# Patient Record
Sex: FEMALE | Race: WHITE | Employment: UNEMPLOYED | URBAN - METROPOLITAN AREA
[De-identification: names, ages, dates, MRNs, and addresses within clinical notes are randomized per-mention and may not be internally consistent; named-entity substitution may affect disease eponyms.]

---

## 2017-03-17 ENCOUNTER — OFFICE VISIT (OUTPATIENT)
Dept: INTERNAL MEDICINE CLINIC | Age: 57
End: 2017-03-17

## 2017-03-17 VITALS
SYSTOLIC BLOOD PRESSURE: 100 MMHG | BODY MASS INDEX: 27.96 KG/M2 | DIASTOLIC BLOOD PRESSURE: 66 MMHG | OXYGEN SATURATION: 97 % | TEMPERATURE: 98.3 F | WEIGHT: 157.8 LBS | RESPIRATION RATE: 17 BRPM | HEART RATE: 77 BPM | HEIGHT: 63 IN

## 2017-03-17 DIAGNOSIS — K21.9 GASTROESOPHAGEAL REFLUX DISEASE WITHOUT ESOPHAGITIS: ICD-10-CM

## 2017-03-17 DIAGNOSIS — L30.9 ECZEMA, UNSPECIFIED TYPE: ICD-10-CM

## 2017-03-17 DIAGNOSIS — E03.4 HYPOTHYROIDISM DUE TO ACQUIRED ATROPHY OF THYROID: ICD-10-CM

## 2017-03-17 DIAGNOSIS — E78.00 HYPERCHOLESTEROLEMIA: ICD-10-CM

## 2017-03-17 DIAGNOSIS — R73.02 IGT (IMPAIRED GLUCOSE TOLERANCE): ICD-10-CM

## 2017-03-17 DIAGNOSIS — E55.9 VITAMIN D DEFICIENCY: ICD-10-CM

## 2017-03-17 DIAGNOSIS — R21 RASH: Primary | ICD-10-CM

## 2017-03-17 RX ORDER — TRIAMCINOLONE ACETONIDE 1 MG/G
CREAM TOPICAL 2 TIMES DAILY
Qty: 30 G | Refills: 1 | Status: SHIPPED | OUTPATIENT
Start: 2017-03-17 | End: 2022-10-20

## 2017-03-17 RX ORDER — LEVOTHYROXINE SODIUM 50 UG/1
50 TABLET ORAL
Qty: 30 TAB | Refills: 5 | Status: SHIPPED | OUTPATIENT
Start: 2017-03-17 | End: 2017-09-21 | Stop reason: SDUPTHER

## 2017-03-17 RX ORDER — CHLORPHENIRAMINE MALEATE 4 MG
TABLET ORAL 2 TIMES DAILY
Qty: 45 G | Refills: 1 | Status: SHIPPED | OUTPATIENT
Start: 2017-03-17 | End: 2022-10-20

## 2017-03-17 NOTE — PROGRESS NOTES
Room 13    Patient presents with supervisor from residence,    Chief Complaint   Patient presents with    Rash     Noticed about 1 week on left arm, this area looks different than area located behind her right knee. Itching the first day only per patient. Rash on upper thigh into groin area has been then over 1 mo., supervisor at residence feels this may be chaffing. 1. Have you been to the ER, urgent care clinic since your last visit? Hospitalized since your last visit? No    2. Have you seen or consulted any other health care providers outside of the [address] [place] [address] since your last visit? Include any pap smears or colon screening. No     Health Maintenance Due   Topic Date Due    Pneumococcal 19-64 Medium Risk (1 of 1 - PPSV23) 03/03/1981    PAP AKA CERVICAL CYTOLOGY  03/03/1983    BREAST CANCER SCRN MAMMOGRAM  10/13/2012   GYN- She states her  usually takes her for pap, unsure when last appointment.

## 2017-03-17 NOTE — PROGRESS NOTES
HISTORY OF PRESENT ILLNESS  Woodrow Gomez is a 56 y.o. female. HPI  Presents for f/u rash    Rash on left arm, flexoral area  +itch    Right posterior leg rash - similar appearance    Past medical, Social, and Family history reviewed  Medications reviewed and updated. ROS  Complete ROS reviewed and negative or stable except as noted in HPI. Physical Exam   Constitutional: She is oriented to person, place, and time. She appears well-nourished. No distress. HENT:   Head: Normocephalic and atraumatic. Eyes: EOM are normal. Pupils are equal, round, and reactive to light. No scleral icterus. Neck: Normal range of motion. Neck supple. Cardiovascular: Normal rate, regular rhythm and normal heart sounds. Exam reveals no gallop and no friction rub. No murmur heard. Pulmonary/Chest: Effort normal and breath sounds normal. No respiratory distress. She has no wheezes. She has no rales. Abdominal: Soft. She exhibits no distension. There is no tenderness. Musculoskeletal: Normal range of motion. She exhibits no edema. Neurological: She is alert and oriented to person, place, and time. She exhibits normal muscle tone. Skin: Skin is warm. Rash: left flexoral arm with somewhat annular appearing minimally raised rash with mild scale. Right posterior leg rash - slightly raised, mild scale   Psychiatric:   Baseline awkward affect   Nursing note and vitals reviewed. Prior labs reviewed. ASSESSMENT and PLAN  Favor eczema  Possible fungal component    ICD-10-CM ICD-9-CM    1. Rash R21 782.1 triamcinolone acetonide (KENALOG) 0.1 % topical cream      clotrimazole (LOTRIMIN) 1 % topical cream   2. IGT (impaired glucose tolerance) R73.02 790.22    3. Vitamin D deficiency E55.9 268.9    4. Hypercholesterolemia E78.00 272.0    5. Hypothyroidism due to acquired atrophy of thyroid E03.4 244.8      246.8    6. Gastroesophageal reflux disease without esophagitis K21.9 530.81    7.  Eczema, unspecified type L30.9 692.9      Follow-up Disposition:  Return in about 3 months (around 6/13/2017) for pre-diabetes, cholesterol.   results and schedule of future studies reviewed with patient  reviewed diet, exercise and weight   cardiovascular risk and specific lipid/LDL goals reviewed  reviewed medications and side effects in detail   Topical steroid  Lotrimin in case of fungal component

## 2017-03-17 NOTE — MR AVS SNAPSHOT
Visit Information Date & Time Provider Department Dept. Phone Encounter #  
 3/17/2017  2:30 PM Francis Barajas MD [address] [facility]  [id number] Follow-up Instructions Return in about 3 months (around 6/13/2017) for pre-diabetes, cholesterol. Your Appointments 6/13/2017 11:30 AM  
ROUTINE CARE with Francis Barajas MD  
[facility] Appt Note: cholesterol, thyroid, pre-diabetes [address] [facility] [address]  
[address] [facility] [address] Upcoming Health Maintenance Date Due Pneumococcal 19-64 Medium Risk (1 of 1 - PPSV23) 3/3/1981 PAP AKA CERVICAL CYTOLOGY 3/3/1983 BREAST CANCER SCRN MAMMOGRAM 10/13/2012 COLONOSCOPY 6/27/2019 DTaP/Tdap/Td series (2 - Td) 11/18/2025 Allergies as of 3/17/2017  Review Complete On: 3/17/2017 By: Francis Barajas MD  
  
 Severity Noted Reaction Type Reactions Bee Sting [Sting, Bee]  04/24/2013    Unknown (comments) Bees [Hymenoptera Allergenic Extract]  10/07/2015    Other (comments) Ragweed  04/24/2013    Unknown (comments) Current Immunizations  Reviewed on 12/13/2016 Name Date  
 TB Skin Test (PPD) Intradermal 4/26/2016 11:00 AM  
 Tdap 11/18/2015 Not reviewed this visit You Were Diagnosed With   
  
 Codes Comments Rash    -  Primary ICD-10-CM: R21 
ICD-9-CM: 782.1 IGT (impaired glucose tolerance)     ICD-10-CM: R73.02 
ICD-9-CM: 790.22 Vitamin D deficiency     ICD-10-CM: E55.9 ICD-9-CM: 268.9 Hypercholesterolemia     ICD-10-CM: E78.00 ICD-9-CM: 272.0 Hypothyroidism due to acquired atrophy of thyroid     ICD-10-CM: E03.4 ICD-9-CM: 244.8, 246.8 Gastroesophageal reflux disease without esophagitis     ICD-10-CM: K21.9 ICD-9-CM: 530.81 Vitals BP Pulse Temp Resp Height(growth percentile) Weight(growth percentile) 100/66 (BP 1 Location: Right arm, BP Patient Position: Sitting) 77 98.3 °F (36.8 °C) (Oral) 17 5' 3\" (1.6 m) 157 lb 12.8 oz (71.6 kg) SpO2 BMI OB Status Smoking Status 97% 27.95 kg/m2 Menopause Current Every Day Smoker Vitals History BMI and BSA Data Body Mass Index Body Surface Area  
 27.95 kg/m 2 1.78 m 2 Preferred Pharmacy Pharmacy Name Phone [facility] [phone] [phone] Your Updated Medication List  
  
   
This list is accurate as of: 3/17/17  3:51 PM.  Always use your most recent med list.  
  
  
  
  
 benztropine 1 mg tablet Commonly known as:  COGENTIN Take  by mouth nightly. cholecalciferol 1,000 unit tablet Commonly known as:  VITAMIN D3 Take 2 Tabs by mouth daily. clotrimazole 1 % topical cream  
Commonly known as:  Lacey Abdi Apply  to affected area two (2) times a day. X 7-10 days DEPAKOTE  mg ER tablet Generic drug:  divalproex ER Take 500 mg by mouth two (2) times a day. fenofibrate 160 mg tablet Commonly known as:  LOFIBRA Take 1 Tab by mouth daily. haloperidol decanoate 100 mg/mL injection Commonly known as:  HALDOL DECANOATE  
200 mg by IntraMUSCular route every twenty-one (21) days. ibuprofen 400 mg tablet Commonly known as:  MOTRIN Take 1 Tab by mouth every eight (8) hours as needed for Pain.  
  
 lamoTRIgine 25 mg tablet Commonly known as: LaMICtal  
Take  by mouth daily. levothyroxine 50 mcg tablet Commonly known as:  SYNTHROID Take 1 Tab by mouth Daily (before breakfast). naproxen sodium 220 mg tablet Commonly known as:  NAPROSYN Take 220 mg by mouth two (2) times daily as needed. pantoprazole 40 mg tablet Commonly known as:  PROTONIX Take 1 Tab by mouth daily. PARoxetine 20 mg tablet Commonly known as:  PAXIL Take  by mouth daily. PERDIEM OVERNIGHT RELIEF 15 mg tablet Generic drug:  sennosides Take  by mouth.  
  
 permethrin 5 % topical cream  
Commonly known as:  ACTICIN Apply  to affected area now. apply sparingly as directed  
  
 pravastatin 80 mg tablet Commonly known as:  PRAVACHOL Take 1 Tab by mouth nightly. SEROquel  mg XR tablet Generic drug:  QUEtiapine SR Take 200 mg by mouth nightly. triamcinolone acetonide 0.1 % topical cream  
Commonly known as:  KENALOG Apply  to affected area two (2) times a day. use thin layer as directed x 3-5 days prn  
  
  
  
  
Prescriptions Sent to Pharmacy Refills  
 triamcinolone acetonide (KENALOG) 0.1 % topical cream 1 Sig: Apply  to affected area two (2) times a day. use thin layer as directed x 3-5 days prn Class: Normal  
 Pharmacy: [address] [place] [address] Ph #: [phone] Route: Topical  
 clotrimazole (LOTRIMIN) 1 % topical cream 1 Sig: Apply  to affected area two (2) times a day. X 7-10 days Class: Normal  
 Pharmacy: [address] [place] [address] Ph #: [phone] Route: Topical  
  
Follow-up Instructions Return in about 3 months (around 6/13/2017) for pre-diabetes, cholesterol. Introducing hospitals & HEALTH SERVICES! Rafal Blackburn introduces [employer] patient portal. Now you can access parts of your medical record, email your doctor's office, and request medication refills online. 1. In your internet browser, go to [email or web address] 2. Click on the First Time User? Click Here link in the Sign In box. You will see the New Member Sign Up page. 3. Enter your [employer]ik[employer] Access Code exactly as it appears below. You will not need to use this code after youve completed the sign-up process. If you do not sign up before the expiration date, you must request a new code. · Law[employer] Access Code: [email or web address] Expires: 6/15/2017  3:50 PM 
 
4. Enter the last four digits of your Social Security Number (xxxx) and Date of Birth (mm/dd/yyyy) as indicated and click Submit. You will be taken to the next sign-up page. 5. Create a Lawdingo ID. This will be your [employer]o login ID and cannot be changed, so think of one that is secure and easy to remember. 6. Create a Law[employer]o password. You can change your password at any time. 7. Enter your Password Reset Question and Answer. This can be used at a later time if you forget your password. 8. Enter your e-mail address. You will receive e-mail notification when new information is available in [address] 9. Click Sign Up. You can now view and download portions of your medical record. 10. Click the Download Summary menu link to download a portable copy of your medical information. If you have questions, please visit the Frequently Asked Questions section of the [employer] website. Remember, [employer] is NOT to be used for urgent needs. For medical emergencies, dial 911. Now available from your iPhone and Android! Please provide this summary of care documentation to your next provider. Your primary care clinician is listed as [address] If you have any questions after today's visit, please call [phone].

## 2017-04-12 ENCOUNTER — OFFICE VISIT (OUTPATIENT)
Dept: INTERNAL MEDICINE CLINIC | Age: 57
End: 2017-04-12

## 2017-04-12 VITALS
HEART RATE: 92 BPM | RESPIRATION RATE: 20 BRPM | OXYGEN SATURATION: 96 % | TEMPERATURE: 97.6 F | SYSTOLIC BLOOD PRESSURE: 124 MMHG | DIASTOLIC BLOOD PRESSURE: 81 MMHG | HEIGHT: 63 IN | BODY MASS INDEX: 27.85 KG/M2 | WEIGHT: 157.2 LBS

## 2017-04-12 DIAGNOSIS — S20.01XA CONTUSION OF RIGHT BREAST, INITIAL ENCOUNTER: ICD-10-CM

## 2017-04-12 DIAGNOSIS — S30.1XXA CONTUSION OF ABDOMINAL WALL, INITIAL ENCOUNTER: ICD-10-CM
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
## 2017-04-18 ENCOUNTER — OFFICE VISIT (OUTPATIENT)
Dept: INTERNAL MEDICINE CLINIC | Age: 57
End: 2017-04-18

## 2017-04-18 VITALS
SYSTOLIC BLOOD PRESSURE: 117 MMHG | HEIGHT: 63 IN | WEIGHT: 157.8 LBS | OXYGEN SATURATION: 94 % | HEART RATE: 94 BPM | TEMPERATURE: 97.6 F | BODY MASS INDEX: 27.96 KG/M2 | DIASTOLIC BLOOD PRESSURE: 76 MMHG | RESPIRATION RATE: 18 BRPM
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
## 2017-04-18 DIAGNOSIS — Z00.00 PHYSICAL EXAM, ANNUAL: Primary | ICD-10-CM

## 2017-04-18 DIAGNOSIS — F25.9 SCHIZOPHRENIA, SCHIZO-AFFECTIVE (HCC): ICD-10-CM

## 2017-04-18 DIAGNOSIS — S20.01XD CONTUSION OF RIGHT BREAST, SUBSEQUENT ENCOUNTER: ICD-10-CM

## 2017-04-18 DIAGNOSIS — Z11.1 SCREENING-PULMONARY TB: ICD-10-CM

## 2017-04-18 DIAGNOSIS — R82.90 ABNORMAL URINE FINDINGS: ICD-10-CM

## 2017-04-18 DIAGNOSIS — S30.1XXD CONTUSION OF ABDOMINAL WALL, SUBSEQUENT ENCOUNTER: ICD-10-CM

## 2017-04-18 LAB
BILIRUB UR QL STRIP: NEGATIVE
GLUCOSE UR-MCNC: NEGATIVE MG/DL
KETONES P FAST UR STRIP-MCNC: NEGATIVE MG/DL
PH UR STRIP: 7.5 [PH] (ref 4.6–8)
PROT UR QL STRIP: NEGATIVE MG/DL
SP GR UR STRIP: 1.01 (ref 1–1.03)
UA UROBILINOGEN AMB POC: NORMAL (ref 0.2–1)
URINALYSIS CLARITY POC: CLEAR
URINALYSIS COLOR POC: YELLOW
URINE BLOOD POC: NORMAL
URINE LEUKOCYTES POC: NORMAL
URINE NITRITES POC: NEGATIVE
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
## 2017-04-18 NOTE — MR AVS SNAPSHOT
Visit Information Date & Time Provider Department Dept. Phone Encounter #  
 4/18/2017 10:30 AM Reza Black NP [facility] Pediatrics and Internal Medicine [phone] [id number] Follow-up Instructions Return in about 1 year (around 4/18/2018) for physical.  
  
Your Appointments 6/13/2017 11:30 AM  
ROUTINE CARE with Mariecl Weaver MD  
[facility] Pediatrics and Internal Medicine [address] [facility]) Appt Note: cholesterol, thyroid, pre-diabetes [address] [facility] [address] [facility] [address]  
[place] [address] [facility] [address] Upcoming Health Maintenance Date Due Pneumococcal 19-64 Medium Risk (1 of 1 - PPSV23) 3/3/1981 PAP AKA CERVICAL CYTOLOGY 3/3/1983 BREAST CANCER SCRN MAMMOGRAM 10/13/2012 COLONOSCOPY 6/27/2019 DTaP/Tdap/Td series (2 - Td) 11/18/2025 Allergies as of 4/18/2017  Review Complete On: 4/18/2017 By: Reza Black NP Severity Noted Reaction Type Reactions Bee Sting [Sting, Bee]  04/24/2013    Unknown (comments) Bees [Hymenoptera Allergenic Extract]  10/07/2015    Other (comments) Ragweed  04/24/2013    Unknown (comments) Current Immunizations  Reviewed on 12/13/2016 Name Date  
 TB Skin Test (PPD) Intradermal  Incomplete, 4/26/2016 11:00 AM  
 Tdap 11/18/2015 Not reviewed this visit You Were Diagnosed With   
  
 Codes Comments Physical exam, annual    -  Primary ICD-10-CM: Z00.00 ICD-9-CM: V70.0 Screening-pulmonary TB     ICD-10-CM: Z11.1 ICD-9-CM: V74.1 Vitals BP Pulse Temp Resp Height(growth percentile) Weight(growth percentile) 117/76 (BP 1 Location: Left arm, BP Patient Position: Sitting) 94 97.6 °F (36.4 °C) (Oral) 18 5' 2.99\" (1.6 m) 157 lb 12.8 oz (71.6 kg) SpO2 BMI OB Status Smoking Status 94% 27.96 kg/m2 Menopause Current Every Day Smoker BMI and BSA Data Body Mass Index Body Surface Area 27.96 kg/m 2 1.78 m 2 Preferred Pharmacy Pharmacy Name Phone [facility], [place] [address] [phone] Your Updated Medication List  
  
   
This list is accurate as of: 4/18/17 11:12 AM.  Always use your most recent med list.  
  
  
  
  
 benztropine 1 mg tablet Commonly known as:  COGENTIN Take  by mouth nightly. cholecalciferol 1,000 unit tablet Commonly known as:  VITAMIN D3 Take 2 Tabs by mouth daily. clotrimazole 1 % topical cream  
Commonly known as:  Rhetta Plate Apply  to affected area two (2) times a day. X 7-10 days DEPAKOTE  mg ER tablet Generic drug:  divalproex ER Take 500 mg by mouth two (2) times a day. fenofibrate 160 mg tablet Commonly known as:  LOFIBRA Take 1 Tab by mouth daily. haloperidol decanoate 100 mg/mL injection Commonly known as:  HALDOL DECANOATE  
200 mg by IntraMUSCular route every twenty-one (21) days. ibuprofen 400 mg tablet Commonly known as:  MOTRIN Take 1 Tab by mouth every eight (8) hours as needed for Pain.  
  
 lamoTRIgine 25 mg tablet Commonly known as: LaMICtal  
Take  by mouth daily. levothyroxine 50 mcg tablet Commonly known as:  SYNTHROID Take 1 Tab by mouth Daily (before breakfast). naproxen sodium 220 mg tablet Commonly known as:  NAPROSYN Take 220 mg by mouth two (2) times daily as needed. pantoprazole 40 mg tablet Commonly known as:  PROTONIX Take 1 Tab by mouth daily. PARoxetine 20 mg tablet Commonly known as:  PAXIL Take  by mouth daily. PERDIEM OVERNIGHT RELIEF 15 mg tablet Generic drug:  sennosides Take  by mouth.  
  
 permethrin 5 % topical cream  
Commonly known as:  ACTICIN Apply  to affected area now. apply sparingly as directed  
  
 pravastatin 80 mg tablet Commonly known as:  PRAVACHOL Take 1 Tab by mouth nightly. SEROquel  mg XR tablet Generic drug:  QUEtiapine SR Take 200 mg by mouth nightly. triamcinolone acetonide 0.1 % topical cream  
Commonly known as:  KENALOG Apply  to affected area two (2) times a day. use thin layer as directed x 3-5 days prn We Performed the Following AMB POC TUBERCULOSIS, INTRADERMAL (SKIN TEST) [26547 CPT(R)] AMB POC URINALYSIS DIP STICK AUTO W/O MICRO [67365 CPT(R)] Follow-up Instructions Return in about 1 year (around 4/18/2018) for physical.  
  
  
Introducing [facility]! Riley Murphy introduces [employer] patient portal. Now you can access parts of your medical record, email your doctor's office, and request medication refills online. 1. In your internet browser, go to [email or web address]"[email or web address]. [employer]/"[employer] 2. Click on the First Time User? Click Here link in the Sign In box. You will see the New Member Sign Up page. 3. Enter your Keepstream Access Code exactly as it appears below. You will not need to use this code after youve completed the sign-up process. If you do not sign up before the expiration date, you must request a new code. · Keep[employer] Access Code: [email or web address] Expires: 6/15/2017  3:50 PM 
 
4. Enter the last four digits of your Social Security Number (xxxx) and Date of Birth (mm/dd/yyyy) as indicated and click Submit. You will be taken to the next sign-up page. 5. Create a Keepstream ID. This will be your Keepstream login ID and cannot be changed, so think of one that is secure and easy to remember. 6. Create a Keep[employer] password. You can change your password at any time. 7. Enter your Password Reset Question and Answer. This can be used at a later time if you forget your password. 8. Enter your e-mail address. You will receive e-mail notification when new information is available in [address] 9. Click Sign Up. You can now view and download portions of your medical record.  
10. Click the Download Summary menu link to download a portable copy of your medical information. If you have questions, please visit the Frequently Asked Questions section of the [email or web address] website. Remember, [email or web address] is NOT to be used for urgent needs. For medical emergencies, dial 911. Now available from your iPhone and Android! Please provide this summary of care documentation to your next provider. Your primary care clinician is listed as [address] If you have any questions after today's visit, please call [phone].

## 2017-04-20 LAB
BACTERIA UR CULT: NORMAL
MM INDURATION POC: 0 MM (ref 0–5)
PPD POC: NEGATIVE NEGATIVE

## 2017-04-25 PROBLEM — S20.01XA CONTUSION OF RIGHT BREAST: Status: ACTIVE | Noted: 2017-04-25

## 2017-04-25 PROBLEM — S30.1XXA CONTUSION OF ABDOMINAL WALL: Status: ACTIVE | Noted: 2017-04-25

## 2017-05-19 DIAGNOSIS — E78.00 HYPERCHOLESTEROLEMIA: ICD-10-CM

## 2017-05-22 RX ORDER — PRAVASTATIN SODIUM 80 MG/1
80 TABLET ORAL
Qty: 30 TAB | Refills: 5 | Status: SHIPPED | OUTPATIENT
Start: 2017-05-22 | End: 2017-11-22 | Stop reason: SDUPTHER

## 2017-05-26 DIAGNOSIS — E55.9 VITAMIN D DEFICIENCY: ICD-10-CM

## 2017-05-26 RX ORDER — MELATONIN
2000 DAILY
Qty: 60 TAB | Refills: 11 | Status: SHIPPED | OUTPATIENT
Start: 2017-05-26 | End: 2018-05-16 | Stop reason: SDUPTHER

## 2017-06-13 ENCOUNTER — OFFICE VISIT (OUTPATIENT)
Dept: INTERNAL MEDICINE CLINIC | Age: 57
End: 2017-06-13

## 2017-06-13 VITALS
BODY MASS INDEX: 27.54 KG/M2 | DIASTOLIC BLOOD PRESSURE: 73 MMHG | RESPIRATION RATE: 16 BRPM | SYSTOLIC BLOOD PRESSURE: 107 MMHG | WEIGHT: 155.4 LBS | OXYGEN SATURATION: 95 % | HEART RATE: 81 BPM | HEIGHT: 63 IN | TEMPERATURE: 97.4 F

## 2017-06-13 DIAGNOSIS — E03.4 HYPOTHYROIDISM DUE TO ACQUIRED ATROPHY OF THYROID: ICD-10-CM

## 2017-06-13 DIAGNOSIS — E78.00 HYPERCHOLESTEROLEMIA: ICD-10-CM

## 2017-06-13 DIAGNOSIS — R73.02 IGT (IMPAIRED GLUCOSE TOLERANCE): Primary | ICD-10-CM

## 2017-06-13 DIAGNOSIS — F25.9 SCHIZOPHRENIA, SCHIZO-AFFECTIVE (HCC): ICD-10-CM

## 2017-06-13 DIAGNOSIS — E55.9 VITAMIN D DEFICIENCY: ICD-10-CM

## 2017-06-13 DIAGNOSIS — K21.9 GASTROESOPHAGEAL REFLUX DISEASE WITHOUT ESOPHAGITIS: ICD-10-CM

## 2017-06-13 LAB — HBA1C MFR BLD HPLC: 5.6 % (ref 4.8–5.6)

## 2017-06-13 RX ORDER — FENOFIBRATE 160 MG/1
160 TABLET ORAL DAILY
Qty: 30 TAB | Refills: 5 | Status: SHIPPED | OUTPATIENT
Start: 2017-06-13 | End: 2018-01-18 | Stop reason: SDUPTHER

## 2017-06-13 NOTE — MR AVS SNAPSHOT
Visit Information Date & Time Provider Department Dept. Phone Encounter #  
 6/13/2017 11:30 AM Grecia Mariscal, [address] [place] [address] and Internal Medicine [phone] [id number] Follow-up Instructions Return in about 6 months (around 12/13/2017), or if symptoms worsen or fail to improve, for thyroid, cholesterol, pre-diabetes. Upcoming Health Maintenance Date Due INFLUENZA AGE 9 TO ADULT 8/1/2017 BREAST CANCER SCRN MAMMOGRAM 5/9/2019 COLONOSCOPY 6/27/2019 PAP AKA CERVICAL CYTOLOGY 4/28/2020 DTaP/Tdap/Td series (2 - Td) 11/18/2025 Allergies as of 6/13/2017  Review Complete On: 6/13/2017 By: Grecia Mariscal MD  
  
 Severity Noted Reaction Type Reactions Bee Sting [Sting, Bee]  04/24/2013    Unknown (comments) Bees [Hymenoptera Allergenic Extract]  10/07/2015    Other (comments) Ragweed  04/24/2013    Unknown (comments) Current Immunizations  Reviewed on 12/13/2016 Name Date  
 TB Skin Test (PPD) Intradermal 4/18/2017, 4/26/2016 11:00 AM  
 Tdap 11/18/2015 Not reviewed this visit You Were Diagnosed With   
  
 Codes Comments IGT (impaired glucose tolerance)    -  Primary ICD-10-CM: R73.02 
ICD-9-CM: 790.22 Vitamin D deficiency     ICD-10-CM: E55.9 ICD-9-CM: 268.9 Hypercholesterolemia     ICD-10-CM: E78.00 ICD-9-CM: 272.0 Hypothyroidism due to acquired atrophy of thyroid     ICD-10-CM: E03.4 ICD-9-CM: 244.8, 246.8 Gastroesophageal reflux disease without esophagitis     ICD-10-CM: K21.9 ICD-9-CM: 530.81 Schizophrenia[facility]     ICD-10-CM: F25.0 ICD-9-CM: 295.70 Vitals BP Pulse Temp Resp Height(growth percentile) Weight(growth percentile) 107/73 (BP 1 Location: Right arm, BP Patient Position: Sitting) 81 97.4 °F (36.3 °C) (Oral) 16 5' 2.99\" (1.6 m) 155 lb 6.4 oz (70.5 kg) SpO2 BMI OB Status Smoking Status 95% 27.54 kg/m2 Menopause Current Every Day Smoker BMI and BSA Data Body Mass Index Body Surface Area  
 27.54 kg/m 2 1.77 m 2 Preferred Pharmacy Pharmacy Name Phone Kathy Ernandez, [place] [phone] Your Updated Medication List  
  
   
This list is accurate as of: 6/13/17  1:05 PM.  Always use your most recent med list.  
  
  
  
  
 benztropine 1 mg tablet Commonly known as:  COGENTIN Take  by mouth nightly. cholecalciferol 1,000 unit tablet Commonly known as:  VITAMIN D3 Take 2 Tabs by mouth daily. clotrimazole 1 % topical cream  
Commonly known as:  [place] Sat Apply  to affected area two (2) times a day. X 7-10 days DEPAKOTE  mg ER tablet Generic drug:  divalproex ER Take 500 mg by mouth two (2) times a day. fenofibrate 160 mg tablet Commonly known as:  LOFIBRA Take 1 Tab by mouth daily. haloperidol decanoate 100 mg/mL injection Commonly known as:  HALDOL DECANOATE  
200 mg by IntraMUSCular route every twenty-one (21) days. ibuprofen 400 mg tablet Commonly known as:  MOTRIN Take 1 Tab by mouth every eight (8) hours as needed for Pain.  
  
 lamoTRIgine 25 mg tablet Commonly known as: LaMICtal  
Take  by mouth daily. levothyroxine 50 mcg tablet Commonly known as:  SYNTHROID Take 1 Tab by mouth Daily (before breakfast). naproxen sodium 220 mg tablet Commonly known as:  NAPROSYN Take 220 mg by mouth two (2) times daily as needed. pantoprazole 40 mg tablet Commonly known as:  PROTONIX Take 1 Tab by mouth daily. PARoxetine 20 mg tablet Commonly known as:  PAXIL Take  by mouth daily. PERDIEM OVERNIGHT RELIEF 15 mg tablet Generic drug:  sennosides Take  by mouth.  
  
 permethrin 5 % topical cream  
Commonly known as:  ACTICIN Apply  to affected area now. apply sparingly as directed  
  
 pravastatin 80 mg tablet Commonly known as:  PRAVACHOL  
 Take 1 Tab by mouth nightly. SEROquel  mg XR tablet Generic drug:  QUEtiapine SR Take 200 mg by mouth nightly. triamcinolone acetonide 0.1 % topical cream  
Commonly known as:  KENALOG Apply  to affected area two (2) times a day. use thin layer as directed x 3-5 days prn  
  
  
  
  
Prescriptions Sent to Pharmacy Refills  
 fenofibrate (LOFIBRA) 160 mg tablet 5 Sig: Take 1 Tab by mouth daily. Class: Normal  
 Pharmacy: [address] [place] [address] #: [phone] Route: Oral  
  
We Performed the Following AMB POC HEMOGLOBIN A1C [41199 CPT(R)] Follow-up Instructions Return in about 6 months (around 12/13/2017), or if symptoms worsen or fail to improve, for thyroid, cholesterol, pre-diabetes. To-Do List   
 Around 06/15/2017 Lab:  CBC WITH AUTOMATED DIFF Around 06/15/2017 Lab:  CK Around 06/15/2017 Lab:  LIPID PANEL Around 06/15/2017 Lab:  METABOLIC PANEL, COMPREHENSIVE Around 06/15/2017 Lab:  T4, FREE Around 06/15/2017 Lab:  TSH 3RD GENERATION Around 06/15/2017 Lab:  VITAMIN D, 25 HYDROXY Introducing [facility]! Peg Hemp introduces [employer] patient portal. Now you can access parts of your medical record, email your doctor's office, and request medication refills online. 1. In your internet browser, go to [email or web address] 2. Click on the First Time User? Click Here link in the Sign In box. You will see the New Member Sign Up page. 3. Enter your [employer] Access Code exactly as it appears below. You will not need to use this code after youve completed the sign-up process. If you do not sign up before the expiration date, you must request a new code. · [employer] Access Code: [email or web address] Expires: 6/15/2017  3:50 PM 
 
4.  Enter the last four digits of your Social Security Number (xxxx) and Date of Birth (mm/dd/yyyy) as indicated and click Submit. You will be taken to the next sign-up page. 5. Create a [facility]Who ID. This will be your ManyWho login ID and cannot be changed, so think of one that is secure and easy to remember. 6. Create a ManyWho password. You can change your password at any time. 7. Enter your Password Reset Question and Answer. This can be used at a later time if you forget your password. 8. Enter your e-mail address. You will receive e-mail notification when new information is available in [address] 9. Click Sign Up. You can now view and download portions of your medical record. 10. Click the Download Summary menu link to download a portable copy of your medical information. If you have questions, please visit the Frequently Asked Questions section of the ManyWho website. Remember, [facility] is NOT to be used for urgent needs. For medical emergencies, dial 911. Now available from your iPhone and Android! Please provide this summary of care documentation to your next provider. Your primary care clinician is listed as [address] If you have any questions after today's visit, please call [phone].

## 2017-06-13 NOTE — PROGRESS NOTES
HISTORY OF PRESENT ILLNESS  Daniel Bautista is a 56 y.o. female. HPI  Presents for f/u IGT, thyroid, lipids    Eating better  Residence home has changed diet options  Pt expresses interest in improved diet    No med changes from psych    No new complaints    Past medical, Social, and Family history reviewed  Medications reviewed and updated. ROS  Complete ROS reviewed and negative or stable except as noted in HPI. Physical Exam   Constitutional: She is oriented to person, place, and time. She appears well-nourished. No distress. HENT:   Head: Normocephalic and atraumatic. Eyes: EOM are normal. Pupils are equal, round, and reactive to light. No scleral icterus. Neck: Normal range of motion. Neck supple. No JVD present. Cardiovascular: Normal rate, regular rhythm and normal heart sounds. Exam reveals no gallop and no friction rub. No murmur heard. Pulmonary/Chest: Effort normal and breath sounds normal. No respiratory distress. She has no wheezes. She has no rales. Abdominal: Soft. She exhibits no distension. There is no tenderness. Musculoskeletal: Normal range of motion. She exhibits no edema. Lymphadenopathy:     She has no cervical adenopathy. Neurological: She is alert and oriented to person, place, and time. She exhibits normal muscle tone. Skin: Skin is warm. No rash noted. Psychiatric:   Baseline awkward affect   Nursing note and vitals reviewed. Prior labs reviewed. POC HgBA1C 5.6 today     ASSESSMENT and PLAN    ICD-10-CM ICD-9-CM    1. IGT (impaired glucose tolerance) R73.02 790.22 AMB POC HEMOGLOBIN A1C   2. Vitamin D deficiency E55.9 268.9 VITAMIN D, 25 HYDROXY   3. Hypercholesterolemia E78.00 272.0 CK      LIPID PANEL      METABOLIC PANEL, COMPREHENSIVE      fenofibrate (LOFIBRA) 160 mg tablet   4. Hypothyroidism due to acquired atrophy of thyroid E03.4 244.8 T4, FREE     246.8 TSH 3RD GENERATION   5.  Gastroesophageal reflux disease without esophagitis K21.9 530.81 CBC WITH AUTOMATED DIFF   6. Schizophrenia, schizo-affective ([facility] F25.0 295.70      Follow-up Disposition:  Return in about 6 months (around 12/13/2017), or if symptoms worsen or fail to improve, for thyroid, cholesterol, pre-diabetes.   results and schedule of future studies reviewed with patient,   reviewed diet, exercise and weight  cardiovascular risk and specific lipid/LDL goals reviewed  reviewed medications and side effects in detail   Recheck labs

## 2017-06-13 NOTE — PROGRESS NOTES
Rm 12    Pt presents with , Fanta Shea    Chief Complaint   Patient presents with    Diabetes     f/u    Thyroid Problem     f/u    Cholesterol Problem     f/u     1. Have you been to the ER, urgent care clinic since your last visit? Hospitalized since your last visit? No    2. Have you seen or consulted any other health care providers outside of the [address] [place] [address] since your last visit? Include any pap smears or colon screening.  No    Health Maintenance Due   Topic Date Due    Pneumococcal 19-64 Medium Risk (1 of 1 - PPSV23) 03/03/1979

## 2017-07-05 ENCOUNTER — LAB ONLY (OUTPATIENT)
Dept: INTERNAL MEDICINE CLINIC | Age: 57
End: 2017-07-05

## 2017-07-05 DIAGNOSIS — E55.9 VITAMIN D DEFICIENCY: ICD-10-CM

## 2017-07-05 DIAGNOSIS — K21.9 GASTROESOPHAGEAL REFLUX DISEASE WITHOUT ESOPHAGITIS: ICD-10-CM

## 2017-07-05 DIAGNOSIS — E03.4 HYPOTHYROIDISM DUE TO ACQUIRED ATROPHY OF THYROID: ICD-10-CM

## 2017-07-05 DIAGNOSIS — E78.00 HYPERCHOLESTEROLEMIA: ICD-10-CM

## 2017-07-06 LAB
25(OH)D3+25(OH)D2 SERPL-MCNC: 26.1 NG/ML (ref 30–100)
ALBUMIN SERPL-MCNC: 4.4 G/DL (ref 3.5–5.5)
ALBUMIN/GLOB SERPL: 1.8 {RATIO} (ref 1.2–2.2)
ALP SERPL-CCNC: 67 IU/L (ref 39–117)
ALT SERPL-CCNC: 20 IU/L (ref 0–32)
AST SERPL-CCNC: 29 IU/L (ref 0–40)
BASOPHILS # BLD AUTO: 0 X10E3/UL (ref 0–0.2)
BASOPHILS NFR BLD AUTO: 0 %
BILIRUB SERPL-MCNC: 0.3 MG/DL (ref 0–1.2)
BUN SERPL-MCNC: 14 MG/DL (ref 6–24)
BUN/CREAT SERPL: 19 (ref 9–23)
CALCIUM SERPL-MCNC: 9.6 MG/DL (ref 8.7–10.2)
CHLORIDE SERPL-SCNC: 100 MMOL/L (ref 96–106)
CHOLEST SERPL-MCNC: 189 MG/DL (ref 100–199)
CK SERPL-CCNC: 75 U/L (ref 24–173)
CO2 SERPL-SCNC: 23 MMOL/L (ref 18–29)
CREAT SERPL-MCNC: 0.72 MG/DL (ref 0.57–1)
EOSINOPHIL # BLD AUTO: 0 X10E3/UL (ref 0–0.4)
EOSINOPHIL NFR BLD AUTO: 1 %
ERYTHROCYTE [DISTWIDTH] IN BLOOD BY AUTOMATED COUNT: 14.5 % (ref 12.3–15.4)
GLOBULIN SER CALC-MCNC: 2.4 G/DL (ref 1.5–4.5)
GLUCOSE SERPL-MCNC: 88 MG/DL (ref 65–99)
HCT VFR BLD AUTO: 40.7 % (ref 34–46.6)
HDLC SERPL-MCNC: 52 MG/DL
HGB BLD-MCNC: 13.1 G/DL (ref 11.1–15.9)
IMM GRANULOCYTES # BLD: 0 X10E3/UL (ref 0–0.1)
IMM GRANULOCYTES NFR BLD: 0 %
LDLC SERPL CALC-MCNC: 97 MG/DL (ref 0–99)
LYMPHOCYTES # BLD AUTO: 2.4 X10E3/UL (ref 0.7–3.1)
LYMPHOCYTES NFR BLD AUTO: 45 %
MCH RBC QN AUTO: 29.2 PG (ref 26.6–33)
MCHC RBC AUTO-ENTMCNC: 32.2 G/DL (ref 31.5–35.7)
MCV RBC AUTO: 91 FL (ref 79–97)
MONOCYTES # BLD AUTO: 0.4 X10E3/UL (ref 0.1–0.9)
MONOCYTES NFR BLD AUTO: 7 %
NEUTROPHILS # BLD AUTO: 2.5 X10E3/UL (ref 1.4–7)
NEUTROPHILS NFR BLD AUTO: 47 %
PLATELET # BLD AUTO: 291 X10E3/UL (ref 150–379)
POTASSIUM SERPL-SCNC: 5.1 MMOL/L (ref 3.5–5.2)
PROT SERPL-MCNC: 6.8 G/DL (ref 6–8.5)
RBC # BLD AUTO: 4.49 X10E6/UL (ref 3.77–5.28)
SODIUM SERPL-SCNC: 139 MMOL/L (ref 134–144)
T4 FREE SERPL-MCNC: 1.11 NG/DL (ref 0.82–1.77)
TRIGL SERPL-MCNC: 200 MG/DL (ref 0–149)
TSH SERPL DL<=0.005 MIU/L-ACNC: 2.13 UIU/ML (ref 0.45–4.5)
VLDLC SERPL CALC-MCNC: 40 MG/DL (ref 5–40)
WBC # BLD AUTO: 5.3 X10E3/UL (ref 3.4–10.8)

## 2017-12-01 ENCOUNTER — OFFICE VISIT (OUTPATIENT)
Dept: INTERNAL MEDICINE CLINIC | Age: 57
End: 2017-12-01

## 2017-12-01 VITALS
DIASTOLIC BLOOD PRESSURE: 67 MMHG | OXYGEN SATURATION: 98 % | WEIGHT: 137.4 LBS | SYSTOLIC BLOOD PRESSURE: 95 MMHG | BODY MASS INDEX: 24.34 KG/M2 | RESPIRATION RATE: 16 BRPM | TEMPERATURE: 97.8 F | HEIGHT: 63 IN | HEART RATE: 79 BPM

## 2017-12-01 DIAGNOSIS — E03.4 HYPOTHYROIDISM DUE TO ACQUIRED ATROPHY OF THYROID: ICD-10-CM

## 2017-12-01 DIAGNOSIS — R31.9 HEMATURIA, UNSPECIFIED TYPE: ICD-10-CM

## 2017-12-01 DIAGNOSIS — R41.82 ALTERED MENTAL STATUS, UNSPECIFIED ALTERED MENTAL STATUS TYPE: Primary | ICD-10-CM

## 2017-12-01 DIAGNOSIS — F25.9 SCHIZOPHRENIA, SCHIZO-AFFECTIVE (HCC): ICD-10-CM

## 2017-12-01 DIAGNOSIS — E55.9 VITAMIN D DEFICIENCY: ICD-10-CM

## 2017-12-01 DIAGNOSIS — R63.4 WEIGHT LOSS: ICD-10-CM

## 2017-12-01 DIAGNOSIS — N39.0 URINARY TRACT INFECTION WITHOUT HEMATURIA, SITE UNSPECIFIED: ICD-10-CM

## 2017-12-01 DIAGNOSIS — E53.8 B12 DEFICIENCY: ICD-10-CM

## 2017-12-01 DIAGNOSIS — J30.9 ALLERGIC RHINITIS, UNSPECIFIED CHRONICITY, UNSPECIFIED SEASONALITY, UNSPECIFIED TRIGGER: ICD-10-CM

## 2017-12-01 DIAGNOSIS — R73.02 IGT (IMPAIRED GLUCOSE TOLERANCE): ICD-10-CM

## 2017-12-01 DIAGNOSIS — E78.00 HYPERCHOLESTEROLEMIA: ICD-10-CM

## 2017-12-01 LAB
BILIRUB UR QL STRIP: NEGATIVE
GLUCOSE UR-MCNC: NEGATIVE MG/DL
KETONES P FAST UR STRIP-MCNC: NEGATIVE MG/DL
PH UR STRIP: 6.5 [PH] (ref 4.6–8)
PROT UR QL STRIP: NEGATIVE
SP GR UR STRIP: 1.01 (ref 1–1.03)
UA UROBILINOGEN AMB POC: NORMAL (ref 0.2–1)
URINALYSIS CLARITY POC: CLEAR
URINALYSIS COLOR POC: YELLOW
URINE BLOOD POC: NEGATIVE
URINE LEUKOCYTES POC: NORMAL
URINE NITRITES POC: NEGATIVE

## 2017-12-01 NOTE — MR AVS SNAPSHOT
Visit Information Date & Time Provider Department Dept. Phone Encounter #  
 12/1/2017 12:00 PM Denys Beth, [address] [place] [address] and Internal Medicine [phone] [id number] Follow-up Instructions Return in about 2 weeks (around 12/15/2017), or if symptoms worsen or fail to improve, for weight loss, etc. Upcoming Health Maintenance Date Due  
 BREAST CANCER SCRN MAMMOGRAM 5/9/2019 COLONOSCOPY 6/27/2019 PAP AKA CERVICAL CYTOLOGY 4/28/2020 DTaP/Tdap/Td series (2 - Td) 11/18/2025 Allergies as of 12/1/2017  Review Complete On: 12/1/2017 By: Denys Beth MD  
  
 Severity Noted Reaction Type Reactions Bee Sting [Sting, Bee]  04/24/2013    Unknown (comments) Bees [Hymenoptera Allergenic Extract]  10/07/2015    Other (comments) Ragweed  04/24/2013    Unknown (comments) Current Immunizations  Reviewed on 12/13/2016 Name Date Influenza Vaccine 11/20/2017 TB Skin Test (PPD) Intradermal 4/18/2017, 4/26/2016 11:00 AM  
 Tdap 11/18/2015 Not reviewed this visit You Were Diagnosed With   
  
 Codes Comments Hematuria, unspecified type    -  Primary ICD-10-CM: R31.9 ICD-9-CM: 599.70 Hypothyroidism due to acquired atrophy of thyroid     ICD-10-CM: E03.4 ICD-9-CM: 244.8, 246.8 Allergic rhinitis, unspecified chronicity, unspecified seasonality, unspecified trigger     ICD-10-CM: J30.9 ICD-9-CM: 477.9 Schizophrenia, schizo-affective ([facility]     ICD-10-CM: F25.0 ICD-9-CM: 295.70 IGT (impaired glucose tolerance)     ICD-10-CM: R73.02 
ICD-9-CM: 790.22 Hypercholesterolemia     ICD-10-CM: E78.00 ICD-9-CM: 272.0 Vitamin D deficiency     ICD-10-CM: E55.9 ICD-9-CM: 268.9 Urinary tract infection without hematuria, site unspecified     ICD-10-CM: N39.0 ICD-9-CM: 599.0 Altered mental status, unspecified altered mental status type     ICD-10-CM: R41.82 
ICD-9-CM: 780.97   
 B12 deficiency     ICD-10-CM: E53.8 ICD-9-CM: 266.2 Weight loss     ICD-10-CM: R63.4 ICD-9-CM: 783.21 Vitals BP Pulse Temp Resp Height(growth percentile) Weight(growth percentile) 95/67 (BP 1 Location: Left arm, BP Patient Position: Sitting) 79 97.8 °F (36.6 °C) (Oral) 16 5' 2.99\" (1.6 m) 137 lb 6.4 oz (62.3 kg) SpO2 BMI OB Status Smoking Status 98% 24.35 kg/m2 Menopause Current Every Day Smoker BMI and BSA Data Body Mass Index Body Surface Area  
 24.35 kg/m 2 1.66 m 2 Preferred Pharmacy Pharmacy Name Phone Kathy Gray8, [place] [phone] Your Updated Medication List  
  
   
This list is accurate as of: 12/1/17  1:09 PM.  Always use your most recent med list.  
  
  
  
  
 benztropine 1 mg tablet Commonly known as:  COGENTIN Take  by mouth nightly. cholecalciferol 1,000 unit tablet Commonly known as:  VITAMIN D3 Take 2 Tabs by mouth daily. clotrimazole 1 % topical cream  
Commonly known as:  [place] Smith Apply  to affected area two (2) times a day. X 7-10 days DEPAKOTE  mg ER tablet Generic drug:  divalproex ER Take 500 mg by mouth two (2) times a day. fenofibrate 160 mg tablet Commonly known as:  LOFIBRA Take 1 Tab by mouth daily. haloperidol decanoate 100 mg/mL injection Commonly known as:  HALDOL DECANOATE  
200 mg by IntraMUSCular route every twenty-one (21) days. ibuprofen 400 mg tablet Commonly known as:  MOTRIN Take 1 Tab by mouth every eight (8) hours as needed for Pain.  
  
 levothyroxine 50 mcg tablet Commonly known as:  SYNTHROID Take 1 Tab by mouth Daily (before breakfast). naproxen sodium 220 mg tablet Commonly known as:  NAPROSYN Take 220 mg by mouth two (2) times daily as needed. pantoprazole 40 mg tablet Commonly known as:  PROTONIX Take 1 Tab by mouth daily. PARoxetine 20 mg tablet Commonly known as:  PAXIL Take  by mouth daily. PERDIEM OVERNIGHT RELIEF 15 mg tablet Generic drug:  sennosides Take  by mouth.  
  
 permethrin 5 % topical cream  
Commonly known as:  ACTICIN Apply  to affected area now. apply sparingly as directed  
  
 pravastatin 80 mg tablet Commonly known as:  PRAVACHOL Take 1 Tab by mouth nightly. SEROquel  mg XR tablet Generic drug:  QUEtiapine SR Take 200 mg by mouth nightly. triamcinolone acetonide 0.1 % topical cream  
Commonly known as:  KENALOG Apply  to affected area two (2) times a day. use thin layer as directed x 3-5 days prn We Performed the Following AMB POC URINALYSIS DIP STICK AUTO W/O MICRO [88012 CPT(R)] AMYLASE G8867270 CPT(R)] CBC WITH AUTOMATED DIFF [43396 CPT(R)] CK L3570722 CPT(R)] CULTURE, URINE S8838479 CPT(R)] HEMOGLOBIN A1C WITH EAG [56843 CPT(R)] LIPASE U920238 CPT(R)] METABOLIC PANEL, COMPREHENSIVE [38935 CPT(R)] SED RATE (ESR) S342502 CPT(R)] T4, FREE R5347445 CPT(R)] TSH 3RD GENERATION [66082 CPT(R)] URINALYSIS W/ RFLX MICROSCOPIC [57123 CPT(R)] VALPROIC ACID [66308 CPT(R)] VITAMIN B12 V1243188 CPT(R)] VITAMIN D, 25 HYDROXY I1243598 CPT(R)] Follow-up Instructions Return in about 2 weeks (around 12/15/2017), or if symptoms worsen or fail to improve, for weight loss, etc.  
  
  
Introducing [employer]! Cathy Knight introduces [employer] patient portal. Now you can access parts of your medical record, email your doctor's office, and request medication refills online. 1. In your internet browser, go to [email or web address] 2. Click on the First Time User? Click Here link in the Sign In box. You will see the New Member Sign Up page. 3. Enter your [employer] Access Code exactly as it appears below. You will not need to use this code after youve completed the sign-up process.  If you do not sign up before the expiration date, you must request a new code. 
 
· [employer] Access Code: [email or web address] Expires: 3/1/2018 12:15 PM 
 
4. Enter the last four digits of your Social Security Number (xxxx) and Date of Birth (mm/dd/yyyy) as indicated and click Submit. You will be taken to the next sign-up page. 5. Create a [employer] ID. This will be your [employer] login ID and cannot be changed, so think of one that is secure and easy to remember. 6. Create a [employer] password. You can change your password at any time. 7. Enter your Password Reset Question and Answer. This can be used at a later time if you forget your password. 8. Enter your e-mail address. You will receive e-mail notification when new information is available in [address] 9. Click Sign Up. You can now view and download portions of your medical record. 10. Click the Download Summary menu link to download a portable copy of your medical information. If you have questions, please visit the Frequently Asked Questions section of the [employer] website. Remember, [employer] is NOT to be used for urgent needs. For medical emergencies, dial 911. Now available from your iPhone and Android! Please provide this summary of care documentation to your next provider. Your primary care clinician is listed as [address] If you have any questions after today's visit, please call [phone].

## 2017-12-01 NOTE — PROGRESS NOTES
HISTORY OF PRESENT ILLNESS  Alexandro Vinson is a 56 y.o. female. HPI  Presents for f/u hospital f/u    Accompanied by  who provides much of the limited hx    Admitted with [facility]MS  CXR and abd CT reportedly done - ?findings  Found to have a UTI - ?etiology  Dx UTI - tx'd with abx    +weight loss  Apparently related to illness    But, still not eating all of her meals    Pt had been changed to oral haldol    Past medical, Social, and Family history reviewed  Medications reviewed and updated. ROS  Complete ROS reviewed and negative or stable except as noted in HPI. Physical Exam   Constitutional: She is oriented to person, place, and time. She appears well-nourished. No distress. HENT:   Head: Normocephalic and atraumatic. Eyes: EOM are normal. Pupils are equal, round, and reactive to light. No scleral icterus. Neck: Normal range of motion. Neck supple. No JVD present. Cardiovascular: Normal rate, regular rhythm and normal heart sounds. Exam reveals no gallop and no friction rub. No murmur heard. Pulmonary/Chest: Effort normal and breath sounds normal. No respiratory distress. She has no wheezes. She has no rales. Abdominal: Soft. She exhibits no distension. There is no tenderness. Musculoskeletal: Normal range of motion. She exhibits no edema. Lymphadenopathy:     She has no cervical adenopathy. Neurological: She is alert and oriented to person, place, and time. She exhibits normal muscle tone. Skin: Skin is warm. No rash noted. Psychiatric:   Baseline awkward affect   Nursing note and vitals reviewed. Prior labs reviewed. Reviewed prior imaging reports    ASSESSMENT and PLAN    ICD-10-CM ICD-9-CM    1. Altered mental status, unspecified altered mental status type R41.82 780.97 CBC WITH AUTOMATED DIFF      METABOLIC PANEL, COMPREHENSIVE      SED RATE (ESR)   2. Hematuria, unspecified type R31.9 599.70 AMB POC URINALYSIS DIP STICK AUTO W/O MICRO   3.  Hypothyroidism due to acquired atrophy of thyroid E03.4 244.8 T4, FREE     246.8 TSH 3RD GENERATION   4. Allergic rhinitis, unspecified chronicity, unspecified seasonality, unspecified trigger J30.9 477.9    5. Schizophrenia, schizo-affective ([facility] F25.0 295.70 VALPROIC ACID   6. IGT (impaired glucose tolerance) R73.02 790.22 CBC WITH AUTOMATED DIFF      HEMOGLOBIN A1C WITH EAG   7. Hypercholesterolemia E78.00 685.0 CK      METABOLIC PANEL, COMPREHENSIVE   8. Vitamin D deficiency E55.9 268.9 VITAMIN D, 25 HYDROXY   9. Urinary tract infection without hematuria, site unspecified N39.0 599.0 CULTURE, URINE      URINALYSIS W/ RFLX MICROSCOPIC   10. B12 deficiency E53.8 266.2 VITAMIN B12   11. Weight loss R63.4 783.21 AMYLASE      LIPASE     Follow-up Disposition:  Return in about 2 weeks (around 12/15/2017), or if symptoms worsen or fail to improve, for weight loss, etc.  results and schedule of future studies reviewed with patient  reviewed diet, exercise and weight   reviewed medications and side effects in detail   Get records and imaging reports from [address] labs including valproic acid levels  Continue current medications for now  Sees psych 12/13/17      Addendum:  Reviewed records from admit  depakote level 102 (upper limit of ref range 100)  Other labs to include tox screen, unremarkable.

## 2017-12-02 LAB
APPEARANCE UR: CLEAR
BACTERIA #/AREA URNS HPF: NORMAL /[HPF]
BILIRUB UR QL STRIP: NEGATIVE
CASTS URNS QL MICRO: NORMAL /LPF
COLOR UR: YELLOW
EPI CELLS #/AREA URNS HPF: NORMAL /HPF
GLUCOSE UR QL: NEGATIVE
HGB UR QL STRIP: NEGATIVE
KETONES UR QL STRIP: NEGATIVE
LEUKOCYTE ESTERASE UR QL STRIP: ABNORMAL
MICRO URNS: ABNORMAL
MUCOUS THREADS URNS QL MICRO: PRESENT
NITRITE UR QL STRIP: NEGATIVE
PH UR STRIP: 6.5 [PH] (ref 5–7.5)
PROT UR QL STRIP: NEGATIVE
RBC #/AREA URNS HPF: NORMAL /HPF
SP GR UR: 1.02 (ref 1–1.03)
UROBILINOGEN UR STRIP-MCNC: 2 MG/DL (ref 0.2–1)
WBC #/AREA URNS HPF: NORMAL /HPF

## 2017-12-03 LAB — BACTERIA UR CULT: NORMAL

## 2017-12-06 LAB
25(OH)D3+25(OH)D2 SERPL-MCNC: 35.5 NG/ML (ref 30–100)
ALBUMIN SERPL-MCNC: 3.8 G/DL (ref 3.5–5.5)
ALBUMIN/GLOB SERPL: 1.3 {RATIO} (ref 1.2–2.2)
ALP SERPL-CCNC: 183 IU/L (ref 39–117)
ALT SERPL-CCNC: 6 IU/L (ref 0–32)
AMYLASE SERPL-CCNC: 92 U/L (ref 31–124)
AST SERPL-CCNC: 19 IU/L (ref 0–40)
BASOPHILS # BLD AUTO: 0 X10E3/UL (ref 0–0.2)
BASOPHILS NFR BLD AUTO: 0 %
BILIRUB SERPL-MCNC: 0.4 MG/DL (ref 0–1.2)
BUN SERPL-MCNC: 9 MG/DL (ref 6–24)
BUN/CREAT SERPL: 13 (ref 9–23)
CALCIUM SERPL-MCNC: 9.2 MG/DL (ref 8.7–10.2)
CHLORIDE SERPL-SCNC: 95 MMOL/L (ref 96–106)
CK SERPL-CCNC: 62 U/L (ref 24–173)
CO2 SERPL-SCNC: 26 MMOL/L (ref 18–29)
CREAT SERPL-MCNC: 0.67 MG/DL (ref 0.57–1)
EOSINOPHIL # BLD AUTO: 0 X10E3/UL (ref 0–0.4)
EOSINOPHIL NFR BLD AUTO: 1 %
ERYTHROCYTE [DISTWIDTH] IN BLOOD BY AUTOMATED COUNT: 15.5 % (ref 12.3–15.4)
ERYTHROCYTE [SEDIMENTATION RATE] IN BLOOD BY WESTERGREN METHOD: 2 MM/HR (ref 0–40)
EST. AVERAGE GLUCOSE BLD GHB EST-MCNC: 111 MG/DL
GFR SERPLBLD CREATININE-BSD FMLA CKD-EPI: 114 ML/MIN/1.73
GFR SERPLBLD CREATININE-BSD FMLA CKD-EPI: 99 ML/MIN/1.73
GLOBULIN SER CALC-MCNC: 2.9 G/DL (ref 1.5–4.5)
GLUCOSE SERPL-MCNC: 76 MG/DL (ref 65–99)
HBA1C MFR BLD: 5.5 % (ref 4.8–5.6)
HCT VFR BLD AUTO: 34.1 % (ref 34–46.6)
HGB BLD-MCNC: 11.3 G/DL (ref 11.1–15.9)
IMM GRANULOCYTES # BLD: 0 X10E3/UL (ref 0–0.1)
IMM GRANULOCYTES NFR BLD: 0 %
LIPASE SERPL-CCNC: 8 U/L (ref 14–72)
LYMPHOCYTES # BLD AUTO: 2.4 X10E3/UL (ref 0.7–3.1)
LYMPHOCYTES NFR BLD AUTO: 51 %
MCH RBC QN AUTO: 30.7 PG (ref 26.6–33)
MCHC RBC AUTO-ENTMCNC: 33.1 G/DL (ref 31.5–35.7)
MCV RBC AUTO: 93 FL (ref 79–97)
MONOCYTES # BLD AUTO: 0.4 X10E3/UL (ref 0.1–0.9)
MONOCYTES NFR BLD AUTO: 7 %
NEUTROPHILS # BLD AUTO: 1.9 X10E3/UL (ref 1.4–7)
NEUTROPHILS NFR BLD AUTO: 41 %
PLATELET # BLD AUTO: 450 X10E3/UL (ref 150–379)
POTASSIUM SERPL-SCNC: 5.2 MMOL/L (ref 3.5–5.2)
PROT SERPL-MCNC: 6.7 G/DL (ref 6–8.5)
RBC # BLD AUTO: 3.68 X10E6/UL (ref 3.77–5.28)
SODIUM SERPL-SCNC: 133 MMOL/L (ref 134–144)
SPECIMEN STATUS REPORT, ROLRST: NORMAL
T4 FREE SERPL-MCNC: 1.43 NG/DL (ref 0.82–1.77)
TSH SERPL DL<=0.005 MIU/L-ACNC: 0.7 UIU/ML (ref 0.45–4.5)
VALPROATE SERPL-MCNC: 99 UG/ML (ref 50–100)
VIT B12 SERPL-MCNC: 1446 PG/ML (ref 211–946)
WBC # BLD AUTO: 4.7 X10E3/UL (ref 3.4–10.8)

## 2018-01-16 ENCOUNTER — OFFICE VISIT (OUTPATIENT)
Dept: INTERNAL MEDICINE CLINIC | Age: 58
End: 2018-01-16

## 2018-01-16 VITALS
WEIGHT: 134.8 LBS | BODY MASS INDEX: 23.88 KG/M2 | TEMPERATURE: 97.7 F | DIASTOLIC BLOOD PRESSURE: 67 MMHG | HEIGHT: 63 IN | SYSTOLIC BLOOD PRESSURE: 93 MMHG | OXYGEN SATURATION: 96 % | HEART RATE: 83 BPM

## 2018-01-16 DIAGNOSIS — F25.9 SCHIZOPHRENIA, SCHIZO-AFFECTIVE (HCC): ICD-10-CM

## 2018-01-16 DIAGNOSIS — R41.82 ALTERED MENTAL STATUS, UNSPECIFIED ALTERED MENTAL STATUS TYPE: Primary | ICD-10-CM

## 2018-01-16 DIAGNOSIS — R73.02 IGT (IMPAIRED GLUCOSE TOLERANCE): ICD-10-CM

## 2018-01-16 DIAGNOSIS — E03.4 HYPOTHYROIDISM DUE TO ACQUIRED ATROPHY OF THYROID: ICD-10-CM

## 2018-01-16 DIAGNOSIS — E55.9 VITAMIN D DEFICIENCY: ICD-10-CM

## 2018-01-16 DIAGNOSIS — F55.8 ABUSE OF OTHER NON-PSYCHOACTIVE SUBSTANCES: ICD-10-CM

## 2018-01-16 DIAGNOSIS — S99.921A FOOT INJURY, RIGHT, INITIAL ENCOUNTER: ICD-10-CM
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
## 2018-01-16 RX ORDER — HALOPERIDOL 10 MG/1
10 TABLET ORAL
COMMUNITY
Start: 2017-12-01

## 2018-01-16 NOTE — PROGRESS NOTES
RM 13  Pt presents today with her  and her guardian. Per guardian pt had a fall last night , hurt right foot      Chief Complaint   Patient presents with    Weight Loss     follow up       1. Have you been to the ER, urgent care clinic since your last visit? Hospitalized since your last visit? Yes [facility]    2. Have you seen or consulted any other health care providers outside of the [address] [place] [address] since your last visit? Include any pap smears or colon screening.  Yes Reason for visit: kapil huston on file

## 2018-01-16 NOTE — MR AVS SNAPSHOT
[address] [facility] [address] [place] [address] 
[phone] Patient: Phoebe River MRN: [id number] ZKR:8/8/6620 Visit Information Date & Time Provider Department Dept. Phone Encounter #  
 1/16/2018  2:45 PM Sandip Cason and Internal Medicine [phone] [id number] Follow-up Instructions Return in about 1 month (around 2/16/2018), or if symptoms worsen or fail to improve, for weight loss. Upcoming Health Maintenance Date Due  
 BREAST CANCER SCRN MAMMOGRAM 5/9/2019 COLONOSCOPY 6/27/2019 PAP AKA CERVICAL CYTOLOGY 4/28/2020 DTaP/Tdap/Td series (2 - Td) 11/18/2025 Allergies as of 1/16/2018  Review Complete On: 1/16/2018 By: Geremias Baeza MD  
  
 Severity Noted Reaction Type Reactions Bee Sting [Sting, Bee]  04/24/2013    Unknown (comments) Bees [Hymenoptera Allergenic Extract]  10/07/2015    Other (comments) Ragweed  04/24/2013    Unknown (comments) Current Immunizations  Reviewed on 12/13/2016 Name Date Influenza Vaccine 11/20/2017 TB Skin Test (PPD) Intradermal 4/18/2017, 4/26/2016 11:00 AM  
 Tdap 11/18/2015 Not reviewed this visit You Were Diagnosed With   
  
 Codes Comments Altered mental status, unspecified altered mental status type    -  Primary ICD-10-CM: R41.82 
ICD-9-CM: 780.97 Sch[facility]     ICD-10-CM: F25.0 ICD-9-CM: 295.70 Hypothyroidism due to acquired atrophy of thyroid     ICD-10-CM: E03.4 ICD-9-CM: 244.8, 246.8 IGT (impaired glucose tolerance)     ICD-10-CM: R73.02 
ICD-9-CM: 790.22 Vitamin D deficiency     ICD-10-CM: E55.9 ICD-9-CM: 268.9 Foot injury, right, initial encounter     ICD-10-CM: V51.685N ICD-9-CM: 959.7 Fall, initial encounter     ICD-10-CM: W19Mir Martinez ICD-9-CM: V7557432. 9 Abuse of other non-psychoactive substances     ICD-10-CM: F55.8 ICD-9-CM: 305.90 Vitals BP Pulse Temp Height(growth percentile) Weight(growth percentile) SpO2  
 93/67 (BP 1 Location: Left arm, BP Patient Position: Sitting) 83 97.7 °F (36.5 °C) (Oral) 5' 2.99\" (1.6 m) 134 lb 12.8 oz (61.1 kg) 96% BMI OB Status Smoking Status 23.89 kg/m2 Menopause Current Every Day Smoker BMI and BSA Data Body Mass Index Body Surface Area  
 23.89 kg/m 2 1.65 m 2 Preferred Pharmacy Pharmacy Name Phone [facility], [address] [phone] Your Updated Medication List  
  
   
This list is accurate as of: 1/16/18  4:05 PM.  Always use your most recent med list.  
  
  
  
  
 benztropine 1 mg tablet Commonly known as:  COGENTIN Take  by mouth nightly. cholecalciferol 1,000 unit tablet Commonly known as:  VITAMIN D3 Take 2 Tabs by mouth daily. clotrimazole 1 % topical cream  
Commonly known as:  Noemí Jacinto Apply  to affected area two (2) times a day. X 7-10 days DEPAKOTE  mg ER tablet Generic drug:  divalproex ER Take 500 mg by mouth two (2) times a day. fenofibrate 160 mg tablet Commonly known as:  LOFIBRA Take 1 Tab by mouth daily. haloperidol 10 mg tablet Commonly known as:  HALDOL  
  
 haloperidol decanoate 100 mg/mL injection Commonly known as:  HALDOL DECANOATE  
200 mg by IntraMUSCular route every twenty-one (21) days. ibuprofen 400 mg tablet Commonly known as:  MOTRIN Take 1 Tab by mouth every eight (8) hours as needed for Pain.  
  
 levothyroxine 50 mcg tablet Commonly known as:  SYNTHROID Take 1 Tab by mouth Daily (before breakfast). naproxen sodium 220 mg tablet Commonly known as:  NAPROSYN Take 220 mg by mouth two (2) times daily as needed. pantoprazole 40 mg tablet Commonly known as:  PROTONIX Take 1 Tab by mouth daily. PARoxetine 20 mg tablet Commonly known as:  PAXIL Take  by mouth daily. PERDIEM OVERNIGHT RELIEF 15 mg tablet Generic drug:  sennosides Take  by mouth.  
  
 permethrin 5 % topical cream  
Commonly known as:  ACTICIN Apply  to affected area now. apply sparingly as directed  
  
 pravastatin 80 mg tablet Commonly known as:  PRAVACHOL Take 1 Tab by mouth nightly. SEROquel  mg XR tablet Generic drug:  QUEtiapine SR Take 200 mg by mouth nightly. triamcinolone acetonide 0.1 % topical cream  
Commonly known as:  KENALOG Apply  to affected area two (2) times a day. use thin layer as directed x 3-5 days prn We Performed the Following 9-DRUG SCREEN + OXY, UR D3867737 CPT(R)] CBC WITH AUTOMATED DIFF [73003 CPT(R)] CULTURE, URINE [id number] CPT(R)] HALOPERIDOL [id number] CPT(R)] MAGNESIUM [id number] CPT(R)] METABOLIC PANEL, COMPREHENSIVE [07399 CPT(R)] SED RATE (ESR) [id number] CPT(R)] T4, FREE [id number] CPT(R)] TSH 3RD GENERATION [94426 CPT(R)] URINALYSIS W/ RFLX MICROSCOPIC [30368 CPT(R)] VALPROIC ACID [67655 CPT(R)] Follow-up Instructions Return in about 1 month (around 2/16/2018), or if symptoms worsen or fail to improve, for weight loss. To-Do List   
 01/17/2018 Imaging:  XR FOOT RT MIN 3 V Introducing [facility] & HEALTH SERVICES! [facility] introduces MobStac patient portal. Now you can access parts of your medical record, email your doctor's office, and request medication refills online. 1. In your internet browser, go to [email or web address] 2. Click on the First Time User? Click Here link in the Sign In box. You will see the New Member Sign Up page. 3. Enter your MobStac Access Code exactly as it appears below. You will not need to use this code after youve completed the sign-up process. If you do not sign up before the expiration date, you must request a new code. · MobStac Access Code: [email or web address] Expires: 3/1/2018 12:15 PM 
 
 4. Enter the last four digits of your Social Security Number (xxxx) and Date of Birth (mm/dd/yyyy) as indicated and click Submit. You will be taken to the next sign-up page. 5. Create a FamilySkyline ID. This will be your FamilySkyline login ID and cannot be changed, so think of one that is secure and easy to remember. 6. Create a FamilySkyline password. You can change your password at any time. 7. Enter your Password Reset Question and Answer. This can be used at a later time if you forget your password. 8. Enter your e-mail address. You will receive e-mail notification when new information is available in [address] 9. Click Sign Up. You can now view and download portions of your medical record. 10. Click the Download Summary menu link to download a portable copy of your medical information. If you have questions, please visit the Frequently Asked Questions section of the FamilySkyline website. Remember, FamilySkyline is NOT to be used for urgent needs. For medical emergencies, dial 911. Now available from your iPhone and Android! Please provide this summary of care documentation to your next provider. Your primary care clinician is listed as [address] If you have any questions after today's visit, please call [phone].

## 2018-01-16 NOTE — PROGRESS NOTES
HISTORY OF PRESENT ILLNESS  Brittnee Link is a 56 y.o. female. HPI  Presents for f/u weight loss, acute care    Still some confusion, disoriented in the interim    Rama Reich yest, injured right foot    Saw psych in the interim and no med changes made    Oral haldol added prior to change in mental status  Prior to adding PO haldol to regimen, pt had been functioning better, stable. Past medical, Social, and Family history reviewed  Medications reviewed and updated. ROS  Complete ROS reviewed and negative or stable except as noted in HPI. Physical Exam   Constitutional: She is oriented to person, place, and time. She appears well-nourished. No distress. HENT:   Head: Normocephalic and atraumatic. Eyes: EOM are normal. Pupils are equal, round, and reactive to light. No scleral icterus. Neck: Normal range of motion. Neck supple. No JVD present. Cardiovascular: Normal rate, regular rhythm and normal heart sounds. Exam reveals no gallop and no friction rub. No murmur heard. Pulmonary/Chest: Effort normal and breath sounds normal. No respiratory distress. She has no wheezes. She has no rales. Abdominal: Soft. She exhibits no distension. There is no tenderness. Musculoskeletal: Normal range of motion. She exhibits no edema. Lymphadenopathy:     She has no cervical adenopathy. Neurological: She is alert and oriented to person, place, and time. She exhibits normal muscle tone. Skin: Skin is warm. No rash noted. Psychiatric:   Baseline awkward affect, but more random/tangential speech than prior baseline. Nursing note and vitals reviewed. Prior labs reviewed. ASSESSMENT and PLAN  Favor iatrogenic - likely inadvertantly overdosed by addition of PO haldol    ICD-10-CM ICD-9-CM    1.  Altered mental status, unspecified altered mental status type R41.82 780.97 haloperidol (HALDOL) 10 mg tablet      HALOPERIDOL      CBC WITH AUTOMATED DIFF      METABOLIC PANEL, COMPREHENSIVE      MAGNESIUM SED RATE (ESR)      VALPROIC ACID      9-DRUG SCREEN + OXY, UR      CULTURE, URINE      URINALYSIS W/ RFLX MICROSCOPIC   2. Schizophrenia, schizo-affective ([facility] F25.0 295.70 haloperidol (HALDOL) 10 mg tablet      VALPROIC ACID   3. Hypothyroidism due to acquired atrophy of thyroid E03.4 244.8 T4, FREE     246.8 TSH 3RD GENERATION   4. IGT (impaired glucose tolerance) R73.02 790.22    5. Vitamin D deficiency E55.9 268.9    6. Foot injury, right, initial encounter S99.921A 959.7 XR FOOT RT MIN 3 V   7. Fall, initial encounter [address] XXXA E888.9    8. Abuse of other non-psychoactive substances  F55.8 305.90 9-DRUG SCREEN + OXY, UR     Follow-up Disposition:  Return in about 1 month (around 2/16/2018), or if symptoms worsen or fail to improve, for weight loss.    results and schedule of future studies reviewed with patient,  and guardian  reviewed diet, exercise and weight   cardiovascular risk and specific lipid/LDL goals reviewed  reviewed medications and side effects in detail   Recheck labs to include Haldol level  Foot films  If fail to identify source then consider MRI brain  Hold PO haldol for now  Address status with psych as scheduled next week having held po haldol until then

## 2018-01-17 LAB — BACTERIA UR CULT: NORMAL

## 2018-01-22 LAB
ALBUMIN SERPL-MCNC: 4.2 G/DL (ref 3.5–5.5)
ALBUMIN/GLOB SERPL: 1.6 {RATIO} (ref 1.2–2.2)
ALP SERPL-CCNC: 69 IU/L (ref 39–117)
ALT SERPL-CCNC: 10 IU/L (ref 0–32)
AST SERPL-CCNC: 24 IU/L (ref 0–40)
BASOPHILS # BLD AUTO: 0 X10E3/UL (ref 0–0.2)
BASOPHILS NFR BLD AUTO: 0 %
BILIRUB SERPL-MCNC: 0.4 MG/DL (ref 0–1.2)
BUN SERPL-MCNC: 11 MG/DL (ref 6–24)
BUN/CREAT SERPL: 17 (ref 9–23)
CALCIUM SERPL-MCNC: 9.5 MG/DL (ref 8.7–10.2)
CHLORIDE SERPL-SCNC: 98 MMOL/L (ref 96–106)
CO2 SERPL-SCNC: 25 MMOL/L (ref 18–29)
CREAT SERPL-MCNC: 0.63 MG/DL (ref 0.57–1)
EOSINOPHIL # BLD AUTO: 0 X10E3/UL (ref 0–0.4)
EOSINOPHIL NFR BLD AUTO: 0 %
ERYTHROCYTE [DISTWIDTH] IN BLOOD BY AUTOMATED COUNT: 14.4 % (ref 12.3–15.4)
ERYTHROCYTE [SEDIMENTATION RATE] IN BLOOD BY WESTERGREN METHOD: 2 MM/HR (ref 0–40)
GLOBULIN SER CALC-MCNC: 2.7 G/DL (ref 1.5–4.5)
GLUCOSE SERPL-MCNC: 85 MG/DL (ref 65–99)
HALOPERIDOL SERPL-MCNC: 35 NG/ML (ref 1–10)
HCT VFR BLD AUTO: 37.8 % (ref 34–46.6)
HGB BLD-MCNC: 12.5 G/DL (ref 11.1–15.9)
IMM GRANULOCYTES # BLD: 0 X10E3/UL (ref 0–0.1)
IMM GRANULOCYTES NFR BLD: 0 %
LYMPHOCYTES # BLD AUTO: 2.8 X10E3/UL (ref 0.7–3.1)
LYMPHOCYTES NFR BLD AUTO: 50 %
MAGNESIUM SERPL-MCNC: 1.9 MG/DL (ref 1.6–2.3)
MCH RBC QN AUTO: 31.5 PG (ref 26.6–33)
MCHC RBC AUTO-ENTMCNC: 33.1 G/DL (ref 31.5–35.7)
MCV RBC AUTO: 95 FL (ref 79–97)
MONOCYTES # BLD AUTO: 0.5 X10E3/UL (ref 0.1–0.9)
MONOCYTES NFR BLD AUTO: 9 %
NEUTROPHILS # BLD AUTO: 2.3 X10E3/UL (ref 1.4–7)
NEUTROPHILS NFR BLD AUTO: 41 %
PLATELET # BLD AUTO: 252 X10E3/UL (ref 150–379)
POTASSIUM SERPL-SCNC: 4.8 MMOL/L (ref 3.5–5.2)
PROT SERPL-MCNC: 6.9 G/DL (ref 6–8.5)
RBC # BLD AUTO: 3.97 X10E6/UL (ref 3.77–5.28)
SODIUM SERPL-SCNC: 138 MMOL/L (ref 134–144)
T4 FREE SERPL-MCNC: 1.1 NG/DL (ref 0.82–1.77)
TSH SERPL DL<=0.005 MIU/L-ACNC: 1.55 UIU/ML (ref 0.45–4.5)
VALPROATE SERPL-MCNC: 106 UG/ML (ref 50–100)
WBC # BLD AUTO: 5.7 X10E3/UL (ref 3.4–10.8)

## 2018-01-23 ENCOUNTER — HOSPITAL ENCOUNTER (OUTPATIENT)
Dept: GENERAL RADIOLOGY | Age: 58
Discharge: HOME | End: 2018-01-23
Attending: INTERNAL MEDICINE
Payer: MEDICARE

## 2018-01-23 DIAGNOSIS — S99.921A FOOT INJURY, RIGHT, INITIAL ENCOUNTER: ICD-10-CM

## 2018-01-23 PROCEDURE — 73630 X-RAY EXAM OF FOOT: CPT

## 2018-01-29 ENCOUNTER — TELEPHONE (OUTPATIENT)
Dept: INTERNAL MEDICINE CLINIC | Age: 58
End: 2018-01-29

## 2018-01-29 NOTE — TELEPHONE ENCOUNTER
Narciso Fine request to speak with a nurse to check the status for a statement of fall risk form that was dropped off on 1/23 for completion.  Please advise # [phone]

## 2018-01-29 NOTE — TELEPHONE ENCOUNTER
Patient called on number on file. Message left to return call to office. Please inform the patient or counselor that the form is available, at the .

## 2018-02-16 ENCOUNTER — OFFICE VISIT (OUTPATIENT)
Dept: INTERNAL MEDICINE CLINIC | Age: 58
End: 2018-02-16

## 2018-02-16 VITALS
RESPIRATION RATE: 16 BRPM | DIASTOLIC BLOOD PRESSURE: 79 MMHG | OXYGEN SATURATION: 96 % | HEIGHT: 63 IN | HEART RATE: 84 BPM | SYSTOLIC BLOOD PRESSURE: 105 MMHG | BODY MASS INDEX: 24.45 KG/M2 | TEMPERATURE: 98 F | WEIGHT: 138 LBS

## 2018-02-16 DIAGNOSIS — F25.9 SCHIZOPHRENIA, SCHIZO-AFFECTIVE (HCC): ICD-10-CM

## 2018-02-16 DIAGNOSIS — E03.4 HYPOTHYROIDISM DUE TO ACQUIRED ATROPHY OF THYROID: ICD-10-CM

## 2018-02-16 DIAGNOSIS — R46.89 BEHAVIORAL CHANGE: Primary | ICD-10-CM

## 2018-02-16 DIAGNOSIS — T81.9XXA COMPLICATION OF PROCEDURE, INITIAL ENCOUNTER: ICD-10-CM

## 2018-02-16 DIAGNOSIS — R41.89 COGNITIVE DECLINE: ICD-10-CM

## 2018-02-16 DIAGNOSIS — R26.89 IMBALANCE: ICD-10-CM

## 2018-02-16 RX ORDER — OLANZAPINE 5 MG/1
TABLET ORAL
COMMUNITY

## 2018-02-16 RX ORDER — LORAZEPAM 1 MG/1
1 TABLET ORAL
Qty: 3 TAB | Refills: 0 | Status: SHIPPED | OUTPATIENT
Start: 2018-02-16 | End: 2022-10-20

## 2018-02-16 NOTE — MR AVS SNAPSHOT
[address] [place] [id number] 
[phone] Patient: Brittnee Link MRN: [id number] DXC:0[birth year] Visit Information Date & Time Provider Department Dept. Phone Encounter #  
 2/16/2018  2:45 PM Mami Prado, [address] [place] [address] and Internal Medicine [phone] [id number] Follow-up Instructions Return in about 1 month (around 3/16/2018), or if symptoms worsen or fail to improve, for behavioral changes. Upcoming Health Maintenance Date Due  
 BREAST CANCER SCRN MAMMOGRAM 5/9/2019 COLONOSCOPY 6/27/2019 PAP AKA CERVICAL CYTOLOGY 4/28/2020 DTaP/Tdap/Td series (2 - Td) 11/18/2025 Allergies as of 2/16/2018  Review Complete On: 2/16/2018 By: Mami Prado MD  
  
 Severity Noted Reaction Type Reactions Bee Sting [Sting, Bee]  04/24/2013    Unknown (comments) Bees [Hymenoptera Allergenic Extract]  10/07/2015    Other (comments) Ragweed  04/24/2013    Unknown (comments) Current Immunizations  Reviewed on 12/13/2016 Name Date Influenza Vaccine 11/20/2017 TB Skin Test (PPD) Intradermal 4/18/2017, 4/26/2016 11:00 AM  
 Tdap 11/18/2015 Not reviewed this visit You Were Diagnosed With   
  
 Codes Comments Behavioral change    -  Primary ICD-10-CM: R46.89 
ICD-9-CM: 312.9 Imbalance     ICD-10-CM: R26.89 
ICD-9-CM: 781. 2 Cognitive decline     ICD-10-CM: R41.89 ICD-9-CM: 294.9 Schizophrenia, schizo-affective ([facility]     ICD-10-CM: F25.0 ICD-9-CM: 295.70 Hypothyroidism due to acquired atrophy of thyroid     ICD-10-CM: E03.4 ICD-9-CM: 244.8, 246.8 Complication of procedure, initial encounter     ICD-10-CM: T81. 9XXA ICD-9-CM: 256. 9 Vitals BP Pulse Temp Resp Height(growth percentile) Weight(growth percentile) 105/79 (BP 1 Location: Left arm, BP Patient Position: Sitting) 84 98 °F (36.7 °C) (Oral) 16 5' 2.99\" (1.6 m) 138 lb (62.6 kg) SpO2 BMI OB Status Smoking Status 96% 24.45 kg/m2 Menopause Current Every Day Smoker BMI and BSA Data Body Mass Index Body Surface Area  
 24.45 kg/m 2 1.67 m 2 Preferred Pharmacy Pharmacy Name Phone Michael Hernandez [phone] Your Updated Medication List  
  
   
This list is accurate as of: 2/16/18  4:07 PM.  Always use your most recent med list.  
  
  
  
  
 benztropine 1 mg tablet Commonly known as:  COGENTIN Take  by mouth nightly. cholecalciferol 1,000 unit tablet Commonly known as:  VITAMIN D3 Take 2 Tabs by mouth daily. clotrimazole 1 % topical cream  
Commonly known as:  Nilda Dials Apply  to affected area two (2) times a day. X 7-10 days DEPAKOTE  mg ER tablet Generic drug:  divalproex ER Take 500 mg by mouth two (2) times a day. fenofibrate 160 mg tablet Commonly known as:  LOFIBRA Take 1 Tab by mouth daily. haloperidol 10 mg tablet Commonly known as:  HALDOL  
  
 haloperidol decanoate 100 mg/mL injection Commonly known as:  HALDOL DECANOATE  
200 mg by IntraMUSCular route every twenty-one (21) days. ibuprofen 400 mg tablet Commonly known as:  MOTRIN Take 1 Tab by mouth every eight (8) hours as needed for Pain.  
  
 levothyroxine 50 mcg tablet Commonly known as:  SYNTHROID Take 1 Tab by mouth Daily (before breakfast). LORazepam 1 mg tablet Commonly known as:  ATIVAN Take 1 Tab by mouth daily as needed (procedural anxiolysis). naproxen sodium 220 mg tablet Commonly known as:  NAPROSYN Take 220 mg by mouth two (2) times daily as needed. OLANZapine 5 mg tablet Commonly known as:  ZyPREXA Take  by mouth nightly. pantoprazole 40 mg tablet Commonly known as:  PROTONIX Take 1 Tab by mouth daily. PARoxetine 20 mg tablet Commonly known as:  PAXIL Take  by mouth daily. Indications: should be 10 mg PERDIEM OVERNIGHT RELIEF 15 mg tablet Generic drug:  sennosides Take  by mouth.  
  
 permethrin 5 % topical cream  
Commonly known as:  ACTICIN Apply  to affected area now. apply sparingly as directed  
  
 pravastatin 80 mg tablet Commonly known as:  PRAVACHOL Take 1 Tab by mouth nightly. SEROquel  mg XR tablet Generic drug:  QUEtiapine SR Take 200 mg by mouth nightly. triamcinolone acetonide 0.1 % topical cream  
Commonly known as:  KENALOG Apply  to affected area two (2) times a day. use thin layer as directed x 3-5 days prn  
  
  
  
  
Prescriptions Printed Refills LORazepam (ATIVAN) 1 mg tablet 0 Sig: Take 1 Tab by mouth daily as needed (procedural anxiolysis). Class: Print Route: Oral  
  
We Performed the Following REFERRAL TO NEUROLOGY [CCR31 Custom] Follow-up Instructions Return in about 1 month (around 3/16/2018), or if symptoms worsen or fail to improve, for behavioral changes. To-Do List   
 02/23/2018 Imaging:  MRI BRAIN W WO CONT Referral Information Referral ID Referred By Referred To  
  
 [id number] [place], [facility], DO   
   [address] [facility] [address] [place] [address] Neurology [address] Phone: [phone] Fax: [phone] Visits Status Start Date End Date 1 New Request 2/16/18 2/16/19 If your referral has a status of pending review or denied, additional information will be sent to support the outcome of this decision. Introducing [facility] & HEALTH SERVICES! Dear Adry Bright: 
Thank you for requesting a "[employer] account. Our records indicate that you already have an active "[employer] account. You can access your account anytime at [email or web address] Did you know that you can access your hospital and ER discharge instructions at any time in [employer]? You can also review all of your test results from your hospital stay or ER visit. Additional Information If you have questions, please visit the Frequently Asked Questions section of the [employer] website at [email or web address] Remember, [employer] is NOT to be used for urgent needs. For medical emergencies, dial 911. Now available from your iPhone and Android! Please provide this summary of care documentation to your next provider. Your primary care clinician is listed as [address] If you have any questions after today's visit, please call [phone].

## 2018-02-16 NOTE — PROGRESS NOTES
HPI:  Presents for f/u weight, behavior    Accompanied by     Elevated haldol level  Saw psych - did not think the meds could be the source  Increased depakote  Decreased depo haldol  Dc'd po haldol  Add zyprexa   Requested MRI, further neuro eval      Poor balance  Poor alertness  Poorly responsive  Poor focus to complete task  Impulsive  Poor problem solving  Poor depth perception    Hair loss      Past medical, Social, and Family history reviewed    Prior to Admission medications    Medication Sig Start Date End Date Taking? Authorizing Provider   OLANZapine (ZYPREXA) 5 mg tablet Take  by mouth nightly. Yes Historical Provider   fenofibrate (LOFIBRA) 160 mg tablet Take 1 Tab by mouth daily. 1/18/18  Yes Heather Rosado MD   haloperidol (HALDOL) 10 mg tablet  12/1/17  Yes Historical Provider   pravastatin (PRAVACHOL) 80 mg tablet Take 1 Tab by mouth nightly. 11/22/17  Yes Heather Rosado MD   pantoprazole (PROTONIX) 40 mg tablet Take 1 Tab by mouth daily. 9/21/17  Yes Heather Rosado MD   levothyroxine (SYNTHROID) 50 mcg tablet Take 1 Tab by mouth Daily (before breakfast). 9/21/17  Yes Heather Rosado MD   cholecalciferol (VITAMIN D3) 1,000 unit tablet Take 2 Tabs by mouth daily. 5/26/17  Yes Heather Rosado MD   PARoxetine (PAXIL) 20 mg tablet Take  by mouth daily. Indications: should be 10 mg   Yes Historical Provider   ibuprofen (MOTRIN) 400 mg tablet Take 1 Tab by mouth every eight (8) hours as needed for Pain. 1/29/16  Yes Heather Rosado MD   benztropine (COGENTIN) 1 mg tablet Take  by mouth nightly. Yes Historical Provider   QUEtiapine SR (SEROQUEL XR) 200 mg XR tablet Take 200 mg by mouth nightly. Yes Historical Provider   haloperidol decanoate (HALDOL DECANOATE) 100 mg/mL injection 200 mg by IntraMUSCular route every twenty-one (21) days. Yes Historical Provider   sennosides (PERDIEM OVERNIGHT RELIEF) 15 mg Tab Take  by mouth.    Yes Historical Provider   divalproex ER (DEPAKOTE ER) 500 mg ER tablet Take 500 mg by mouth two (2) times a day. Yes Historical Provider   triamcinolone acetonide (KENALOG) 0.1 % topical cream Apply  to affected area two (2) times a day. use thin layer as directed x 3-5 days prn 3/17/17   Nazia Celaya MD   clotrimazole (LOTRIMIN) 1 % topical cream Apply  to affected area two (2) times a day. X 7-10 days 3/17/17   Nazia Celaya MD   naproxen sodium (NAPROSYN) 220 mg tablet Take 220 mg by mouth two (2) times daily as needed. Historical Provider   permethrin (ACTICIN) 5 % topical cream Apply  to affected area now. apply sparingly as directed    Historical Provider          ROS  Complete ROS reviewed and negative or stable except as noted in HPI. Physical Exam   Constitutional: She is oriented to person, place, and time. She appears well-nourished. No distress. HENT:   Head: Normocephalic and atraumatic. Eyes: EOM are normal. Pupils are equal, round, and reactive to light. No scleral icterus. Neck: Normal range of motion. Neck supple. No JVD present. Cardiovascular: Normal rate, regular rhythm and normal heart sounds. Exam reveals no gallop and no friction rub. No murmur heard. Pulmonary/Chest: Effort normal and breath sounds normal. No respiratory distress. She has no wheezes. She has no rales. Abdominal: Soft. She exhibits no distension. There is no tenderness. Musculoskeletal: Normal range of motion. She exhibits no edema. Lymphadenopathy:     She has no cervical adenopathy. Neurological: She is alert and oriented to person, place, and time. She exhibits normal muscle tone. Skin: Skin is warm. No rash noted. Psychiatric:   Baseline awkward affect, but more random/tangential speech than prior baseline. Nursing note and vitals reviewed. Prior labs reviewed. Assessment/Plan:  I still favor med effects as source for changes      ICD-10-CM ICD-9-CM    1.  Behavioral change R46.89 312.9 REFERRAL TO NEUROLOGY      MRI BRAIN W WO CONT   2. Imbalance R26.89 781.2 REFERRAL TO NEUROLOGY      MRI BRAIN W WO CONT   3. Cognitive decline R41.89 294.9 REFERRAL TO NEUROLOGY      MRI BRAIN W WO CONT   4. Schizophrenia, schizo-affective ([facility] F25.0 295.70    5. Hypothyroidism due to acquired atrophy of thyroid E03.4 244.8      246.8    6. Complication of procedure, initial encounter T81. 9XXA 998.9 LORazepam (ATIVAN) 1 mg tablet     Follow-up Disposition:  Return in about 1 month (around 3/16/2018), or if symptoms worsen or fail to improve, for behavioral changes. results and schedule of future studies reviewed with patient,   reviewed diet  and weight    reviewed medications and side effects in detail    Psych is Orlando Chambers NP  Consider call Dr. Lavinia Rapp at [facility] if we exhaust medical causes of change in condition to consider med changes.   MRI brain  Ativan prn procedure as anxiolytic

## 2018-02-16 NOTE — PROGRESS NOTES
Rm 13    Chief Complaint   Patient presents with    Follow-up     Weight loss    Behavioral Problem    Hair/Scalp Problem     hair loss     1. Have you been to the ER, urgent care clinic since your last visit? Hospitalized since your last visit? No    2. Have you seen or consulted any other health care providers outside of the [address] [place] [address] since your last visit? Include any pap smears or colon screening. No    There are no preventive care reminders to display for this patient.     PHQ over the last two weeks 2/16/2018   Little interest or pleasure in doing things Not at all   Feeling down, depressed or hopeless Not at all   Total Score PHQ 2 0

## 2018-03-02 ENCOUNTER — HOSPITAL ENCOUNTER (OUTPATIENT)
Dept: MRI IMAGING | Age: 58
Discharge: HOME | End: 2018-03-02
Attending: INTERNAL MEDICINE
Payer: MEDICARE

## 2018-03-02 DIAGNOSIS — R41.89 COGNITIVE DECLINE: ICD-10-CM

## 2018-03-02 DIAGNOSIS — R46.89 BEHAVIORAL CHANGE: ICD-10-CM

## 2018-03-02 DIAGNOSIS — R26.89 IMBALANCE: ICD-10-CM

## 2018-03-02 PROCEDURE — 70553 MRI BRAIN STEM W/O & W/DYE: CPT

## 2018-03-02 PROCEDURE — 74011250636 HC RX REV CODE- 250/636: Performed by: INTERNAL MEDICINE

## 2018-03-02 PROCEDURE — A9576 INJ PROHANCE MULTIPACK: HCPCS | Performed by: INTERNAL MEDICINE

## 2018-03-02 RX ADMIN — GADOTERIDOL 13 ML: 279.3 INJECTION, SOLUTION INTRAVENOUS at 13:25

## 2018-03-06 NOTE — PROGRESS NOTES
Atrophy (or wasting) of brain tissue which appears to be more advanced than expected at her age. But no other structural abnormality. Please reschedule the neurology appt to get the specialists opinion.

## 2018-03-12 ENCOUNTER — OFFICE VISIT (OUTPATIENT)
Dept: NEUROLOGY | Age: 58
End: 2018-03-12

## 2018-03-12 VITALS
DIASTOLIC BLOOD PRESSURE: 60 MMHG | WEIGHT: 131.2 LBS | OXYGEN SATURATION: 98 % | HEART RATE: 80 BPM | SYSTOLIC BLOOD PRESSURE: 100 MMHG | BODY MASS INDEX: 24.14 KG/M2 | RESPIRATION RATE: 18 BRPM | HEIGHT: 62 IN

## 2018-03-12 DIAGNOSIS — R41.89 COGNITIVE DECLINE: Primary | ICD-10-CM

## 2018-03-12 DIAGNOSIS — R41.3 MEMORY LOSS: ICD-10-CM

## 2018-03-12 DIAGNOSIS — G31.9 DIFFUSE BRAIN ATROPHY (HCC): ICD-10-CM

## 2018-03-12 RX ORDER — MEMANTINE HYDROCHLORIDE 5 MG/1
5 TABLET ORAL DAILY
Qty: 30 TAB | Refills: 3 | Status: SHIPPED | OUTPATIENT
Start: 2018-03-12 | End: 2018-07-16 | Stop reason: SDUPTHER

## 2018-03-12 NOTE — PROGRESS NOTES
Chief Complaint   Patient presents with    Memory Loss     Cognitive changes and abnormal MRI       Referred by: Dr. Aneta Mccloud      HPI    Shraddha Felipe is a 61-year-old woman who lives in a group home here with the . Ms. Klaudia Shepherd has a history of long-standing schizophrenia and is managed by psychiatry. She is here because in the last 6 months there has been noted decline in her cognitive capacity. In the last 6 months she has had the need for more hands on care where she lives. She needs prompting for hygiene. She needs occasional assistance with dressing herself. She needs physical assistance with bathing now. Apparently, 6 months ago her baseline was independently functioning. Now her speech has become more fragmented. She is preferring very high fat food. Medication adjustments have been made as a result of these mental status changes. At one point she was on 200 mg of Haldol injection every 21 days but that now has been reduced. She previously was on oral Haldol daily as well. She remains on daily quetiapine, Paxil, valproic acid 1 g every 12. She is a smoker. She is on lipid-lowering medication. MRI brain was obtained showing global atrophy but no acute issue. Unfortunately she is not a very good historian. Her  was able to provide a lot of information.       Review of Systems   Unable to perform ROS: Mental acuity       Past Medical History:   Diagnosis Date    Allergic rhinitis     Colon polyps     GERD (gastroesophageal reflux disease)     H/O colonoscopy     History of Papanicolaou smear of cervix 2014    Hypercholesterolemia     hypercholesterolemia    Hypothyroidism     IGT (impaired glucose tolerance)     Kidney tumor (benign)     removed in childhood     Other ill-defined conditions(022.46)     right shoulder bursitis    Psychiatric disorder     schizophrenia    Schizophrenia, schizo-affective ([facility] [address]     Thyroid disease      Family History   Problem Relation Age of Onset    Family history unknown: Yes     Social History     Social History    Marital status: SINGLE     Spouse name: N/A    Number of children: N/A    Years of education: N/A     Occupational History    Not on file. Social History Main Topics    Smoking status: Current Every Day Smoker     Packs/day: 0.50     Years: 15.00    Smokeless tobacco: Never Used    Alcohol use No    Drug use: No    Sexual activity: Not Currently     Partners: Male     Other Topics Concern    Not on file     Social History Narrative    ** Merged History Encounter **          Current Outpatient Prescriptions   Medication Sig    memantine (NAMENDA) 5 mg tablet Take 1 Tab by mouth daily.  OLANZapine (ZYPREXA) 5 mg tablet Take  by mouth nightly.  LORazepam (ATIVAN) 1 mg tablet Take 1 Tab by mouth daily as needed (procedural anxiolysis).  fenofibrate (LOFIBRA) 160 mg tablet Take 1 Tab by mouth daily.  pravastatin (PRAVACHOL) 80 mg tablet Take 1 Tab by mouth nightly.  pantoprazole (PROTONIX) 40 mg tablet Take 1 Tab by mouth daily.  levothyroxine (SYNTHROID) 50 mcg tablet Take 1 Tab by mouth Daily (before breakfast).  cholecalciferol (VITAMIN D3) 1,000 unit tablet Take 2 Tabs by mouth daily.  PARoxetine (PAXIL) 20 mg tablet Take  by mouth daily. Indications: should be 10 mg    ibuprofen (MOTRIN) 400 mg tablet Take 1 Tab by mouth every eight (8) hours as needed for Pain.  benztropine (COGENTIN) 1 mg tablet Take  by mouth nightly.  QUEtiapine SR (SEROQUEL XR) 200 mg XR tablet Take 200 mg by mouth nightly.  haloperidol decanoate (HALDOL DECANOATE) 100 mg/mL injection 200 mg by IntraMUSCular route every twenty-one (21) days.  sennosides (PERDIEM OVERNIGHT RELIEF) 15 mg Tab Take  by mouth.  divalproex ER (DEPAKOTE ER) 500 mg ER tablet Take 500 mg by mouth two (2) times a day.     haloperidol (HALDOL) 10 mg tablet     triamcinolone acetonide (KENALOG) 0.1 % topical cream Apply  to affected area two (2) times a day. use thin layer as directed x 3-5 days prn    clotrimazole (LOTRIMIN) 1 % topical cream Apply  to affected area two (2) times a day. X 7-10 days    naproxen sodium (NAPROSYN) 220 mg tablet Take 220 mg by mouth two (2) times daily as needed.  permethrin (ACTICIN) 5 % topical cream Apply  to affected area now. apply sparingly as directed     No current facility-administered medications for this visit. Allergies   Allergen Reactions    Bee Sting [Sting, Bee] Unknown (comments)    Bees [Hymenoptera Allergenic Extract] Other (comments)    Ragweed Unknown (comments)         Neurologic Exam     Mental Status   Level of consciousness: alert       She is awake and alert. She can follow commands. Speech is very interrupted and fragmented. Needs redirection at times. Her speech is difficult to understand. She fidgets with her hands       Cranial Nerves        Pupils are equal  Face is grossly symmetric  Tongue midline     Motor Exam   Muscle bulk: normal  Overall muscle tone: increased       Paratonia throughout  Motor exam intact     Sensory Exam   Light touch normal.     Gait, Coordination, and Reflexes     Gait  Gait: (Gait is slightly wide little bit slow. Does not appear shuffling but  reports that occasionally she does shuffle)    Tremor   Resting tremor: absent    Physical Exam   Constitutional: She appears well-developed and well-nourished. Cardiovascular: Normal rate. Pulmonary/Chest: Effort normal.   Skin: Skin is warm and dry. Psychiatric: Her behavior is normal.   Vitals reviewed.     Visit Vitals    Ht 5' 2\" (1.575 m)       Lab Results  Component Value Date/Time   WBC 5.7 01/16/2018 03:46 PM   HGB 12.5 01/16/2018 03:46 PM   HCT 37.8 01/16/2018 03:46 PM   PLATELET 813 22/32/9839 03:46 PM   MCV 95 01/16/2018 03:46 PM     Lab Results  Component Value Date/Time   Hemoglobin A1c 5.5 12/01/2017 01:11 PM Hemoglobin A1c 6.3 (H) 12/13/2016 02:38 PM   Hemoglobin A1c 6.0 (H) 05/25/2016 03:17 PM   Glucose 85 01/16/2018 03:46 PM   LDL, calculated 97 07/05/2017 09:09 AM   Creatinine 0.63 01/16/2018 03:46 PM      Lab Results  Component Value Date/Time   Cholesterol, total 189 07/05/2017 09:09 AM   HDL Cholesterol 52 07/05/2017 09:09 AM   LDL, calculated 97 07/05/2017 09:09 AM   Triglyceride 200 (H) 07/05/2017 09:09 AM     Lab Results  Component Value Date/Time   ALT (SGPT) 10 01/16/2018 03:46 PM   AST (SGOT) 24 01/16/2018 03:46 PM   Alk. phosphatase 69 01/16/2018 03:46 PM   Bilirubin, total 0.4 01/16/2018 03:46 PM   Albumin 4.2 01/16/2018 03:46 PM   Protein, total 6.9 01/16/2018 03:46 PM   PLATELET 180 09/36/4528 03:46 PM          CT Results (maximum last 3): No results found for this or any previous visit. MRI Results (maximum last 3): Results from Hospital Encounter encounter on 03/02/18   MRI BRAIN W WO CONT   Narrative Clinical indication: Behavioral change, confusion, a decline abnormal gait. Technical factors: Diffusion imaging, sagittal T1-weighted, axial T1-weighted  pre and post 13 cc IV ProHance T2-weighted FLAIR gradient echo coronal  T2-weighted coronal T1-weighted postcontrast sagittal T1-weighted postcontrast.  Comparison to thousand 8. Atrophy is prominent for age. There is no extra-axial fluid collection  hemorrhage or shift. Ventricles are midline. Major flow voids in the vessels at  the base of the brain are present. There is no significant white matter disease. Is no enhancing lesion or masses her. Impression impression: Prominent atrophy. Results from [place] encounter on 09/23/14   MRI PELV WO CONT   Narrative **Final Report**      ICD Codes / Adm. Diagnosis: 625.9   / Unspecified symptom associated    Examination:  MR PELVIS WO CON  - [id number] - Sep 23 2014 11:55AM  Accession No:  [id number]  Reason:  Pelvic pain in female      REPORT:  INDICATION: Pelvic pain, and inadequate ultrasound    COMPARISON: None    Technique MR imaging of the pelvic soft tissues was performed including the   following sequences: Coronal, sagittal, axial T2; IV contrast not   administered at request of patient. Axial STIR. FINDINGS:    Uterus is normal in size and signal intensity, slightly retroverted. This   measures 5.9 x 2.7 x 4.0 cm. Due to body habitus evaluation of the uterus,   myometrium and junctional zone is somewhat suboptimal though there is no   definite abnormality. Right every measures 1.2 x 1.1 cm. Left ovary measures   1.5 x 1.0 cm. There is no pelvic free fluid. The urinary bladder is   unremarkable. There is no pelvic lymphadenopathy. IMPRESSION:  No significant abnormality. Signing/Reading Doctor: Rachel Alvares ([id number])    Approved: Rachel Alvares ([id number])  Sep 23 2014  2:41PM                                     PET Results (maximum last 3): No results found for this or any previous visit. Assessment and Plan   Diagnoses and all orders for this visit:    1. Cognitive decline  -     VITAMIN B12 & FOLATE  -     METHYLMALONIC ACID  -     REFERRAL TO PHYSICAL THERAPY    2. Memory loss  -     VITAMIN B12 & FOLATE  -     METHYLMALONIC ACID  -     REFERRAL TO PHYSICAL THERAPY    3. Diffuse brain atrophy  -     VITAMIN B12 & FOLATE  -     METHYLMALONIC ACID  -     REFERRAL TO PHYSICAL THERAPY    Other orders  -     memantine (NAMENDA) 5 mg tablet; Take 1 Tab by mouth daily. 72-year-old woman who has had cognitive changes in the last 6 months according to the group home. I reviewed the electronic medical record noticing that there have been encounters for mental status changes. Medication changes have been done. I reviewed the MRI personally which does show global atrophy slightly more than I would expect for her age. No white matter changes seen to suggest ischemic issues. It is possible she may be developing Alzheimer's at this point.   There has been clear loss in independent function. Unfortunately there is no clear diagnostic test I can do to confirm this. The medications and psychiatric history do confound this diagnosis  slightly. I cannot prescribe donepezil because of the interactions with Haldol and Seroquel. Instead we will try low-dose memantine to see how she does. If psychiatry can further reduce Haldol injection that would be helpful. Valproic acid is fine at the dose she is on. She does have some mild parkinsonism on exam which is likely due to the long history of antipsychotic medication. I am going to send her to physical therapy for gait eval and management. I am going to check B12 as well since I do not see any recent blood work for that. I would like to see her in 3 months to reassess her. A notice of this visit/encounter being completed has been sent electronically to the patient's PCP and/or referring provider.      Darcy Wiseman, [address]  Diplomate ROMANN

## 2018-03-12 NOTE — Clinical Note
I would appreciate it if you could send this note to psychiatry as well. I do not have their information. Thank you.

## 2018-03-12 NOTE — COMMUNICATION BODY
Chief Complaint   Patient presents with    Memory Loss     Cognitive changes and abnormal MRI       Referred by: Dr. Abigail Barnett      KATHERINE Hamilton is a 68-year-old woman who lives in a group home here with the . Ms. Chen Welsh has a history of long-standing schizophrenia and is managed by psychiatry. She is here because in the last 6 months there has been noted decline in her cognitive capacity. In the last 6 months she has had the need for more hands on care where she lives. She needs prompting for hygiene. She needs occasional assistance with dressing herself. She needs physical assistance with bathing now. Apparently, 6 months ago her baseline was independently functioning. Now her speech has become more fragmented. She is preferring very high fat food. Medication adjustments have been made as a result of these mental status changes. At one point she was on 200 mg of Haldol injection every 21 days but that now has been reduced. She previously was on oral Haldol daily as well. She remains on daily quetiapine, Paxil, valproic acid 1 g every 12. She is a smoker. She is on lipid-lowering medication. MRI brain was obtained showing global atrophy but no acute issue. Unfortunately she is not a very good historian. Her  was able to provide a lot of information.       Review of Systems   Unable to perform ROS: Mental acuity       Past Medical History:   Diagnosis Date    Allergic rhinitis     Colon polyps     GERD (gastroesophageal reflux disease)     H/O colonoscopy     History of Papanicolaou smear of cervix 2014    Hypercholesterolemia     hypercholesterolemia    Hypothyroidism     IGT (impaired glucose tolerance)     Kidney tumor (benign)     removed in childhood     Other ill-defined conditions(433.18)     right shoulder bursitis    Psychiatric disorder     schizophrenia    Schizophrenia, schizo-affective ([facility] [address]     Thyroid disease      Family History   Problem Relation Age of Onset    Family history unknown: Yes     Social History     Social History    Marital status: SINGLE     Spouse name: N/A    Number of children: N/A    Years of education: N/A     Occupational History    Not on file. Social History Main Topics    Smoking status: Current Every Day Smoker     Packs/day: 0.50     Years: 15.00    Smokeless tobacco: Never Used    Alcohol use No    Drug use: No    Sexual activity: Not Currently     Partners: Male     Other Topics Concern    Not on file     Social History Narrative    ** Merged History Encounter **          Current Outpatient Prescriptions   Medication Sig    memantine (NAMENDA) 5 mg tablet Take 1 Tab by mouth daily.  OLANZapine (ZYPREXA) 5 mg tablet Take  by mouth nightly.  LORazepam (ATIVAN) 1 mg tablet Take 1 Tab by mouth daily as needed (procedural anxiolysis).  fenofibrate (LOFIBRA) 160 mg tablet Take 1 Tab by mouth daily.  pravastatin (PRAVACHOL) 80 mg tablet Take 1 Tab by mouth nightly.  pantoprazole (PROTONIX) 40 mg tablet Take 1 Tab by mouth daily.  levothyroxine (SYNTHROID) 50 mcg tablet Take 1 Tab by mouth Daily (before breakfast).  cholecalciferol (VITAMIN D3) 1,000 unit tablet Take 2 Tabs by mouth daily.  PARoxetine (PAXIL) 20 mg tablet Take  by mouth daily. Indications: should be 10 mg    ibuprofen (MOTRIN) 400 mg tablet Take 1 Tab by mouth every eight (8) hours as needed for Pain.  benztropine (COGENTIN) 1 mg tablet Take  by mouth nightly.  QUEtiapine SR (SEROQUEL XR) 200 mg XR tablet Take 200 mg by mouth nightly.  haloperidol decanoate (HALDOL DECANOATE) 100 mg/mL injection 200 mg by IntraMUSCular route every twenty-one (21) days.  sennosides (PERDIEM OVERNIGHT RELIEF) 15 mg Tab Take  by mouth.  divalproex ER (DEPAKOTE ER) 500 mg ER tablet Take 500 mg by mouth two (2) times a day.     haloperidol (HALDOL) 10 mg tablet     triamcinolone acetonide (KENALOG) 0.1 % topical cream Apply  to affected area two (2) times a day. use thin layer as directed x 3-5 days prn    clotrimazole (LOTRIMIN) 1 % topical cream Apply  to affected area two (2) times a day. X 7-10 days    naproxen sodium (NAPROSYN) 220 mg tablet Take 220 mg by mouth two (2) times daily as needed.  permethrin (ACTICIN) 5 % topical cream Apply  to affected area now. apply sparingly as directed     No current facility-administered medications for this visit. Allergies   Allergen Reactions    Bee Sting [Sting, Bee] Unknown (comments)    Bees [Hymenoptera Allergenic Extract] Other (comments)    Ragweed Unknown (comments)         Neurologic Exam     Mental Status   Level of consciousness: alert       She is awake and alert. She can follow commands. Speech is very interrupted and fragmented. Needs redirection at times. Her speech is difficult to understand. She fidgets with her hands       Cranial Nerves        Pupils are equal  Face is grossly symmetric  Tongue midline     Motor Exam   Muscle bulk: normal  Overall muscle tone: increased       Paratonia throughout  Motor exam intact     Sensory Exam   Light touch normal.     Gait, Coordination, and Reflexes     Gait  Gait: (Gait is slightly wide little bit slow. Does not appear shuffling but  reports that occasionally she does shuffle)    Tremor   Resting tremor: absent    Physical Exam   Constitutional: She appears well-developed and well-nourished. Cardiovascular: Normal rate. Pulmonary/Chest: Effort normal.   Skin: Skin is warm and dry. Psychiatric: Her behavior is normal.   Vitals reviewed.     Visit Vitals    Ht 5' 2\" (1.575 m)       Lab Results  Component Value Date/Time   WBC 5.7 01/16/2018 03:46 PM   HGB 12.5 01/16/2018 03:46 PM   HCT 37.8 01/16/2018 03:46 PM   PLATELET 890 32/15/5614 03:46 PM   MCV 95 01/16/2018 03:46 PM     Lab Results  Component Value Date/Time   Hemoglobin A1c 5.5 12/01/2017 01:11 PM Hemoglobin A1c 6.3 (H) 12/13/2016 02:38 PM   Hemoglobin A1c 6.0 (H) 05/25/2016 03:17 PM   Glucose 85 01/16/2018 03:46 PM   LDL, calculated 97 07/05/2017 09:09 AM   Creatinine 0.63 01/16/2018 03:46 PM      Lab Results  Component Value Date/Time   Cholesterol, total 189 07/05/2017 09:09 AM   HDL Cholesterol 52 07/05/2017 09:09 AM   LDL, calculated 97 07/05/2017 09:09 AM   Triglyceride 200 (H) 07/05/2017 09:09 AM     Lab Results  Component Value Date/Time   ALT (SGPT) 10 01/16/2018 03:46 PM   AST (SGOT) 24 01/16/2018 03:46 PM   Alk. phosphatase 69 01/16/2018 03:46 PM   Bilirubin, total 0.4 01/16/2018 03:46 PM   Albumin 4.2 01/16/2018 03:46 PM   Protein, total 6.9 01/16/2018 03:46 PM   PLATELET 543 93/67/1067 03:46 PM          CT Results (maximum last 3): No results found for this or any previous visit. MRI Results (maximum last 3): Results from Hospital Encounter encounter on 03/02/18   MRI BRAIN W WO CONT   Narrative Clinical indication: Behavioral change, confusion, a decline abnormal gait. Technical factors: Diffusion imaging, sagittal T1-weighted, axial T1-weighted  pre and post 13 cc IV ProHance T2-weighted FLAIR gradient echo coronal  T2-weighted coronal T1-weighted postcontrast sagittal T1-weighted postcontrast.  Comparison to thousand 8. Atrophy is prominent for age. There is no extra-axial fluid collection  hemorrhage or shift. Ventricles are midline. Major flow voids in the vessels at  the base of the brain are present. There is no significant white matter disease. Is no enhancing lesion or masses her. Impression impression: Prominent atrophy. Results from [place] encounter on 09/23/14   MRI PELV WO CONT   Narrative **Final Report**      ICD Codes / Adm. Diagnosis: 625.9   / Unspecified symptom associated    Examination:  MR PELVIS WO CON  - [id number] - Sep 23 2014 11:55AM  Accession No:  [id number]  Reason:  Pelvic pain in female      REPORT:  INDICATION: Pelvic pain, and inadequate ultrasound    COMPARISON: None    Technique MR imaging of the pelvic soft tissues was performed including the   following sequences: Coronal, sagittal, axial T2; IV contrast not   administered at request of patient. Axial STIR. FINDINGS:    Uterus is normal in size and signal intensity, slightly retroverted. This   measures 5.9 x 2.7 x 4.0 cm. Due to body habitus evaluation of the uterus,   myometrium and junctional zone is somewhat suboptimal though there is no   definite abnormality. Right every measures 1.2 x 1.1 cm. Left ovary measures   1.5 x 1.0 cm. There is no pelvic free fluid. The urinary bladder is   unremarkable. There is no pelvic lymphadenopathy. IMPRESSION:  No significant abnormality. Signing/Reading Doctor: Melissa Silverman ([id number])    Approved: Melissa Silverman ([id number])  Sep 23 2014  2:41PM                                     PET Results (maximum last 3): No results found for this or any previous visit. Assessment and Plan   Diagnoses and all orders for this visit:    1. Cognitive decline  -     VITAMIN B12 & FOLATE  -     METHYLMALONIC ACID  -     REFERRAL TO PHYSICAL THERAPY    2. Memory loss  -     VITAMIN B12 & FOLATE  -     METHYLMALONIC ACID  -     REFERRAL TO PHYSICAL THERAPY    3. Diffuse brain atrophy  -     VITAMIN B12 & FOLATE  -     METHYLMALONIC ACID  -     REFERRAL TO PHYSICAL THERAPY    Other orders  -     memantine (NAMENDA) 5 mg tablet; Take 1 Tab by mouth daily. 73-year-old woman who has had cognitive changes in the last 6 months according to the group home. I reviewed the electronic medical record noticing that there have been encounters for mental status changes. Medication changes have been done. I reviewed the MRI personally which does show global atrophy slightly more than I would expect for her age. No white matter changes seen to suggest ischemic issues. It is possible she may be developing Alzheimer's at this point.   There has been clear loss in independent function. Unfortunately there is no clear diagnostic test I can do to confirm this. The medications and psychiatric history do confound this diagnosis  slightly. I cannot prescribe donepezil because of the interactions with Haldol and Seroquel. Instead we will try low-dose memantine to see how she does. If psychiatry can further reduce Haldol injection that would be helpful. Valproic acid is fine at the dose she is on. She does have some mild parkinsonism on exam which is likely due to the long history of antipsychotic medication. I am going to send her to physical therapy for gait eval and management. I am going to check B12 as well since I do not see any recent blood work for that. I would like to see her in 3 months to reassess her. A notice of this visit/encounter being completed has been sent electronically to the patient's PCP and/or referring provider.      [address] [place] [address], [facility] [address]  Diplomate KONSTANTIN

## 2018-03-12 NOTE — PATIENT INSTRUCTIONS
Vitamin B12: About This Test  What is it? This blood test measures the amount of vitamin B12 in your blood. Your body needs this B vitamin to make blood cells and to keep your nervous system healthy. Why is this test done? This test is used to:  · Check for vitamin B12 deficiency anemia, especially in those who have had stomach or intestinal surgery or who have small intestine problems or a family history of this anemia. · Diagnose the cause of certain types of anemia. · Help find the cause of dementia or other nervous system symptoms, such as tingling or numbness of the arms or legs. How can you prepare for the test?  · In general, you don't need to prepare before having this test. Your doctor may give you some specific instructions. What happens during the test?  · A health professional takes a sample of your blood. What else should you know about the test?  · Your results will include an explanation of what a \"normal\" result is. This is called a \"reference range. \" It is just a guide. Your doctor will evaluate your results based on your health and other factors. This means that a value that falls outside the normal values listed may still be normal for you. · Vitamin B12 is usually measured at the same time as folic acid is tested, because a lack of either one can lead to a form of anemia called megaloblastic anemia. How long does the test take? · The test will take a few minutes. What happens after the test?  · You will probably be able to go home right away. · You can go back to your usual activities right away. Follow-up care is a key part of your treatment and safety. Be sure to make and go to all appointments, and call your doctor if you are having problems. It's also a good idea to keep a list of the medicines you take. Ask your doctor when you can expect to have your test results. Where can you learn more? Go to http://pricilla-rafa.info/.   Enter Q213 in the search box to learn more about \"Vitamin B12: About This Test.\"  Current as of: October 13, 2016  Content Version: 11.4  © 2007-4766 [employer]. Care instructions adapted under license by [employer] (which disclaims liability or warranty for this information). If you have questions about a medical condition or this instruction, always ask your healthcare professional. [place] [address] any warranty or liability for your use of this information.

## 2018-03-12 NOTE — MR AVS SNAPSHOT
[facility] [address] 
[phone] Patient: Katalina Miguel MRN: [id number] WIY:[birth year] Visit Information Date & Time Provider Department Dept. Phone Encounter #  
 3/12/2018  9:00  [facility] at [address] [id number] Follow-up Instructions Return in about 3 months (around 6/12/2018). Routing History Your Appointments 3/16/2018 11:45 AM  
ROUTINE CARE with William Ortega MD  
[facility] Pediatrics and Internal Medicine [facility]) Appt Note: F/u for behavioral changes [address] [facility] [address]  
[facility] 6026 [address] [facility] [address] Upcoming Health Maintenance Date Due  
 BREAST CANCER SCRN MAMMOGRAM 5/9/2019 COLONOSCOPY 6/27/2019 PAP AKA CERVICAL CYTOLOGY 4/28/2020 DTaP/Tdap/Td series (2 - Td) 11/18/2025 Allergies as of 3/12/2018  Review Complete On: 3/12/2018 By: Ester Barrett Severity Noted Reaction Type Reactions Bee Sting [Sting, Bee]  04/24/2013    Unknown (comments) Bees [Hymenoptera Allergenic Extract]  10/07/2015    Other (comments) Ragweed  04/24/2013    Unknown (comments) Current Immunizations  Reviewed on 12/13/2016 Name Date Influenza Vaccine 11/20/2017 TB Skin Test (PPD) Intradermal 4/18/2017, 4/26/2016 11:00 AM  
 Tdap 11/18/2015 Not reviewed this visit You Were Diagnosed With   
  
 Codes Comments Cognitive decline    -  Primary ICD-10-CM: R41.89 ICD-9-CM: 294.9 Memory loss     ICD-10-CM: R41.3 ICD-9-CM: 780.93 Diffuse brain atrophy     ICD-10-CM: G31.9 ICD-9-CM: 331.9 Vitals Height(growth percentile) OB Status Smoking Status 5' 2\" (1.575 m) Menopause Current Every Day Smoker Preferred Pharmacy Pharmacy Name Phone [facility] [address], [place] [address] [phone] Your Updated Medication List  
  
   
This list is accurate as of 3/12/18  9:27 AM.  Always use your most recent med list.  
  
  
  
  
 benztropine 1 mg tablet Commonly known as:  COGENTIN Take  by mouth nightly. cholecalciferol 1,000 unit tablet Commonly known as:  VITAMIN D3 Take 2 Tabs by mouth daily. clotrimazole 1 % topical cream  
Commonly known as:  Celestia Salisbury Apply  to affected area two (2) times a day. X 7-10 days DEPAKOTE  mg ER tablet Generic drug:  divalproex ER Take 500 mg by mouth two (2) times a day. fenofibrate 160 mg tablet Commonly known as:  LOFIBRA Take 1 Tab by mouth daily. haloperidol 10 mg tablet Commonly known as:  HALDOL  
  
 haloperidol decanoate 100 mg/mL injection Commonly known as:  HALDOL DECANOATE  
200 mg by IntraMUSCular route every twenty-one (21) days. ibuprofen 400 mg tablet Commonly known as:  MOTRIN Take 1 Tab by mouth every eight (8) hours as needed for Pain.  
  
 levothyroxine 50 mcg tablet Commonly known as:  SYNTHROID Take 1 Tab by mouth Daily (before breakfast). LORazepam 1 mg tablet Commonly known as:  ATIVAN Take 1 Tab by mouth daily as needed (procedural anxiolysis). memantine 5 mg tablet Commonly known as:  Cori Zoe Take 1 Tab by mouth daily. naproxen sodium 220 mg tablet Commonly known as:  NAPROSYN Take 220 mg by mouth two (2) times daily as needed. OLANZapine 5 mg tablet Commonly known as:  ZyPREXA Take  by mouth nightly. pantoprazole 40 mg tablet Commonly known as:  PROTONIX Take 1 Tab by mouth daily. PARoxetine 20 mg tablet Commonly known as:  PAXIL Take  by mouth daily. Indications: should be 10 mg PERDIEM OVERNIGHT RELIEF 15 mg tablet Generic drug:  sennosides Take  by mouth.  
  
 permethrin 5 % topical cream  
 Commonly known as:  ACTICIN Apply  to affected area now. apply sparingly as directed  
  
 pravastatin 80 mg tablet Commonly known as:  PRAVACHOL Take 1 Tab by mouth nightly. SEROquel  mg XR tablet Generic drug:  QUEtiapine SR Take 200 mg by mouth nightly. triamcinolone acetonide 0.1 % topical cream  
Commonly known as:  KENALOG Apply  to affected area two (2) times a day. use thin layer as directed x 3-5 days prn  
  
  
  
  
Prescriptions Sent to Pharmacy Refills  
 memantine (NAMENDA) 5 mg tablet 3 Sig: Take 1 Tab by mouth daily. Class: Normal  
 Pharmacy: [address] [place] [address] #: [phone] Route: Oral  
  
We Performed the Following METHYLMALONIC ACID [94643 CPT(R)] REFERRAL TO PHYSICAL THERAPY [DQH09 Custom] Comments:  
 Gait eval and therapy VITAMIN B12 & FOLATE [57817 CPT(R)] Follow-up Instructions Return in about 3 months (around 6/12/2018). Referral Information Referral ID Referred By Referred To  
  
 [id number] Lilliam Parsons Not Available Visits Status Start Date End Date 1 New Request 3/12/18 3/12/19 If your referral has a status of pending review or denied, additional information will be sent to support the outcome of this decision. Patient Instructions Vitamin B12: About This Test 
What is it? This blood test measures the amount of vitamin B12 in your blood. Your body needs this B vitamin to make blood cells and to keep your nervous system healthy. Why is this test done? This test is used to: · Check for vitamin B12 deficiency anemia, especially in those who have had stomach or intestinal surgery or who have small intestine problems or a family history of this anemia. · Diagnose the cause of certain types of anemia.  
· Help find the cause of dementia or other nervous system symptoms, such as tingling or numbness of the arms or legs. How can you prepare for the test? 
· In general, you don't need to prepare before having this test. Your doctor may give you some specific instructions. What happens during the test? 
· A health professional takes a sample of your blood. What else should you know about the test? 
· Your results will include an explanation of what a \"normal\" result is. This is called a \"reference range. \" It is just a guide. Your doctor will evaluate your results based on your health and other factors. This means that a value that falls outside the normal values listed may still be normal for you. · Vitamin B12 is usually measured at the same time as folic acid is tested, because a lack of either one can lead to a form of anemia called megaloblastic anemia. How long does the test take? · The test will take a few minutes. What happens after the test? 
· You will probably be able to go home right away. · You can go back to your usual activities right away. Follow-up care is a key part of your treatment and safety. Be sure to make and go to all appointments, and call your doctor if you are having problems. It's also a good idea to keep a list of the medicines you take. Ask your doctor when you can expect to have your test results. Where can you learn more? Go to http://pricilla-rafa.info/. Enter Q213 in the search box to learn more about \"Vitamin B12: About This Test.\" Current as of: October 13, 2016 Content Version: 11.4 © 8413-8282 Healthwise, Incorporated. Care instructions adapted under license by [employer] (which disclaims liability or warranty for this information). If you have questions about a medical condition or this instruction, always ask your healthcare professional. [place] [address] any warranty or liability for your use of this information. Introducing [facility] & HEALTH SERVICES! Dear Desmond Orlando: Thank you for requesting a disco volante account. Our records indicate that you already have an active disco volante account. You can access your account anytime at [email or web address]. [employer] Did you know that you can access your hospital and ER discharge instructions at any time in disco volante? You can also review all of your test results from your hospital stay or ER visit. Additional Information If you have questions, please visit the Frequently Asked Questions section of the disco volante website at [email or web address] Remember, disco volante is NOT to be used for urgent needs. For medical emergencies, dial 911. Now available from your iPhone and Android! Please provide this summary of care documentation to your next provider. Your primary care clinician is listed as [address] If you have any questions after today's visit, please call [phone].

## 2018-03-16 ENCOUNTER — OFFICE VISIT (OUTPATIENT)
Dept: INTERNAL MEDICINE CLINIC | Age: 58
End: 2018-03-16

## 2018-03-16 VITALS
HEART RATE: 68 BPM | BODY MASS INDEX: 24.37 KG/M2 | HEIGHT: 62 IN | DIASTOLIC BLOOD PRESSURE: 69 MMHG | WEIGHT: 132.4 LBS | TEMPERATURE: 97.9 F | RESPIRATION RATE: 18 BRPM | SYSTOLIC BLOOD PRESSURE: 91 MMHG

## 2018-03-16 DIAGNOSIS — R41.89 COGNITIVE DECLINE: Primary | ICD-10-CM

## 2018-03-16 DIAGNOSIS — E03.4 HYPOTHYROIDISM DUE TO ACQUIRED ATROPHY OF THYROID: ICD-10-CM

## 2018-03-16 DIAGNOSIS — F25.9 SCHIZOPHRENIA, SCHIZO-AFFECTIVE (HCC): ICD-10-CM

## 2018-03-16 DIAGNOSIS — R68.89 OTHER GENERAL SYMPTOMS AND SIGNS: ICD-10-CM

## 2018-03-16 DIAGNOSIS — E78.00 HYPERCHOLESTEROLEMIA: ICD-10-CM

## 2018-03-16 DIAGNOSIS — E55.9 VITAMIN D DEFICIENCY: ICD-10-CM

## 2018-03-16 DIAGNOSIS — R73.02 IGT (IMPAIRED GLUCOSE TOLERANCE): ICD-10-CM

## 2018-03-16 NOTE — PROGRESS NOTES
HPI:  Presents for f/u behavioral changes    Saw neurology - dx ?dementia  Added namenda. Care givers report worsening cognitive status  Cognitive function has gotten worse    Pt taken off haldol altogether yest by psychiatrist  seroquel increased with plan for further titration  Prn zyprexa    Past medical, Social, and Family history reviewed    Prior to Admission medications    Medication Sig Start Date End Date Taking? Authorizing Provider   memantine (NAMENDA) 5 mg tablet Take 1 Tab by mouth daily. 3/12/18  Yes Sundeep Devine DO   OLANZapine (ZYPREXA) 5 mg tablet Take  by mouth nightly. Yes Historical Provider   fenofibrate (LOFIBRA) 160 mg tablet Take 1 Tab by mouth daily. 1/18/18  Yes Cynthia Ricketts MD   pravastatin (PRAVACHOL) 80 mg tablet Take 1 Tab by mouth nightly. 11/22/17  Yes Cynthia Ricketts MD   pantoprazole (PROTONIX) 40 mg tablet Take 1 Tab by mouth daily. 9/21/17  Yes Cynthia Ricketts MD   levothyroxine (SYNTHROID) 50 mcg tablet Take 1 Tab by mouth Daily (before breakfast). 9/21/17  Yes Cynthia Ricketts MD   cholecalciferol (VITAMIN D3) 1,000 unit tablet Take 2 Tabs by mouth daily. 5/26/17  Yes Cynthia Ricketts MD   naproxen sodium (NAPROSYN) 220 mg tablet Take 220 mg by mouth two (2) times daily as needed. Yes Historical Provider   PARoxetine (PAXIL) 20 mg tablet Take  by mouth daily. Indications: should be 10 mg   Yes Historical Provider   ibuprofen (MOTRIN) 400 mg tablet Take 1 Tab by mouth every eight (8) hours as needed for Pain. 1/29/16  Yes Cynthia Ricketts MD   benztropine (COGENTIN) 1 mg tablet Take  by mouth nightly. Yes Historical Provider   QUEtiapine SR (SEROQUEL XR) 200 mg XR tablet Take 300 mg by mouth nightly. Yes Historical Provider   sennosides (PERDIEM OVERNIGHT RELIEF) 15 mg Tab Take  by mouth. Yes Historical Provider   divalproex ER (DEPAKOTE ER) 500 mg ER tablet Take 500 mg by mouth two (2) times a day.    Yes Historical Provider   LORazepam (ATIVAN) 1 mg tablet Take 1 Tab by mouth daily as needed (procedural anxiolysis). 2/16/18   Cynthia Ricketts MD   haloperidol (HALDOL) 10 mg tablet  12/1/17   Historical Provider   triamcinolone acetonide (KENALOG) 0.1 % topical cream Apply  to affected area two (2) times a day. use thin layer as directed x 3-5 days prn 3/17/17   Cynthia Ricketts MD   clotrimazole (LOTRIMIN) 1 % topical cream Apply  to affected area two (2) times a day. X 7-10 days 3/17/17   Cynthia Ricketts MD   haloperidol decanoate (HALDOL DECANOATE) 100 mg/mL injection 200 mg by IntraMUSCular route every twenty-one (21) days. Historical Provider   permethrin (ACTICIN) 5 % topical cream Apply  to affected area now. apply sparingly as directed    Historical Provider          ROS  Complete ROS reviewed and negative or stable except as noted in HPI. Physical Exam   Constitutional: She is oriented to person, place, and time. She appears well-nourished. No distress. HENT:   Head: Normocephalic and atraumatic. Eyes: EOM are normal. Pupils are equal, round, and reactive to light. No scleral icterus. Neck: Normal range of motion. Neck supple. No JVD present. Cardiovascular: Normal rate, regular rhythm and normal heart sounds. Exam reveals no gallop and no friction rub. No murmur heard. Pulmonary/Chest: Effort normal and breath sounds normal. No respiratory distress. She has no wheezes. She has no rales. Abdominal: Soft. She exhibits no distension. There is no tenderness. Musculoskeletal: Normal range of motion. She exhibits no edema. Lymphadenopathy:     She has no cervical adenopathy. Neurological: She is alert and oriented to person, place, and time. She exhibits normal muscle tone. Skin: Skin is warm. No rash noted. Psychiatric:   Baseline awkward affect, but more random/tangential speech than prior baseline. Nursing note and vitals reviewed. Prior labs reviewed. Assessment/Plan:    ICD-10-CM ICD-9-CM    1. Cognitive decline R41.89 294.9 VITAMIN B12      FOLATE      METHYLMALONIC ACID   2. Other general symptoms and signs  R68.89 780.99 VITAMIN B12      FOLATE   3. IGT (impaired glucose tolerance) R73.02 790.22    4. Vitamin D deficiency E55.9 268.9    5. Hypercholesterolemia E78.00 272.0    6. Hypothyroidism due to acquired atrophy of thyroid E03.4 244.8      246.8    7. Schizophrenia, schizo-affective ([facility] [address] F25.0 295.70      Follow-up Disposition:  Return in about 4 months (around 7/16/2018), or if symptoms worsen or fail to improve, for blood pressure, cholesterol, thyroid.   results and schedule of future studies reviewed with patient, caregivers  reviewed diet and weight     reviewed medications and side effects in detail   Hope to see improvement with med adjustments  O/w cont current meds  See psych as scheduled  See neuro as scheduled

## 2018-03-16 NOTE — MR AVS SNAPSHOT
[address] [facility] [place] [address] 
[phone] Patient: Poncho Galicia MRN: [id number] JYZ:0/4/[birth year]3 Visit Information Date & Time Provider Department Dept. Phone Encounter #  
 3/16/2018 11:45 AM Bran Lorenzana MD [facility] Pediatrics and Internal Medicine [phone] [id number] Follow-up Instructions Return in about 4 months (around 7/16/2018), or if symptoms worsen or fail to improve, for blood pressure, cholesterol, thyroid. Upcoming Health Maintenance Date Due  
 BREAST CANCER SCRN MAMMOGRAM 5/9/2019 COLONOSCOPY 6/27/2019 PAP AKA CERVICAL CYTOLOGY 4/28/2020 DTaP/Tdap/Td series (2 - Td) 11/18/2025 Allergies as of 3/16/2018  Review Complete On: 3/16/2018 By: Bran Lorenzana MD  
  
 Severity Noted Reaction Type Reactions Bee Sting [Sting, Bee]  04/24/2013    Unknown (comments) Bees [Hymenoptera Allergenic Extract]  10/07/2015    Other (comments) Ragweed  04/24/2013    Unknown (comments) Current Immunizations  Reviewed on 12/13/2016 Name Date Influenza Vaccine 11/20/2017 TB Skin Test (PPD) Intradermal 4/18/2017, 4/26/2016 11:00 AM  
 Tdap 11/18/2015 Not reviewed this visit You Were Diagnosed With   
  
 Codes Comments Cognitive decline    -  Primary ICD-10-CM: R41.89 ICD-9-CM: 294.9 Other general symptoms and signs     ICD-10-CM: R68.89 ICD-9-CM: 780.99 IGT (impaired glucose tolerance)     ICD-10-CM: R73.02 
ICD-9-CM: 790.22 Vitamin D deficiency     ICD-10-CM: E55.9 ICD-9-CM: 268.9 Hypercholesterolemia     ICD-10-CM: E78.00 ICD-9-CM: 272.0 Hypothyroidism due to acquired atrophy of thyroid     ICD-10-CM: E03.4 ICD-9-CM: 244.8, 246.8 Schizophrenia, schizo-affective ([facility] [address]     ICD-10-CM: F25.0 ICD-9-CM: 295.70 Vitals BP Pulse Temp Resp Height(growth percentile) Weight(growth percentile) 91/69 (BP 1 Location: Left arm, BP Patient Position: Sitting) 68 97.9 °F (36.6 °C) (Oral) 18 5' 2\" (1.575 m) 132 lb 6.4 oz (60.1 kg) BMI OB Status Smoking Status 24.22 kg/m2 Menopause Current Every Day Smoker Vitals History BMI and BSA Data Body Mass Index Body Surface Area  
 24.22 kg/m 2 1.62 m 2 Preferred Pharmacy Pharmacy Name Phone Kathy [facility], [place] [address] [phone] Your Updated Medication List  
  
   
This list is accurate as of 3/16/18 12:30 PM.  Always use your most recent med list.  
  
  
  
  
 benztropine 1 mg tablet Commonly known as:  COGENTIN Take  by mouth nightly. cholecalciferol 1,000 unit tablet Commonly known as:  VITAMIN D3 Take 2 Tabs by mouth daily. clotrimazole 1 % topical cream  
Commonly known as:  Ulyess Hands Apply  to affected area two (2) times a day. X 7-10 days DEPAKOTE  mg ER tablet Generic drug:  divalproex ER Take 500 mg by mouth two (2) times a day. fenofibrate 160 mg tablet Commonly known as:  LOFIBRA Take 1 Tab by mouth daily. haloperidol 10 mg tablet Commonly known as:  HALDOL  
  
 haloperidol decanoate 100 mg/mL injection Commonly known as:  HALDOL DECANOATE  
200 mg by IntraMUSCular route every twenty-one (21) days. ibuprofen 400 mg tablet Commonly known as:  MOTRIN Take 1 Tab by mouth every eight (8) hours as needed for Pain.  
  
 levothyroxine 50 mcg tablet Commonly known as:  SYNTHROID Take 1 Tab by mouth Daily (before breakfast). LORazepam 1 mg tablet Commonly known as:  ATIVAN Take 1 Tab by mouth daily as needed (procedural anxiolysis). memantine 5 mg tablet Commonly known as:  Lalita Ro Take 1 Tab by mouth daily. naproxen sodium 220 mg tablet Commonly known as:  NAPROSYN Take 220 mg by mouth two (2) times daily as needed. OLANZapine 5 mg tablet Commonly known as:  ZyPREXA Take  by mouth nightly. pantoprazole 40 mg tablet Commonly known as:  PROTONIX Take 1 Tab by mouth daily. PARoxetine 20 mg tablet Commonly known as:  PAXIL Take  by mouth daily. Indications: should be 10 mg PERDIEM OVERNIGHT RELIEF 15 mg tablet Generic drug:  sennosides Take  by mouth.  
  
 permethrin 5 % topical cream  
Commonly known as:  ACTICIN Apply  to affected area now. apply sparingly as directed  
  
 pravastatin 80 mg tablet Commonly known as:  PRAVACHOL Take 1 Tab by mouth nightly. SEROquel  mg XR tablet Generic drug:  QUEtiapine SR Take 300 mg by mouth nightly. triamcinolone acetonide 0.1 % topical cream  
Commonly known as:  KENALOG Apply  to affected area two (2) times a day. use thin layer as directed x 3-5 days prn We Performed the Following FOLATE [id number] CPT(R)] METHYLMALONIC ACID [32412 CPT(R)] VITAMIN B12 [id number] CPT(R)] Follow-up Instructions Return in about 4 months (around 7/16/2018), or if symptoms worsen or fail to improve, for blood pressure, cholesterol, thyroid. Introducing [facility] & HEALTH SERVICES! Dear Kala Ortiz: 
Thank you for requesting a LiveDeal account. Our records indicate that you already have an active LiveDeal account. You can access your account anytime at [email or web address]. [employer] Did you know that you can access your hospital and ER discharge instructions at any time in LiveDeal? You can also review all of your test results from your hospital stay or ER visit. Additional Information If you have questions, please visit the Frequently Asked Questions section of the LiveDeal website at [email or web address] [email or web address]/. Remember, LiveDeal is NOT to be used for urgent needs. For medical emergencies, dial 911. Now available from your iPhone and Android! Please provide this summary of care documentation to your next provider. Your primary care clinician is listed as [address] If you have any questions after today's visit, please call [phone].

## 2018-03-16 NOTE — PROGRESS NOTES
Exam Room 14  Jim Calderon is a 66 y.o. female  Chief Complaint   Patient presents with    Behavioral Problem     Behavior changes f/u     1. Have you been to the ER, urgent care clinic since your last visit? Hospitalized since your last visit? No    2. Have you seen or consulted any other health care providers outside of the [address] [place] [address] since your last visit? Include any pap smears or colon screening. No  There are no preventive care reminders to display for this patient.

## 2018-03-20 LAB
FOLATE SERPL-MCNC: 15.9 NG/ML
METHYLMALONATE SERPL-SCNC: 154 NMOL/L (ref 0–378)
VIT B12 SERPL-MCNC: 1430 PG/ML (ref 232–1245)

## 2018-03-21 DIAGNOSIS — E03.4 HYPOTHYROIDISM DUE TO ACQUIRED ATROPHY OF THYROID: ICD-10-CM

## 2018-03-21 RX ORDER — LEVOTHYROXINE SODIUM 50 UG/1
50 TABLET ORAL
Qty: 30 TAB | Refills: 5 | Status: SHIPPED | OUTPATIENT
Start: 2018-03-21 | End: 2018-09-25 | Stop reason: SDUPTHER

## 2018-03-21 NOTE — TELEPHONE ENCOUNTER
Medication refill request:    Last Office Visit:   Friday, March 16, 2018  Next Office Visit:  Future Appointments  Date Time Provider Parmjit Contreras   7/16/2018 9:45 AM Antonella Dhaliwal MD [facility] [address] [facility] verified.  Yes

## 2018-04-05 ENCOUNTER — OFFICE VISIT (OUTPATIENT)
Dept: INTERNAL MEDICINE CLINIC | Age: 58
End: 2018-04-05

## 2018-04-05 VITALS
TEMPERATURE: 97.5 F | RESPIRATION RATE: 18 BRPM | BODY MASS INDEX: 24.36 KG/M2 | HEART RATE: 91 BPM | SYSTOLIC BLOOD PRESSURE: 108 MMHG | HEIGHT: 62 IN | OXYGEN SATURATION: 98 % | DIASTOLIC BLOOD PRESSURE: 80 MMHG | WEIGHT: 132.38 LBS

## 2018-04-05 DIAGNOSIS — R32 URINARY INCONTINENCE, UNSPECIFIED TYPE: Primary | ICD-10-CM

## 2018-04-05 DIAGNOSIS — R82.90 ABNORMAL URINE FINDINGS: ICD-10-CM

## 2018-04-05 DIAGNOSIS — R82.90 BAD ODOR OF URINE: ICD-10-CM

## 2018-04-05 LAB
BILIRUB UR QL STRIP: NEGATIVE
GLUCOSE UR-MCNC: NEGATIVE MG/DL
KETONES P FAST UR STRIP-MCNC: NEGATIVE MG/DL
PH UR STRIP: 6.5 [PH] (ref 4.6–8)
PROT UR QL STRIP: NEGATIVE
SP GR UR STRIP: 1 (ref 1–1.03)
UA UROBILINOGEN AMB POC: NORMAL (ref 0.2–1)
URINALYSIS CLARITY POC: CLEAR
URINALYSIS COLOR POC: YELLOW
URINE BLOOD POC: NORMAL
URINE LEUKOCYTES POC: NORMAL
URINE NITRITES POC: NEGATIVE

## 2018-04-05 RX ORDER — LANOLIN ALCOHOL/MO/W.PET/CERES
CREAM (GRAM) TOPICAL
COMMUNITY

## 2018-04-05 RX ORDER — SULFAMETHOXAZOLE AND TRIMETHOPRIM 800; 160 MG/1; MG/1
1 TABLET ORAL 2 TIMES DAILY
Qty: 14 TAB | Refills: 0 | Status: SHIPPED | OUTPATIENT
Start: 2018-04-05 | End: 2018-04-12

## 2018-04-05 NOTE — PATIENT INSTRUCTIONS

## 2018-04-05 NOTE — MR AVS SNAPSHOT
[address]  [address] [place] [address] 
[phone] Patient: Jorge Perez MRN: [id number] APS:8/8/3637 Visit Information Date & Time Provider Department Dept. Phone Encounter #  
 4/5/2018 12:00 PM Niurkacecilia Mitchell, [address] [place] [address], Ne and Internal Medicine [phone] [id number] Follow-up Instructions Return if symptoms worsen or fail to improve. Your Appointments 7/16/2018  9:45 AM  
ROUTINE CARE with Anselmo Gottron, MD  
[facility] [address] [place] [address]) Appt Note: r/s  
 [address] [facility] [address] [facility] [id number] [place] [address] [facility] [address] Upcoming Health Maintenance Date Due  
 MEDICARE YEARLY EXAM 4/19/2018 BREAST CANCER SCRN MAMMOGRAM 5/9/2019 COLONOSCOPY 6/27/2019 PAP AKA CERVICAL CYTOLOGY 4/28/2020 DTaP/Tdap/Td series (2 - Td) 11/18/2025 Allergies as of 4/5/2018  Review Complete On: 4/5/2018 By: Camille Abarca LPN Severity Noted Reaction Type Reactions Bee Sting [Sting, Bee]  04/24/2013    Unknown (comments) Bees [Hymenoptera Allergenic Extract]  10/07/2015    Other (comments) Ragweed  04/24/2013    Unknown (comments) Current Immunizations  Reviewed on 12/13/2016 Name Date Influenza Vaccine 11/20/2017 TB Skin Test (PPD) Intradermal 4/18/2017, 4/26/2016 11:00 AM  
 Tdap 11/18/2015 Not reviewed this visit You Were Diagnosed With   
  
 Codes Comments Urinary incontinence, unspecified type    -  Primary ICD-10-CM: R32 
ICD-9-CM: 788.30 Bad odor of urine     ICD-10-CM: R82.90 ICD-9-CM: 791.9 Abnormal urine findings     ICD-10-CM: R82.90 ICD-9-CM: 791.9 Vitals BP Pulse Temp Resp Height(growth percentile) Weight(growth percentile)  108/80 (BP 1 Location: Right arm, BP Patient Position: Sitting) 91 97.5 °F (36.4 °C) (Oral) 18 5' 2\" (1.575 m) 132 lb 6 oz (60 kg) SpO2 BMI OB Status Smoking Status 98% 24.21 kg/m2 Menopause Current Every Day Smoker Vitals History BMI and BSA Data Body Mass Index Body Surface Area  
 24.21 kg/m 2 1.62 m 2 Preferred Pharmacy Pharmacy Name Phone Kathy Ernandez, [address] [phone] Your Updated Medication List  
  
   
This list is accurate as of 4/5/18 12:43 PM.  Always use your most recent med list.  
  
  
  
  
 benztropine 1 mg tablet Commonly known as:  COGENTIN Take  by mouth nightly. cholecalciferol 1,000 unit tablet Commonly known as:  VITAMIN D3 Take 2 Tabs by mouth daily. clotrimazole 1 % topical cream  
Commonly known as:  Margreta Andrew Apply  to affected area two (2) times a day. X 7-10 days DEPAKOTE  mg ER tablet Generic drug:  divalproex ER Take 500 mg by mouth two (2) times a day. fenofibrate 160 mg tablet Commonly known as:  LOFIBRA Take 1 Tab by mouth daily. haloperidol 10 mg tablet Commonly known as:  HALDOL  
  
 haloperidol decanoate 100 mg/mL injection Commonly known as:  HALDOL DECANOATE  
200 mg by IntraMUSCular route every twenty-one (21) days. ibuprofen 400 mg tablet Commonly known as:  MOTRIN Take 1 Tab by mouth every eight (8) hours as needed for Pain.  
  
 levothyroxine 50 mcg tablet Commonly known as:  SYNTHROID Take 1 Tab by mouth Daily (before breakfast). LORazepam 1 mg tablet Commonly known as:  ATIVAN Take 1 Tab by mouth daily as needed (procedural anxiolysis). melatonin 3 mg tablet Take  by mouth.  
  
 memantine 5 mg tablet Commonly known as:  Pollyann Hammad Take 1 Tab by mouth daily. naproxen sodium 220 mg tablet Commonly known as:  NAPROSYN Take 220 mg by mouth two (2) times daily as needed. OLANZapine 5 mg tablet Commonly known as:  ZyPREXA  
 Take  by mouth nightly. pantoprazole 40 mg tablet Commonly known as:  PROTONIX Take 1 Tab by mouth daily. PARoxetine 20 mg tablet Commonly known as:  PAXIL Take  by mouth daily. Indications: should be 10 mg PERDIEM OVERNIGHT RELIEF 15 mg tablet Generic drug:  sennosides Take  by mouth.  
  
 permethrin 5 % topical cream  
Commonly known as:  ACTICIN Apply  to affected area now. apply sparingly as directed  
  
 pravastatin 80 mg tablet Commonly known as:  PRAVACHOL Take 1 Tab by mouth nightly. SEROquel  mg XR tablet Generic drug:  QUEtiapine SR Take 300 mg by mouth nightly. triamcinolone acetonide 0.1 % topical cream  
Commonly known as:  KENALOG Apply  to affected area two (2) times a day. use thin layer as directed x 3-5 days prn We Performed the Following AMB POC URINALYSIS DIP STICK AUTO W/O MICRO [27195 CPT(R)] Follow-up Instructions Return if symptoms worsen or fail to improve. Patient Instructions Urinary Tract Infection in Women: Care Instructions Your Care Instructions A urinary tract infection, or UTI, is a general term for an infection anywhere between the kidneys and the urethra (where urine comes out). Most UTIs are bladder infections. They often cause pain or burning when you urinate. UTIs are caused by bacteria and can be cured with antibiotics. Be sure to complete your treatment so that the infection goes away. Follow-up care is a key part of your treatment and safety. Be sure to make and go to all appointments, and call your doctor if you are having problems. It's also a good idea to know your test results and keep a list of the medicines you take. How can you care for yourself at home? · Take your antibiotics as directed. Do not stop taking them just because you feel better. You need to take the full course of antibiotics. · Drink extra water and other fluids for the next day or two. This may help wash out the bacteria that are causing the infection. (If you have kidney, heart, or liver disease and have to limit fluids, talk with your doctor before you increase your fluid intake.) · Avoid drinks that are carbonated or have caffeine. They can irritate the bladder. · Urinate often. Try to empty your bladder each time. · To relieve pain, take a hot bath or lay a heating pad set on low over your lower belly or genital area. Never go to sleep with a heating pad in place. To prevent UTIs · Drink plenty of water each day. This helps you urinate often, which clears bacteria from your system. (If you have kidney, heart, or liver disease and have to limit fluids, talk with your doctor before you increase your fluid intake.) · Urinate when you need to. · Urinate right after you have sex. · Change sanitary pads often. · Avoid douches, bubble baths, feminine hygiene sprays, and other feminine hygiene products that have deodorants. · After going to the bathroom, wipe from front to back. When should you call for help? Call your doctor now or seek immediate medical care if: 
? · Symptoms such as fever, chills, nausea, or vomiting get worse or appear for the first time. ? · You have new pain in your back just below your rib cage. This is called flank pain. ? · There is new blood or pus in your urine. ? · You have any problems with your antibiotic medicine. ? Watch closely for changes in your health, and be sure to contact your doctor if: 
? · You are not getting better after taking an antibiotic for 2 days. ? · Your symptoms go away but then come back. Where can you learn more? Go to http://pricilla-rfaa.info/. Enter P528 in the search box to learn more about \"Urinary Tract Infection in Women: Care Instructions. \" Current as of: May 12, 2017 Content Version: 11.4 © 4079-4754 Healthwise, Incorporated. Care instructions adapted under license by [employer] (which disclaims liability or warranty for this information). If you have questions about a medical condition or this instruction, always ask your healthcare professional. [address] any warranty or liability for your use of this information. Introducing [facility] & HEALTH SERVICES! Dear Arlene Shore: 
Thank you for requesting a [employer] account. Our records indicate that you already have an active [employer] account. You can access your account anytime at [email or web address] Did you know that you can access your hospital and ER discharge instructions at any time in [employer]? You can also review all of your test results from your hospital stay or ER visit. Additional Information If you have questions, please visit the Frequently Asked Questions section of the [employer] website at [email or web address] Remember, [employer] is NOT to be used for urgent needs. For medical emergencies, dial 911. Now available from your iPhone and Android! Please provide this summary of care documentation to your next provider. Your primary care clinician is listed as [address] If you have any questions after today's visit, please call [phone].

## 2018-04-05 NOTE — PROGRESS NOTES
RM 7    Chief Complaint   Patient presents with    Incontinence     occuring since 3/29/18, no burning sensation, dark urine reported, odor in urine, history of UTI's     Medication Refill     care giver request refill on prn pain medications       1. Have you been to the ER, urgent care clinic since your last visit? Hospitalized since your last visit? No    2. Have you seen or consulted any other health care providers outside of the [address] [place] [address] since your last visit? Include any pap smears or colon screening. No    There are no preventive care reminders to display for this patient.     PHQ over the last two weeks 4/5/2018   Little interest or pleasure in doing things Not at all   Feeling down, depressed or hopeless Not at all   Total Score PHQ 2 0     Learning Assessment 4/5/2018   PRIMARY LEARNER Patient   HIGHEST LEVEL OF EDUCATION - PRIMARY LEARNER  GRADUATED HIGH SCHOOL OR GED   BARRIERS PRIMARY LEARNER COGNITIVE   CO-LEARNER CAREGIVER Yes   CO-LEARNER NAME caregiver   CO-LEARNER HIGHEST LEVEL OF EDUCATION 4 YEARS OF COLLEGE   BARRIERS CO-LEARNER NONE   PRIMARY LANGUAGE ENGLISH   PRIMARY LANGUAGE CO-LEARNER ENGLISH    NEED No   LEARNER PREFERENCE PRIMARY READING   LEARNER PREFERENCE CO-LEARNER READING   LEARNING SPECIAL TOPICS none   ANSWERED BY patient   RELATIONSHIP SELF

## 2018-04-05 NOTE — PROGRESS NOTES
HISTORY OF PRESENT ILLNESS  Anneliese Khan is a 66 y.o. female presents with support staff for acute care  HPI   Since last Thursday she has  had urinary incontinence, strong urine odor and urine darker than usual. She has increased hydration   November 2017 hospitalized with uti    Past Medical History:   Diagnosis Date    Allergic rhinitis     Colon polyps     GERD (gastroesophageal reflux disease)     H/O colonoscopy     History of Papanicolaou smear of cervix 2014    Hypercholesterolemia     hypercholesterolemia    Hypothyroidism     IGT (impaired glucose tolerance)     Kidney tumor (benign)     removed in childhood     Other ill-defined conditions(799.89)     right shoulder bursitis    Psychiatric disorder     schizophrenia    Sch[facility]ia[facility]affective ([facility] [address]     Thyroid disease      Current Outpatient Prescriptions on File Prior to Visit   Medication Sig Dispense Refill    levothyroxine (SYNTHROID) 50 mcg tablet Take 1 Tab by mouth Daily (before breakfast). 30 Tab 5    memantine (NAMENDA) 5 mg tablet Take 1 Tab by mouth daily. 30 Tab 3    OLANZapine (ZYPREXA) 5 mg tablet Take  by mouth nightly.  LORazepam (ATIVAN) 1 mg tablet Take 1 Tab by mouth daily as needed (procedural anxiolysis). 3 Tab 0    fenofibrate (LOFIBRA) 160 mg tablet Take 1 Tab by mouth daily. 30 Tab 5    pravastatin (PRAVACHOL) 80 mg tablet Take 1 Tab by mouth nightly. 30 Tab 5    pantoprazole (PROTONIX) 40 mg tablet Take 1 Tab by mouth daily. 30 Tab 11    cholecalciferol (VITAMIN D3) 1,000 unit tablet Take 2 Tabs by mouth daily. 60 Tab 11    naproxen sodium (NAPROSYN) 220 mg tablet Take 220 mg by mouth two (2) times daily as needed.  PARoxetine (PAXIL) 20 mg tablet Take  by mouth daily. Indications: should be 10 mg      ibuprofen (MOTRIN) 400 mg tablet Take 1 Tab by mouth every eight (8) hours as needed for Pain. 30 Tab 1    benztropine (COGENTIN) 1 mg tablet Take  by mouth nightly.       QUEtiapine SR (SEROQUEL XR) 200 mg XR tablet Take 300 mg by mouth nightly.  haloperidol decanoate (HALDOL DECANOATE) 100 mg/mL injection 200 mg by IntraMUSCular route every twenty-one (21) days.  sennosides (PERDIEM OVERNIGHT RELIEF) 15 mg Tab Take  by mouth.  divalproex ER (DEPAKOTE ER) 500 mg ER tablet Take 500 mg by mouth two (2) times a day.  haloperidol (HALDOL) 10 mg tablet       triamcinolone acetonide (KENALOG) 0.1 % topical cream Apply  to affected area two (2) times a day. use thin layer as directed x 3-5 days prn 30 g 1    clotrimazole (LOTRIMIN) 1 % topical cream Apply  to affected area two (2) times a day. X 7-10 days 45 g 1    permethrin (ACTICIN) 5 % topical cream Apply  to affected area now. apply sparingly as directed       No current facility-administered medications on file prior to visit. Review of Systems   Unable to perform ROS: Psychiatric disorder   Constitutional: Negative for chills and fever. Gastrointestinal: Negative. Genitourinary: Negative for frequency and hematuria. Physical Exam   Constitutional: She appears well-developed and well-nourished. No distress. Eyes: Conjunctivae are normal. Right eye exhibits no discharge. Left eye exhibits no discharge. Cardiovascular: Normal rate and regular rhythm. Pulmonary/Chest: Effort normal and breath sounds normal.   Abdominal: Soft. Bowel sounds are normal. She exhibits no distension and no mass. There is no tenderness. There is no rebound and no guarding. Musculoskeletal: She exhibits no tenderness. No flank tenderness   Skin: Skin is warm and dry. She is not diaphoretic. No erythema. Psychiatric: Her affect is inappropriate. Her speech is tangential. Cognition and memory are impaired. She expresses inappropriate judgment. She is inattentive. ASSESSMENT and PLAN    ICD-10-CM ICD-9-CM    1.  Urinary incontinence, unspecified type R32 788.30 AMB POC URINALYSIS DIP STICK AUTO W/O MICRO CULTURE, URINE   2. Bad odor of urine R82.90 791.9 AMB POC URINALYSIS DIP STICK AUTO W/O MICRO   3. Abnormal urine findings R82.90 791.9 CULTURE, URINE      trimethoprim-sulfamethoxazole (BACTRIM DS, SEPTRA DS) 160-800 mg per tablet     Follow-up Disposition:  Return if symptoms worsen or fail to improve.  lab results and schedule of future lab studies reviewed with patient  reviewed medications and side effects in detail    Urine dip 2+ leukocytes    Discussed with staff treatment options for likely uti. She elects empiric treatment. Strongly encouraged adequate hydration    staff voices understanding and acceptance of this advice and will call back if any further questions or concerns. An After Visit Summary was printed and given to the patient.

## 2018-05-07 ENCOUNTER — CLINICAL SUPPORT (OUTPATIENT)
Dept: INTERNAL MEDICINE CLINIC | Age: 58
End: 2018-05-07

## 2018-05-07 DIAGNOSIS — Z11.1 SCREENING-PULMONARY TB: Primary | ICD-10-CM

## 2018-05-09 LAB
MM INDURATION POC: 0 MM (ref 0–5)
PPD POC: NEGATIVE NEGATIVE

## 2018-05-10 ENCOUNTER — OFFICE VISIT (OUTPATIENT)
Dept: INTERNAL MEDICINE CLINIC | Age: 58
End: 2018-05-10

## 2018-05-10 VITALS
WEIGHT: 129.2 LBS | SYSTOLIC BLOOD PRESSURE: 106 MMHG | TEMPERATURE: 97.5 F | HEART RATE: 84 BPM | OXYGEN SATURATION: 97 % | HEIGHT: 62 IN | DIASTOLIC BLOOD PRESSURE: 76 MMHG | BODY MASS INDEX: 23.77 KG/M2 | RESPIRATION RATE: 16 BRPM

## 2018-05-10 DIAGNOSIS — E55.9 VITAMIN D DEFICIENCY: ICD-10-CM

## 2018-05-10 DIAGNOSIS — Z00.00 PHYSICAL EXAM, ANNUAL: Primary | ICD-10-CM

## 2018-05-10 DIAGNOSIS — K21.9 GASTROESOPHAGEAL REFLUX DISEASE WITHOUT ESOPHAGITIS: ICD-10-CM

## 2018-05-10 DIAGNOSIS — F99 CHRONIC MENTAL ILLNESS: ICD-10-CM

## 2018-05-10 DIAGNOSIS — E03.9 ACQUIRED HYPOTHYROIDISM: ICD-10-CM

## 2018-05-10 LAB
BILIRUB UR QL STRIP: NEGATIVE
GLUCOSE UR-MCNC: NEGATIVE MG/DL
KETONES P FAST UR STRIP-MCNC: NEGATIVE MG/DL
PH UR STRIP: 7 [PH] (ref 4.6–8)
PROT UR QL STRIP: NEGATIVE
SP GR UR STRIP: 1.01 (ref 1–1.03)
UA UROBILINOGEN AMB POC: NORMAL (ref 0.2–1)
URINALYSIS CLARITY POC: CLEAR
URINALYSIS COLOR POC: YELLOW
URINE BLOOD POC: NEGATIVE
URINE LEUKOCYTES POC: NEGATIVE
URINE NITRITES POC: NEGATIVE

## 2018-05-10 NOTE — PROGRESS NOTES
HISTORY OF PRESENT ILLNESS  Chucho Wheeler is a 66 y.o. female group home patient with history of schizophrenia presents with  for physical exam  HPI  Appointment scheduled for end of May for mammogram at [facility]     No recent gyn exam    Colonoscopy 3-4 years ago, needs 10 year follow up    Gyn exam 2 years ago. [facility]    She is independent in ADLs    Unintentional weight loss in 2017, extensive work up unrevealing, weight now stable    Short term memory impairment and behavioral change evaluated by neurology March 2018. Past Medical History:   Diagnosis Date    Allergic rhinitis     Brain atrophy 02/2018    Colon polyps     GERD (gastroesophageal reflux disease)     H/O colonoscopy     History of Papanicolaou smear of cervix 2014    Hypercholesterolemia     hypercholesterolemia    Hypothyroidism     IGT (impaired glucose tolerance)     Kidney tumor (benign)     removed in childhood     Other ill-defined conditions(799.89)     right shoulder bursitis    Psychiatric disorder     schizophrenia    Schizophrenia, schizo-affective ([facility] [address]     Thyroid disease      Past Surgical History:   Procedure Laterality Date    HX UROLOGICAL      tumor removal as child kidney     Current Outpatient Prescriptions on File Prior to Visit   Medication Sig Dispense Refill    levothyroxine (SYNTHROID) 50 mcg tablet Take 1 Tab by mouth Daily (before breakfast). 30 Tab 5    memantine (NAMENDA) 5 mg tablet Take 1 Tab by mouth daily. 30 Tab 3    OLANZapine (ZYPREXA) 5 mg tablet Take  by mouth nightly.  LORazepam (ATIVAN) 1 mg tablet Take 1 Tab by mouth daily as needed (procedural anxiolysis). 3 Tab 0    fenofibrate (LOFIBRA) 160 mg tablet Take 1 Tab by mouth daily. 30 Tab 5    haloperidol (HALDOL) 10 mg tablet       pantoprazole (PROTONIX) 40 mg tablet Take 1 Tab by mouth daily.  30 Tab 11    triamcinolone acetonide (KENALOG) 0.1 % topical cream Apply  to affected area two (2) times a day. use thin layer as directed x 3-5 days prn 30 g 1    clotrimazole (LOTRIMIN) 1 % topical cream Apply  to affected area two (2) times a day. X 7-10 days 45 g 1    PARoxetine (PAXIL) 20 mg tablet Take  by mouth daily. Indications: should be 10 mg      ibuprofen (MOTRIN) 400 mg tablet Take 1 Tab by mouth every eight (8) hours as needed for Pain. 30 Tab 1    permethrin (ACTICIN) 5 % topical cream Apply  to affected area now. apply sparingly as directed      sennosides (PERDIEM OVERNIGHT RELIEF) 15 mg Tab Take  by mouth.  divalproex ER (DEPAKOTE ER) 500 mg ER tablet Take 500 mg by mouth two (2) times a day.  melatonin 3 mg tablet Take  by mouth.  naproxen sodium (NAPROSYN) 220 mg tablet Take 220 mg by mouth two (2) times daily as needed.  benztropine (COGENTIN) 1 mg tablet Take  by mouth nightly.  haloperidol decanoate (HALDOL DECANOATE) 100 mg/mL injection 200 mg by IntraMUSCular route every twenty-one (21) days. No current facility-administered medications on file prior to visit. Review of Systems   Unable to perform ROS: Psychiatric disorder       Physical Exam   Constitutional: She appears well-developed and well-nourished. No distress. Cardiovascular: Normal rate, regular rhythm and normal heart sounds. Pulmonary/Chest: Effort normal and breath sounds normal.   Musculoskeletal: She exhibits no edema, tenderness or deformity. Neurological: She is alert. No cranial nerve deficit. Skin: Skin is warm and dry. No rash noted. She is not diaphoretic. No erythema. Psychiatric: Her affect is inappropriate. Her speech is rapid and/or pressured and tangential. She is hyperactive. Cognition and memory are impaired. She expresses inappropriate judgment. ASSESSMENT and PLAN    ICD-10-CM ICD-9-CM    1. Physical exam, annual Z00.00 V70.0 AMB POC URINALYSIS DIP STICK AUTO W/O MICRO   2. Acquired hypothyroidism E03.9 244.9    3.  Gastroesophageal reflux disease without esophagitis K21.9 530.81    4. Vitamin D deficiency E55.9 268.9    5. Chronic mental illness F99 300.9      Follow-up Disposition:  Return for hyperlipidemia, fasting labs, thyroid disorder. current treatment plan is effective, no change in therapy  lab results and schedule of future lab studies reviewed with patient  reviewed diet, exercise and weight control  reviewed medications and side effects in detail  use of aspirin to prevent MI and TIA's discussed    Continue current management    HM and mmunization current    Support staff voices understanding and acceptance of this advice and will call back if any further questions or concerns. An After Visit Summary was printed and given to the patient.

## 2018-05-10 NOTE — PROGRESS NOTES
Bernard Zacarias is a 66 y.o. female    Chief Complaint   Patient presents with    Complete Physical       1. Have you been to the ER, urgent care clinic since your last visit? Hospitalized since your last visit? No    2. Have you seen or consulted any other health care providers outside of the [address] [place] [address] since your last visit? Include any pap smears or colon screening.   No    Visit Vitals    /76 (BP 1 Location: Right arm, BP Patient Position: Sitting)    Pulse 84    Temp 97.5 °F (36.4 °C) (Oral)    Resp 16    Ht 5' 2\" (1.575 m)    Wt 129 lb 3.2 oz (58.6 kg)    SpO2 97%    BMI 23.63 kg/m2

## 2018-05-10 NOTE — PATIENT INSTRUCTIONS
Well Visit, Women 48 to 72: Care Instructions  Your Care Instructions    Physical exams can help you stay healthy. Your doctor has checked your overall health and may have suggested ways to take good care of yourself. He or she also may have recommended tests. At home, you can help prevent illness with healthy eating, regular exercise, and other steps. Follow-up care is a key part of your treatment and safety. Be sure to make and go to all appointments, and call your doctor if you are having problems. It's also a good idea to know your test results and keep a list of the medicines you take. How can you care for yourself at home? · Reach and stay at a healthy weight. This will lower your risk for many problems, such as obesity, diabetes, heart disease, and high blood pressure. · Get at least 30 minutes of exercise on most days of the week. Walking is a good choice. You also may want to do other activities, such as running, swimming, cycling, or playing tennis or team sports. · Do not smoke. Smoking can make health problems worse. If you need help quitting, talk to your doctor about stop-smoking programs and medicines. These can increase your chances of quitting for good. · Protect your skin from too much sun. When you're outdoors from 10 a.m. to 4 p.m., stay in the shade or cover up with clothing and a hat with a wide brim. Wear sunglasses that block UV rays. Even when it's cloudy, put broad-spectrum sunscreen (SPF 30 or higher) on any exposed skin. · See a dentist one or two times a year for checkups and to have your teeth cleaned. · Wear a seat belt in the car. · Limit alcohol to 1 drink a day. Too much alcohol can cause health problems. Follow your doctor's advice about when to have certain tests. These tests can spot problems early. · Cholesterol.  Your doctor will tell you how often to have this done based on your age, family history, or other things that can increase your risk for heart attack and stroke. · Blood pressure. Have your blood pressure checked during a routine doctor visit. Your doctor will tell you how often to check your blood pressure based on your age, your blood pressure results, and other factors. · Mammogram. Ask your doctor how often you should have a mammogram, which is an X-ray of your breasts. A mammogram can spot breast cancer before it can be felt and when it is easiest to treat. · Pap test and pelvic exam. Ask your doctor how often you should have a Pap test. You may not need to have a Pap test as often as you used to. · Vision. Have your eyes checked every year or two or as often as your doctor suggests. Some experts recommend that you have yearly exams for glaucoma and other age-related eye problems starting at age 48. · Hearing. Tell your doctor if you notice any change in your hearing. You can have tests to find out how well you hear. · Diabetes. Ask your doctor whether you should have tests for diabetes. · Colon cancer. You should begin tests for colon cancer at age 48. You may have one of several tests. Your doctor will tell you how often to have tests based on your age and risk. Risks include whether you already had a precancerous polyp removed from your colon or whether your parents, sisters and brothers, or children have had colon cancer. · Thyroid disease. Talk to your doctor about whether to have your thyroid checked as part of a regular physical exam. Women have an increased chance of a thyroid problem. · Osteoporosis. You should begin tests for bone density at age 72. If you are younger than 72, ask your doctor whether you have factors that may increase your risk for this disease. You may want to have this test before age 72. · Heart attack and stroke risk. At least every 4 to 6 years, you should have your risk for heart attack and stroke assessed.  Your doctor uses factors such as your age, blood pressure, cholesterol, and whether you smoke or have diabetes to show what your risk for a heart attack or stroke is over the next 10 years. When should you call for help? Watch closely for changes in your health, and be sure to contact your doctor if you have any problems or symptoms that concern you. Where can you learn more? Go to http://pricilla-rafa.info/. Enter C876 in the search box to learn more about \"Well Visit, Women 50 to 72: Care Instructions. \"  Current as of: May 12, 2017  Content Version: 11.4  © 1492-3076 [employer]. Care instructions adapted under license by [employer] (which disclaims liability or warranty for this information). If you have questions about a medical condition or this instruction, always ask your healthcare professional. [address] any warranty or liability for your use of this information. Gastroesophageal Reflux Disease (GERD): Care Instructions  Your Care Instructions    Gastroesophageal reflux disease (GERD) is the backward flow of stomach acid into the esophagus. The esophagus is the tube that leads from your throat to your stomach. A one-way valve prevents the stomach acid from moving up into this tube. When you have GERD, this valve does not close tightly enough. If you have mild GERD symptoms including heartburn, you may be able to control the problem with antacids or over-the-counter medicine. Changing your diet, losing weight, and making other lifestyle changes can also help reduce symptoms. Follow-up care is a key part of your treatment and safety. Be sure to make and go to all appointments, and call your doctor if you are having problems. It's also a good idea to know your test results and keep a list of the medicines you take. How can you care for yourself at home? · Take your medicines exactly as prescribed. Call your doctor if you think you are having a problem with your medicine. · Your doctor may recommend over-the-counter medicine.  For mild or occasional indigestion, antacids, such as Tums, Gaviscon, Mylanta, or Maalox, may help. Your doctor also may recommend over-the-counter acid reducers, such as Pepcid AC, Tagamet HB, Zantac 75, or Prilosec. Read and follow all instructions on the label. If you use these medicines often, talk with your doctor. · Change your eating habits. ¨ It's best to eat several small meals instead of two or three large meals. ¨ After you eat, wait 2 to 3 hours before you lie down. ¨ Chocolate, mint, and alcohol can make GERD worse. ¨ Spicy foods, foods that have a lot of acid (like tomatoes and oranges), and coffee can make GERD symptoms worse in some people. If your symptoms are worse after you eat a certain food, you may want to stop eating that food to see if your symptoms get better. · Do not smoke or chew tobacco. Smoking can make GERD worse. If you need help quitting, talk to your doctor about stop-smoking programs and medicines. These can increase your chances of quitting for good. · If you have GERD symptoms at night, raise the head of your bed 6 to 8 inches by putting the frame on blocks or placing a foam wedge under the head of your mattress. (Adding extra pillows does not work.)  · Do not wear tight clothing around your middle. · Lose weight if you need to. Losing just 5 to 10 pounds can help. When should you call for help? Call your doctor now or seek immediate medical care if:  ? · You have new or different belly pain. ? · Your stools are black and tarlike or have streaks of blood. ? Watch closely for changes in your health, and be sure to contact your doctor if:  ? · Your symptoms have not improved after 2 days. ? · Food seems to catch in your throat or chest.   Where can you learn more? Go to http://pricilla-rafa.info/. Enter S457 in the search box to learn more about \"Gastroesophageal Reflux Disease (GERD): Care Instructions. \"  Current as of:  May 12, 2017  Content Version: 11.4  © 0587-8112 Healthwise, Incorporated. Care instructions adapted under license by [employer] (which disclaims liability or warranty for this information). If you have questions about a medical condition or this instruction, always ask your healthcare professional. [address] any warranty or liability for your use of this information.

## 2018-05-10 NOTE — MR AVS SNAPSHOT
[address]  [address] [place] [address] 
[phone] Patient: Nick Fraser MRN: [id number] JVS:1/9/2096 Visit Information Date & Time Provider Department Dept. Phone Encounter #  
 5/10/2018 11:30 AM James Salazar [id number] [id number] Follow-up Instructions Return for hyperlipidemia, fasting labs, thyroid disorder. Your Appointments 7/16/2018  9:45 AM  
ROUTINE CARE with Charly López MD  
[facility] [address] [place] [address]) Appt Note: r/s  
 [address] [facility] [address] [facility] [address]  
[facility] [address] [facility] [address] Upcoming Health Maintenance Date Due  
 MEDICARE YEARLY EXAM 4/19/2018 Influenza Age 5 to Adult 8/1/2018 BREAST CANCER SCRN MAMMOGRAM 5/9/2019 COLONOSCOPY 6/27/2019 PAP AKA CERVICAL CYTOLOGY 4/28/2020 DTaP/Tdap/Td series (2 - Td) 11/18/2025 Allergies as of 5/10/2018  Review Complete On: 5/10/2018 By: Ana Merchant NP Severity Noted Reaction Type Reactions Bee Sting [Sting, Bee]  04/24/2013    Unknown (comments) Bees [Hymenoptera Allergenic Extract]  10/07/2015    Other (comments) Ragweed  04/24/2013    Unknown (comments) Current Immunizations  Reviewed on 12/13/2016 Name Date Influenza Vaccine 11/20/2017 TB Skin Test (PPD) Intradermal 5/7/2018 12:00 PM, 4/18/2017, 4/26/2016 11:00 AM  
 Tdap 11/18/2015 Not reviewed this visit You Were Diagnosed With   
  
 Codes Comments Physical exam, annual    -  Primary ICD-10-CM: Z00.00 ICD-9-CM: V70.0 Acquired hypothyroidism     ICD-10-CM: E03.9 ICD-9-CM: 244.9 Gastroesophageal reflux disease without esophagitis     ICD-10-CM: K21.9 ICD-9-CM: 530.81 Vitamin D deficiency     ICD-10-CM: E55.9 ICD-9-CM: 268.9 Vitals BP Pulse Temp Resp Height(growth percentile) Weight(growth percentile) 106/76 (BP 1 Location: Right arm, BP Patient Position: Sitting) 84 97.5 °F (36.4 °C) (Oral) 16 5' 2\" (1.575 m) 129 lb 3.2 oz (58.6 kg) SpO2 BMI OB Status Smoking Status 97% 23.63 kg/m2 Menopause Current Every Day Smoker Vitals History BMI and BSA Data Body Mass Index Body Surface Area  
 23.63 kg/m 2 1.6 m 2 Preferred Pharmacy Pharmacy Name Phone [facility] [phone] Your Updated Medication List  
  
   
This list is accurate as of 5/10/18 12:12 PM.  Always use your most recent med list.  
  
  
  
  
 benztropine 1 mg tablet Commonly known as:  COGENTIN Take  by mouth nightly. cholecalciferol 1,000 unit tablet Commonly known as:  VITAMIN D3 Take 2 Tabs by mouth daily. clotrimazole 1 % topical cream  
Commonly known as:  Tristin Cargo Apply  to affected area two (2) times a day. X 7-10 days DEPAKOTE  mg ER tablet Generic drug:  divalproex ER Take 500 mg by mouth two (2) times a day. fenofibrate 160 mg tablet Commonly known as:  LOFIBRA Take 1 Tab by mouth daily. haloperidol 10 mg tablet Commonly known as:  HALDOL  
  
 haloperidol decanoate 100 mg/mL injection Commonly known as:  HALDOL DECANOATE  
200 mg by IntraMUSCular route every twenty-one (21) days. ibuprofen 400 mg tablet Commonly known as:  MOTRIN Take 1 Tab by mouth every eight (8) hours as needed for Pain.  
  
 levothyroxine 50 mcg tablet Commonly known as:  SYNTHROID Take 1 Tab by mouth Daily (before breakfast). LORazepam 1 mg tablet Commonly known as:  ATIVAN Take 1 Tab by mouth daily as needed (procedural anxiolysis). melatonin 3 mg tablet Take  by mouth.  
  
 memantine 5 mg tablet Commonly known as:  Pollyann Balsam Take 1 Tab by mouth daily. naproxen sodium 220 mg tablet Commonly known as:  NAPROSYN Take 220 mg by mouth two (2) times daily as needed. OLANZapine 5 mg tablet Commonly known as:  ZyPREXA Take  by mouth nightly. pantoprazole 40 mg tablet Commonly known as:  PROTONIX Take 1 Tab by mouth daily. PARoxetine 20 mg tablet Commonly known as:  PAXIL Take  by mouth daily. Indications: should be 10 mg PERDIEM OVERNIGHT RELIEF 15 mg tablet Generic drug:  sennosides Take  by mouth.  
  
 permethrin 5 % topical cream  
Commonly known as:  ACTICIN Apply  to affected area now. apply sparingly as directed  
  
 pravastatin 80 mg tablet Commonly known as:  PRAVACHOL Take 1 Tab by mouth nightly. SEROquel  mg XR tablet Generic drug:  QUEtiapine SR Take 300 mg by mouth nightly. triamcinolone acetonide 0.1 % topical cream  
Commonly known as:  KENALOG Apply  to affected area two (2) times a day. use thin layer as directed x 3-5 days prn We Performed the Following AMB POC URINALYSIS DIP STICK AUTO W/O MICRO [22080 CPT(R)] Follow-up Instructions Return for hyperlipidemia, fasting labs, thyroid disorder. Patient Instructions Well Visit, Women 48 to 72: Care Instructions Your Care Instructions Physical exams can help you stay healthy. Your doctor has checked your overall health and may have suggested ways to take good care of yourself. He or she also may have recommended tests. At home, you can help prevent illness with healthy eating, regular exercise, and other steps. Follow-up care is a key part of your treatment and safety. Be sure to make and go to all appointments, and call your doctor if you are having problems. It's also a good idea to know your test results and keep a list of the medicines you take. How can you care for yourself at home? · Reach and stay at a healthy weight.  This will lower your risk for many problems, such as obesity, diabetes, heart disease, and high blood pressure. · Get at least 30 minutes of exercise on most days of the week. Walking is a good choice. You also may want to do other activities, such as running, swimming, cycling, or playing tennis or team sports. · Do not smoke. Smoking can make health problems worse. If you need help quitting, talk to your doctor about stop-smoking programs and medicines. These can increase your chances of quitting for good. · Protect your skin from too much sun. When you're outdoors from 10 a.m. to 4 p.m., stay in the shade or cover up with clothing and a hat with a wide brim. Wear sunglasses that block UV rays. Even when it's cloudy, put broad-spectrum sunscreen (SPF 30 or higher) on any exposed skin. · See a dentist one or two times a year for checkups and to have your teeth cleaned. · Wear a seat belt in the car. · Limit alcohol to 1 drink a day. Too much alcohol can cause health problems. Follow your doctor's advice about when to have certain tests. These tests can spot problems early. · Cholesterol. Your doctor will tell you how often to have this done based on your age, family history, or other things that can increase your risk for heart attack and stroke. · Blood pressure. Have your blood pressure checked during a routine doctor visit. Your doctor will tell you how often to check your blood pressure based on your age, your blood pressure results, and other factors. · Mammogram. Ask your doctor how often you should have a mammogram, which is an X-ray of your breasts. A mammogram can spot breast cancer before it can be felt and when it is easiest to treat. · Pap test and pelvic exam. Ask your doctor how often you should have a Pap test. You may not need to have a Pap test as often as you used to. · Vision. Have your eyes checked every year or two or as often as your doctor suggests.  Some experts recommend that you have yearly exams for glaucoma and other age-related eye problems starting at age 48. · Hearing. Tell your doctor if you notice any change in your hearing. You can have tests to find out how well you hear. · Diabetes. Ask your doctor whether you should have tests for diabetes. · Colon cancer. You should begin tests for colon cancer at age 48. You may have one of several tests. Your doctor will tell you how often to have tests based on your age and risk. Risks include whether you already had a precancerous polyp removed from your colon or whether your parents, sisters and brothers, or children have had colon cancer. · Thyroid disease. Talk to your doctor about whether to have your thyroid checked as part of a regular physical exam. Women have an increased chance of a thyroid problem. · Osteoporosis. You should begin tests for bone density at age 72. If you are younger than 72, ask your doctor whether you have factors that may increase your risk for this disease. You may want to have this test before age 72. · Heart attack and stroke risk. At least every 4 to 6 years, you should have your risk for heart attack and stroke assessed. Your doctor uses factors such as your age, blood pressure, cholesterol, and whether you smoke or have diabetes to show what your risk for a heart attack or stroke is over the next 10 years. When should you call for help? Watch closely for changes in your health, and be sure to contact your doctor if you have any problems or symptoms that concern you. Where can you learn more? Go to http://pricilla-rafa.info/. Enter X254 in the search box to learn more about \"Well Visit, Women 50 to 72: Care Instructions. \" Current as of: May 12, 2017 Content Version: 11.4 © 1947-5954 [email or web address]. Care instructions adapted under license by [employer] (which disclaims liability or warranty for this information).  If you have questions about a medical condition or this instruction, always ask your healthcare professional. [place] [address] any warranty or liability for your use of this information. Gastroesophageal Reflux Disease (GERD): Care Instructions Your Care Instructions Gastroesophageal reflux disease (GERD) is the backward flow of stomach acid into the esophagus. The esophagus is the tube that leads from your throat to your stomach. A one-way valve prevents the stomach acid from moving up into this tube. When you have GERD, this valve does not close tightly enough. If you have mild GERD symptoms including heartburn, you may be able to control the problem with antacids or over-the-counter medicine. Changing your diet, losing weight, and making other lifestyle changes can also help reduce symptoms. Follow-up care is a key part of your treatment and safety. Be sure to make and go to all appointments, and call your doctor if you are having problems. It's also a good idea to know your test results and keep a list of the medicines you take. How can you care for yourself at home? · Take your medicines exactly as prescribed. Call your doctor if you think you are having a problem with your medicine. · Your doctor may recommend over-the-counter medicine. For mild or occasional indigestion, antacids, such as Tums, Gaviscon, Mylanta, or Maalox, may help. Your doctor also may recommend over-the-counter acid reducers, such as Pepcid AC, Tagamet HB, Zantac 75, or Prilosec. Read and follow all instructions on the label. If you use these medicines often, talk with your doctor. · Change your eating habits. ¨ It's best to eat several small meals instead of two or three large meals. ¨ After you eat, wait 2 to 3 hours before you lie down. ¨ Chocolate, mint, and alcohol can make GERD worse. ¨ Spicy foods, foods that have a lot of acid (like tomatoes and oranges), and coffee can make GERD symptoms worse in some people.  If your symptoms are worse after you eat a certain food, you may want to stop eating that food to see if your symptoms get better. · Do not smoke or chew tobacco. Smoking can make GERD worse. If you need help quitting, talk to your doctor about stop-smoking programs and medicines. These can increase your chances of quitting for good. · If you have GERD symptoms at night, raise the head of your bed 6 to 8 inches by putting the frame on blocks or placing a foam wedge under the head of your mattress. (Adding extra pillows does not work.) · Do not wear tight clothing around your middle. · Lose weight if you need to. Losing just 5 to 10 pounds can help. When should you call for help? Call your doctor now or seek immediate medical care if: 
? · You have new or different belly pain. ? · Your stools are black and tarlike or have streaks of blood. ? Watch closely for changes in your health, and be sure to contact your doctor if: 
? · Your symptoms have not improved after 2 days. ? · Food seems to catch in your throat or chest.  
Where can you learn more? Go to http://pricilla-rafa.info/. Enter Y911 in the search box to learn more about \"Gastroesophageal Reflux Disease (GERD): Care Instructions. \" Current as of: May 12, 2017 Content Version: 11.4 © 5357-6843 Healthwise, Incorporated. Care instructions adapted under license by [employer] (which disclaims liability or warranty for this information). If you have questions about a medical condition or this instruction, always ask your healthcare professional. [place] [address] any warranty or liability for your use of this information. Introducing [facility]! Dear Boom Bryant: 
Thank you for requesting a [employer] account. Our records indicate that you already have an active [employer] account. You can access your account anytime at [email or web address] Did you know that you can access your hospital and ER discharge instructions at any time in [email or web address]? You can also review all of your test results from your hospital stay or ER visit. Additional Information If you have questions, please visit the Frequently Asked Questions section of the [email or web address] website at [email or web address] Remember, [email or web address] is NOT to be used for urgent needs. For medical emergencies, dial 911. Now available from your iPhone and Android! Please provide this summary of care documentation to your next provider. Your primary care clinician is listed as [address] If you have any questions after today's visit, please call [phone].

## 2018-05-16 DIAGNOSIS — E78.00 HYPERCHOLESTEROLEMIA: ICD-10-CM

## 2018-05-16 DIAGNOSIS — E55.9 VITAMIN D DEFICIENCY: ICD-10-CM

## 2018-05-16 RX ORDER — PRAVASTATIN SODIUM 80 MG/1
80 TABLET ORAL
Qty: 90 TAB | Refills: 1 | Status: SHIPPED | OUTPATIENT
Start: 2018-05-16 | End: 2018-11-23 | Stop reason: SDUPTHER

## 2018-05-16 RX ORDER — MELATONIN
2000 DAILY
Qty: 180 TAB | Refills: 3 | Status: SHIPPED | OUTPATIENT
Start: 2018-05-16 | End: 2019-05-28 | Stop reason: SDUPTHER

## 2018-05-16 NOTE — TELEPHONE ENCOUNTER
Medication refill request:    Last Office Visit:5/10/18  Next Office Visit:  Future Appointments  Date Time Provider Parmjit Contreras   7/16/2018 9:45 AM Musa Rivera MD CP [address] [facility] verified. Yes    Patient requesting 90 day supply.

## 2018-05-21 ENCOUNTER — OFFICE VISIT (OUTPATIENT)
Dept: INTERNAL MEDICINE CLINIC | Age: 58
End: 2018-05-21

## 2018-05-21 VITALS
BODY MASS INDEX: 23.85 KG/M2 | TEMPERATURE: 97.4 F | OXYGEN SATURATION: 98 % | DIASTOLIC BLOOD PRESSURE: 68 MMHG | RESPIRATION RATE: 18 BRPM | HEIGHT: 62 IN | WEIGHT: 129.6 LBS | SYSTOLIC BLOOD PRESSURE: 99 MMHG | HEART RATE: 77 BPM

## 2018-05-21 DIAGNOSIS — R14.0 ABDOMINAL BLOATING: ICD-10-CM

## 2018-05-21 DIAGNOSIS — R32 URINARY INCONTINENCE, UNSPECIFIED TYPE: ICD-10-CM

## 2018-05-21 DIAGNOSIS — R63.4 WEIGHT LOSS, NON-INTENTIONAL: ICD-10-CM

## 2018-05-21 DIAGNOSIS — L73.9 ACUTE FOLLICULITIS: ICD-10-CM

## 2018-05-21 DIAGNOSIS — R35.0 FREQUENT URINATION: Primary | ICD-10-CM

## 2018-05-21 DIAGNOSIS — R32 ENURESIS: ICD-10-CM

## 2018-05-21 RX ORDER — QUETIAPINE 400 MG/1
TABLET, FILM COATED, EXTENDED RELEASE ORAL
COMMUNITY
Start: 2018-05-01 | End: 2018-09-18 | Stop reason: DRUGHIGH

## 2018-05-21 RX ORDER — HALOPERIDOL 5 MG/1
5 TABLET ORAL
COMMUNITY
Start: 2018-05-01

## 2018-05-21 NOTE — PROGRESS NOTES
HISTORY OF PRESENT ILLNESS  Arnold Bright is a 66 y.o. female with history of mental illness presents with saff  for acute care  HPI  Staff reports patient has had urinary frequency and increase enuresis    Staff admits she is not familiar with patient , however, she also wants patient to be evaluated for     Abdominal  boating    Hair loss    Weight loss    Patient reports she has a bump on vagina since this weeked. Evaluated by staff and advised to apply Vaseline. Past Medical History:   Diagnosis Date    Allergic rhinitis     Brain atrophy 02/2018    Colon polyps     GERD (gastroesophageal reflux disease)     H/O colonoscopy     History of Papanicolaou smear of cervix 2014    Hypercholesterolemia     hypercholesterolemia    Hypothyroidism     IGT (impaired glucose tolerance)     Kidney tumor (benign)     removed in childhood     Other ill-defined conditions(799.89)     right shoulder bursitis    Psychiatric disorder     schizophrenia    Schizophrenia, schizo-affective ([facility] [address]     Thyroid disease        Past Surgical History:   Procedure Laterality Date    HX UROLOGICAL      tumor removal as child kidney       Current Outpatient Prescriptions on File Prior to Visit   Medication Sig Dispense Refill    pravastatin (PRAVACHOL) 80 mg tablet Take 1 Tab by mouth nightly. 90 Tab 1    cholecalciferol (VITAMIN D3) 1,000 unit tablet Take 2 Tabs by mouth daily. 180 Tab 3    levothyroxine (SYNTHROID) 50 mcg tablet Take 1 Tab by mouth Daily (before breakfast). 30 Tab 5    memantine (NAMENDA) 5 mg tablet Take 1 Tab by mouth daily. 30 Tab 3    fenofibrate (LOFIBRA) 160 mg tablet Take 1 Tab by mouth daily. 30 Tab 5    haloperidol (HALDOL) 10 mg tablet       pantoprazole (PROTONIX) 40 mg tablet Take 1 Tab by mouth daily. 30 Tab 11    triamcinolone acetonide (KENALOG) 0.1 % topical cream Apply  to affected area two (2) times a day.  use thin layer as directed x 3-5 days prn 30 g 1    clotrimazole (LOTRIMIN) 1 % topical cream Apply  to affected area two (2) times a day. X 7-10 days 45 g 1    naproxen sodium (NAPROSYN) 220 mg tablet Take 220 mg by mouth two (2) times daily as needed.  PARoxetine (PAXIL) 20 mg tablet Take  by mouth daily. Indications: should be 10 mg      ibuprofen (MOTRIN) 400 mg tablet Take 1 Tab by mouth every eight (8) hours as needed for Pain. 30 Tab 1    permethrin (ACTICIN) 5 % topical cream Apply  to affected area now. apply sparingly as directed      sennosides (PERDIEM OVERNIGHT RELIEF) 15 mg Tab Take  by mouth.  divalproex ER (DEPAKOTE ER) 500 mg ER tablet Take 500 mg by mouth two (2) times a day.  melatonin 3 mg tablet Take  by mouth.  OLANZapine (ZYPREXA) 5 mg tablet Take  by mouth nightly.  LORazepam (ATIVAN) 1 mg tablet Take 1 Tab by mouth daily as needed (procedural anxiolysis). 3 Tab 0    benztropine (COGENTIN) 1 mg tablet Take  by mouth nightly.  haloperidol decanoate (HALDOL DECANOATE) 100 mg/mL injection 200 mg by IntraMUSCular route every twenty-one (21) days. No current facility-administered medications on file prior to visit. Review of Systems   Constitutional: Negative for chills and fever. Gastrointestinal: Negative. Negative for abdominal pain, constipation and diarrhea. Genitourinary: Positive for frequency. Negative for dysuria, flank pain, hematuria and urgency. Neurological: Negative for dizziness. Physical Exam   Constitutional: She is oriented to person, place, and time. She appears well-developed and well-nourished. No distress. Cardiovascular: Normal rate, regular rhythm and normal heart sounds. Pulmonary/Chest: Effort normal and breath sounds normal.   Musculoskeletal: She exhibits no edema, tenderness or deformity. Neurological: She is alert and oriented to person, place, and time. Skin: Skin is warm and dry. She is not diaphoretic. ASSESSMENT and PLAN    ICD-10-CM ICD-9-CM    1.  Frequent urination R35.0 788.41 AMB POC URINALYSIS DIP STICK AUTO W/O MICRO      REFERRAL TO UROLOGY   2. Urinary incontinence, unspecified type R32 788.30 REFERRAL TO UROLOGY   3. Enuresis R32 788.30 REFERRAL TO UROLOGY   4. Abdominal bloating R14.0 787.3 US ABD COMP   5. Weight loss, non-intentional R63.4 783.21    6. Acute folliculitis D83.2 019.7      Follow-up Disposition:  Return if symptoms worsen or fail to improve.  lab results and schedule of future lab studies reviewed with patient     1,2,3, Discussed normal urine dip. Recommended urology evaluation    5.. Weight now stable, prior work up unrevealing. 6. Instructed to continue current medication, avoid squeezing, apply warm compresses     An After Visit Summary was printed and given to the patient.

## 2018-05-21 NOTE — PATIENT INSTRUCTIONS
Gas and Bloating: Care Instructions  Your Care Instructions    Gas and bloating can be uncomfortable and embarrassing problems. All people pass gas, but some people produce more gas than others, sometimes enough to cause distress. It is normal to pass gas from 6 to 20 times per day. Excess gas usually is not caused by a serious health problem. Gas and bloating usually are caused by something you eat or drink, including some food supplements and medicines. Gas and bloating are usually harmless and go away without treatment. However, changing your diet can help end the problem. Some over-the-counter medicines can help prevent gas and relieve bloating. Follow-up care is a key part of your treatment and safety. Be sure to make and go to all appointments, and call your doctor if you are having problems. It's also a good idea to know your test results and keep a list of the medicines you take. How can you care for yourself at home? · Keep a food diary if you think a food gives you gas. Write down what you eat or drink. Also record when you get gas. If you notice that a food seems to cause your gas each time, avoid it and see if the gas goes away. Examples of foods that cause gas include:  ¨ Fried and fatty foods. ¨ Beans. ¨ Vegetables such as artichokes, asparagus, broccoli, brussels sprouts, cabbage, cauliflower, cucumbers, green peppers, onions, peas, radishes, and raw potatoes. ¨ Fruits such as apricots, bananas, melons, peaches, pears, prunes, and raw apples. ¨ Wheat and wheat bran. · Soak dry beans in water overnight, then dump the water and cook the soaked beans in new water. This can help prevent gas and bloating. · If you have problems with lactose, avoid dairy products such as milk and cheese. · Try not to swallow air. Do not drink through a straw, gulp your food, or chew gum. · Take an over-the-counter medicine. Read and follow all instructions on the label.   ¨ Food enzymes, such as Beano, can be added to gas-producing foods to prevent gas. ¨ Antacids, such as Maalox Anti-Gas and Mylanta Gas, can relieve bloating by making you burp. Be careful when you take over-the-counter antacid medicines. Many of these medicines have aspirin in them. Read the label to make sure that you are not taking more than the recommended dose. Too much aspirin can be harmful. ¨ Activated charcoal tablets, such as CharcoCaps, may decrease odor from gas you pass. ¨ If you have problems with lactose, you can take medicines such as Dairy Ease and Lactaid with dairy products to prevent gas and bloating. · Get some exercise regularly. When should you call for help? Call 911 anytime you think you may need emergency care. For example, call if:  ? · You have gas and signs of a heart attack, such as:  ¨ Chest pain or pressure. ¨ Sweating. ¨ Shortness of breath. ¨ Nausea or vomiting. ¨ Pain that spreads from the chest to the neck, jaw, or one or both shoulders or arms. ¨ Dizziness or lightheadedness. ¨ A fast or uneven pulse. After calling 911, chew 1 adult-strength aspirin. Wait for an ambulance. Do not try to drive yourself. ?Call your doctor now or seek immediate medical care if:  ? · You have severe belly pain. ? · You have blood in your stool. ? Watch closely for changes in your health, and be sure to contact your doctor if:  ? · You have blood or pus in your urine. ? · Your urine is cloudy or smells bad.   ? · You are burping and have trouble swallowing. ? · You feel bloated and have swelling in your belly. ? · You do not get better as expected. Where can you learn more? Go to http://pricilla-rafa.info/. Enter D969 in the search box to learn more about \"Gas and Bloating: Care Instructions. \"  Current as of: March 20, 2017  Content Version: 11.4  © 8012-0204 [employer].  Care instructions adapted under license by [employer] (which disclaims liability or warranty for this information). If you have questions about a medical condition or this instruction, always ask your healthcare professional. [place] [address] any warranty or liability for your use of this information.

## 2018-05-21 NOTE — MR AVS SNAPSHOT
[address]  [address] [place] [address] 
[phone] Patient: Soila Villalobos MRN: [id number] UGZ:[id number] Visit Information Date & Time Provider Department Dept. Phone Encounter #  
 5/21/2018 11:30 AM James Isbell [id number] Pediatric[facility] [phone] [id number] Follow-up Instructions Return if symptoms worsen or fail to improve. Your Appointments 7/16/2018  9:45 AM  
ROUTINE CARE with James Aguilar MD  
[facility] Pediatrics and Internal Medicine [address] [facility]) Appt Note: r/s  
 [address] [facility] [address] [facility] [address]  
[facility] [address] [place] [id number] Upcoming Health Maintenance Date Due  
 MEDICARE YEARLY EXAM 4/19/2018 Influenza Age 5 to Adult 8/1/2018 BREAST CANCER SCRN MAMMOGRAM 5/9/2019 COLONOSCOPY 6/27/2019 PAP AKA CERVICAL CYTOLOGY 4/28/2020 DTaP/Tdap/Td series (2 - Td) 11/18/2025 Allergies as of 5/21/2018  Review Complete On: 5/21/2018 By: Dionicio Guzman NP Severity Noted Reaction Type Reactions Bee Sting [Sting, Bee]  04/24/2013    Unknown (comments) Bees [Hymenoptera Allergenic Extract]  10/07/2015    Other (comments) Ragweed  04/24/2013    Unknown (comments) Current Immunizations  Reviewed on 12/13/2016 Name Date Influenza Vaccine 11/20/2017 TB Skin Test (PPD) Intradermal 5/7/2018 12:00 PM, 4/18/2017, 4/26/2016 11:00 AM  
 Tdap 11/18/2015 Not reviewed this visit You Were Diagnosed With   
  
 Codes Comments Frequent urination    -  Primary ICD-10-CM: R35.0 ICD-9-CM: 788.41 Abdominal bloating     ICD-10-CM: R14.0 ICD-9-CM: 787.3 Enuresis     ICD-10-CM: R32 
ICD-9-CM: 788.30 Urinary incontinence, unspecified type     ICD-10-CM: R32 
ICD-9-CM: 788.30 Vitals BP Pulse Temp Resp Height(growth percentile) Weight(growth percentile) 99/68 (BP 1 Location: Left arm, BP Patient Position: Sitting) 77 97.4 °F (36.3 °C) (Oral) 18 5' 2\" (1.575 m) 129 lb 9.6 oz (58.8 kg) SpO2 BMI OB Status Smoking Status 98% 23.7 kg/m2 Menopause Current Every Day Smoker Vitals History BMI and BSA Data Body Mass Index Body Surface Area  
 23.7 kg/m 2 1.6 m 2 Preferred Pharmacy Pharmacy Name Phone [facility], [address] [phone] Your Updated Medication List  
  
   
This list is accurate as of 5/21/18 12:17 PM.  Always use your most recent med list.  
  
  
  
  
 benztropine 1 mg tablet Commonly known as:  COGENTIN Take  by mouth nightly. cholecalciferol 1,000 unit tablet Commonly known as:  VITAMIN D3 Take 2 Tabs by mouth daily. clotrimazole 1 % topical cream  
Commonly known as:  Bernadette Sandifer Apply  to affected area two (2) times a day. X 7-10 days DEPAKOTE  mg ER tablet Generic drug:  divalproex ER Take 500 mg by mouth two (2) times a day. fenofibrate 160 mg tablet Commonly known as:  LOFIBRA Take 1 Tab by mouth daily. * haloperidol 10 mg tablet Commonly known as:  HALDOL * haloperidol 5 mg tablet Commonly known as:  HALDOL  
  
 haloperidol decanoate 100 mg/mL injection Commonly known as:  HALDOL DECANOATE  
200 mg by IntraMUSCular route every twenty-one (21) days. ibuprofen 400 mg tablet Commonly known as:  MOTRIN Take 1 Tab by mouth every eight (8) hours as needed for Pain.  
  
 levothyroxine 50 mcg tablet Commonly known as:  SYNTHROID Take 1 Tab by mouth Daily (before breakfast). LORazepam 1 mg tablet Commonly known as:  ATIVAN Take 1 Tab by mouth daily as needed (procedural anxiolysis). melatonin 3 mg tablet Take  by mouth.  
  
 memantine 5 mg tablet Commonly known as:  Aris Hung  
 Take 1 Tab by mouth daily. naproxen sodium 220 mg tablet Commonly known as:  NAPROSYN Take 220 mg by mouth two (2) times daily as needed. OLANZapine 5 mg tablet Commonly known as:  ZyPREXA Take  by mouth nightly. pantoprazole 40 mg tablet Commonly known as:  PROTONIX Take 1 Tab by mouth daily. PARoxetine 20 mg tablet Commonly known as:  PAXIL Take  by mouth daily. Indications: should be 10 mg PERDIEM OVERNIGHT RELIEF 15 mg tablet Generic drug:  sennosides Take  by mouth.  
  
 permethrin 5 % topical cream  
Commonly known as:  ACTICIN Apply  to affected area now. apply sparingly as directed  
  
 pravastatin 80 mg tablet Commonly known as:  PRAVACHOL Take 1 Tab by mouth nightly. QUEtiapine  mg sr tablet Commonly known as:  SEROquel XR  
  
 triamcinolone acetonide 0.1 % topical cream  
Commonly known as:  KENALOG Apply  to affected area two (2) times a day. use thin layer as directed x 3-5 days prn * Notice: This list has 2 medication(s) that are the same as other medications prescribed for you. Read the directions carefully, and ask your doctor or other care provider to review them with you. We Performed the Following AMB POC URINALYSIS DIP STICK AUTO W/O MICRO [74996 CPT(R)] REFERRAL TO UROLOGY [BZB397 Custom] Follow-up Instructions Return if symptoms worsen or fail to improve. To-Do List   
 05/21/2018 Imaging:  US ABD COMP Referral Information Referral ID Referred By Referred To  
  
 [id number] Krystin Silvestre [facility] [address] Visits Status Start Date End Date 1 New Request 5/21/18 5/21/19 If your referral has a status of pending review or denied, additional information will be sent to support the outcome of this decision. Patient Instructions Gas and Bloating: Care Instructions Your Care Instructions Gas and bloating can be uncomfortable and embarrassing problems. All people pass gas, but some people produce more gas than others, sometimes enough to cause distress. It is normal to pass gas from 6 to 20 times per day. Excess gas usually is not caused by a serious health problem. Gas and bloating usually are caused by something you eat or drink, including some food supplements and medicines. Gas and bloating are usually harmless and go away without treatment. However, changing your diet can help end the problem. Some over-the-counter medicines can help prevent gas and relieve bloating. Follow-up care is a key part of your treatment and safety. Be sure to make and go to all appointments, and call your doctor if you are having problems. It's also a good idea to know your test results and keep a list of the medicines you take. How can you care for yourself at home? · Keep a food diary if you think a food gives you gas. Write down what you eat or drink. Also record when you get gas. If you notice that a food seems to cause your gas each time, avoid it and see if the gas goes away. Examples of foods that cause gas include: ¨ Fried and fatty foods. ¨ Beans. ¨ Vegetables such as artichokes, asparagus, broccoli, brussels sprouts, cabbage, cauliflower, cucumbers, green peppers, onions, peas, radishes, and raw potatoes. ¨ Fruits such as apricots, bananas, melons, peaches, pears, prunes, and raw apples. ¨ Wheat and wheat bran. · Soak dry beans in water overnight, then dump the water and cook the soaked beans in new water. This can help prevent gas and bloating. · If you have problems with lactose, avoid dairy products such as milk and cheese. · Try not to swallow air. Do not drink through a straw, gulp your food, or chew gum. · Take an over-the-counter medicine. Read and follow all instructions on the label.  
¨ Food enzymes, such as Beano, can be added to gas-producing foods to prevent gas. ¨ Antacids, such as Maalox Anti-Gas and Mylanta Gas, can relieve bloating by making you burp. Be careful when you take over-the-counter antacid medicines. Many of these medicines have aspirin in them. Read the label to make sure that you are not taking more than the recommended dose. Too much aspirin can be harmful. ¨ Activated charcoal tablets, such as CharcoCaps, may decrease odor from gas you pass. ¨ If you have problems with lactose, you can take medicines such as Dairy Ease and Lactaid with dairy products to prevent gas and bloating. · Get some exercise regularly. When should you call for help? Call 911 anytime you think you may need emergency care. For example, call if: 
? · You have gas and signs of a heart attack, such as: ¨ Chest pain or pressure. ¨ Sweating. ¨ Shortness of breath. ¨ Nausea or vomiting. ¨ Pain that spreads from the chest to the neck, jaw, or one or both shoulders or arms. ¨ Dizziness or lightheadedness. ¨ A fast or uneven pulse. After calling 911, chew 1 adult-strength aspirin. Wait for an ambulance. Do not try to drive yourself. ?Call your doctor now or seek immediate medical care if: 
? · You have severe belly pain. ? · You have blood in your stool. ? Watch closely for changes in your health, and be sure to contact your doctor if: 
? · You have blood or pus in your urine. ? · Your urine is cloudy or smells bad.  
? · You are burping and have trouble swallowing. ? · You feel bloated and have swelling in your belly. ? · You do not get better as expected. Where can you learn more? Go to http://pricilla-rafa.info/. Enter E001 in the search box to learn more about \"Gas and Bloating: Care Instructions. \" Current as of: March 20, 2017 Content Version: 11.4 © 4217-4221 [employer].  Care instructions adapted under license by [employer] (which disclaims liability or warranty for this information). If you have questions about a medical condition or this instruction, always ask your healthcare professional. [address] any warranty or liability for your use of this information. Introducing [facility] SERVICES! Dear Jake Blevins: 
Thank you for requesting a SaferTaxi account. Our records indicate that you already have an active SaferTaxi account. You can access your account anytime at [email or web address] Did you know that you can access your hospital and ER discharge instructions at any time in SaferTaxi? You can also review all of your test results from your hospital stay or ER visit. Additional Information If you have questions, please visit the Frequently Asked Questions section of the SaferTaxi website at [email or web address] Remember, SaferTaxi is NOT to be used for urgent needs. For medical emergencies, dial 911. Now available from your iPhone and Android! Please provide this summary of care documentation to your next provider. Your primary care clinician is listed as [address] If you have any questions after today's visit, please call [phone].

## 2018-05-21 NOTE — PROGRESS NOTES
Exam Room 9  [facility] Adjutant is a 66 y.o. female  Chief Complaint   Patient presents with    Urinary Frequency    Enuresis     happening more frequently    Other     Per patient bump on vagina on left side     1. Have you been to the ER, urgent care clinic since your last visit? Hospitalized since your last visit? No    2. Have you seen or consulted any other health care providers outside of the [address] [place] [address] since your last visit? Include any pap smears or colon screening.  No

## 2018-05-25 ENCOUNTER — HOSPITAL ENCOUNTER (OUTPATIENT)
Dept: ULTRASOUND IMAGING | Age: 58
Discharge: HOME | End: 2018-05-25
Attending: NURSE PRACTITIONER
Payer: MEDICARE

## 2018-05-25 DIAGNOSIS — R14.0 ABDOMINAL BLOATING: ICD-10-CM

## 2018-05-25 PROCEDURE — 76700 US EXAM ABDOM COMPLETE: CPT

## 2018-07-16 ENCOUNTER — OFFICE VISIT (OUTPATIENT)
Dept: INTERNAL MEDICINE CLINIC | Age: 58
End: 2018-07-16

## 2018-07-16 VITALS
SYSTOLIC BLOOD PRESSURE: 105 MMHG | BODY MASS INDEX: 23.92 KG/M2 | OXYGEN SATURATION: 98 % | WEIGHT: 130 LBS | RESPIRATION RATE: 20 BRPM | HEART RATE: 110 BPM | TEMPERATURE: 97.8 F | HEIGHT: 62 IN | DIASTOLIC BLOOD PRESSURE: 74 MMHG

## 2018-07-16 DIAGNOSIS — F25.9 SCHIZOPHRENIA, SCHIZO-AFFECTIVE (HCC): ICD-10-CM

## 2018-07-16 DIAGNOSIS — K21.9 GASTROESOPHAGEAL REFLUX DISEASE WITHOUT ESOPHAGITIS: ICD-10-CM

## 2018-07-16 DIAGNOSIS — E78.00 HYPERCHOLESTEROLEMIA: ICD-10-CM

## 2018-07-16 DIAGNOSIS — J30.9 ALLERGIC RHINITIS, UNSPECIFIED SEASONALITY, UNSPECIFIED TRIGGER: ICD-10-CM

## 2018-07-16 DIAGNOSIS — R73.02 IGT (IMPAIRED GLUCOSE TOLERANCE): ICD-10-CM

## 2018-07-16 DIAGNOSIS — E55.9 VITAMIN D DEFICIENCY: ICD-10-CM

## 2018-07-16 DIAGNOSIS — E03.9 ACQUIRED HYPOTHYROIDISM: Primary | ICD-10-CM

## 2018-07-16 RX ORDER — FENOFIBRATE 160 MG/1
160 TABLET ORAL DAILY
Qty: 30 TAB | Refills: 5 | Status: SHIPPED | OUTPATIENT
Start: 2018-07-16 | End: 2019-01-18 | Stop reason: SDUPTHER

## 2018-07-16 RX ORDER — MEMANTINE HYDROCHLORIDE 5 MG/1
5 TABLET ORAL DAILY
Qty: 30 TAB | Refills: 5 | Status: SHIPPED | OUTPATIENT
Start: 2018-07-16 | End: 2019-01-22 | Stop reason: SDUPTHER

## 2018-07-16 NOTE — PROGRESS NOTES
Exam room 15  [facility] Suresh is a 66 y.o. female  Chief Complaint   Patient presents with    Hypertension    Cholesterol Problem    Thyroid Problem    Annual Wellness Visit     1. Have you been to the ER, urgent care clinic since your last visit? Hospitalized since your last visit? No    2. Have you seen or consulted any other health care providers outside of the [facility] since your last visit? Include any pap smears or colon screening.  No

## 2018-07-16 NOTE — PROGRESS NOTES
HPI:  Presents for f/u lipids, thyroid, etc    Pt reports N/V last night. No nausea today. No other known sick contacts. Pt reports blowing nose, but staff,  unable to confirm nasal sx   Living in a group home. Has seen [facility] - Dr. Ayaka Molina for urinary incontinence, urgency  Wearing undergarments. No infectious etiology. Rx'd Myrbetriq  Unclear benefit at this point. Psych agreed to stop the Haldol injections, seroquel dose reduced  Behavior improved. Much better cognitive function  Saw neuro - on namenda. Past medical, Social, and Family history reviewed    Prior to Admission medications    Medication Sig Start Date End Date Taking? Authorizing Provider   haloperidol (HALDOL) 5 mg tablet  5/1/18  Yes Historical Provider   QUEtiapine SR (SEROQUEL XR) 400 mg sr tablet  5/1/18  Yes Historical Provider   pravastatin (PRAVACHOL) 80 mg tablet Take 1 Tab by mouth nightly. 5/16/18  Yes Ibis Putnam MD   cholecalciferol (VITAMIN D3) 1,000 unit tablet Take 2 Tabs by mouth daily. 5/16/18  Yes Ibis Putnam MD   melatonin 3 mg tablet Take  by mouth. Yes Historical Provider   levothyroxine (SYNTHROID) 50 mcg tablet Take 1 Tab by mouth Daily (before breakfast). 3/21/18  Yes Ibis Putnam MD   fenofibrate (LOFIBRA) 160 mg tablet Take 1 Tab by mouth daily. 1/18/18  Yes Ibis Putnam MD   haloperidol (HALDOL) 10 mg tablet  12/1/17  Yes Historical Provider   pantoprazole (PROTONIX) 40 mg tablet Take 1 Tab by mouth daily. 9/21/17  Yes Ibis Putnam MD   triamcinolone acetonide (KENALOG) 0.1 % topical cream Apply  to affected area two (2) times a day. use thin layer as directed x 3-5 days prn 3/17/17  Yes Ibis Putnam MD   clotrimazole (LOTRIMIN) 1 % topical cream Apply  to affected area two (2) times a day. X 7-10 days 3/17/17  Yes Ibis Putnam MD   naproxen sodium (NAPROSYN) 220 mg tablet Take 220 mg by mouth two (2) times daily as needed.    Yes Historical Provider PARoxetine (PAXIL) 20 mg tablet Take  by mouth daily. Indications: should be 10 mg   Yes Historical Provider   ibuprofen (MOTRIN) 400 mg tablet Take 1 Tab by mouth every eight (8) hours as needed for Pain. 1/29/16  Yes Jesusita Ramires MD   benztropine (COGENTIN) 1 mg tablet Take  by mouth nightly. Yes Historical Provider   permethrin (ACTICIN) 5 % topical cream Apply  to affected area now. apply sparingly as directed   Yes Historical Provider   sennosides (PERDIEM OVERNIGHT RELIEF) 15 mg Tab Take  by mouth. Yes Historical Provider   divalproex ER (DEPAKOTE ER) 500 mg ER tablet Take 500 mg by mouth two (2) times a day. Yes Historical Provider   memantine (NAMENDA) 5 mg tablet Take 1 Tab by mouth daily. 3/12/18   Sundeep Devine,    OLANZapine (ZYPREXA) 5 mg tablet Take  by mouth nightly. Historical Provider   LORazepam (ATIVAN) 1 mg tablet Take 1 Tab by mouth daily as needed (procedural anxiolysis). 2/16/18   Jesusita Ramires MD   haloperidol decanoate (HALDOL DECANOATE) 100 mg/mL injection 200 mg by IntraMUSCular route every twenty-one (21) days. Historical Provider          ROS  Complete ROS reviewed and negative or stable except as noted in HPI. Physical Exam   Constitutional: She is oriented to person, place, and time. She appears well-nourished. No distress. HENT:   Head: Normocephalic and atraumatic. Eyes: EOM are normal. Pupils are equal, round, and reactive to light. No scleral icterus. Neck: Normal range of motion. Neck supple. No JVD present. Cardiovascular: Normal rate, regular rhythm and normal heart sounds. Exam reveals no gallop and no friction rub. No murmur heard. Pulmonary/Chest: Effort normal and breath sounds normal. No respiratory distress. She has no wheezes. She has no rales. Abdominal: Soft. She exhibits no distension. There is no tenderness. Musculoskeletal: Normal range of motion. She exhibits no edema.    Lymphadenopathy:     She has no cervical adenopathy. Neurological: She is alert and oriented to person, place, and time. She exhibits normal muscle tone. Skin: Skin is warm. No rash noted. Psychiatric:   Affect and interaction more c/w her prior baseline   Nursing note and vitals reviewed. Prior labs reviewed. Assessment/Plan:  ?namenda or other med exacerbating urinary issues  ?nasal drainage causing emesis    ICD-10-CM ICD-9-CM    1. Acquired hypothyroidism E03.9 244.9 T4, FREE      TSH 3RD GENERATION   2. Schizophrenia, schizo-affective ([facility] [address] F25.0 295.70    3. Gastroesophageal reflux disease without esophagitis K21.9 530.81 CBC WITH AUTOMATED DIFF   4. Allergic rhinitis, unspecified seasonality, unspecified trigger J30.9 477.9    5. Hypercholesterolemia E78.00 272.0 CK      LIPID PANEL      METABOLIC PANEL, COMPREHENSIVE   6. IGT (impaired glucose tolerance) R73.02 790.22 HEMOGLOBIN A1C WITH EAG   7. Vitamin D deficiency E55.9 268.9 VITAMIN D, 25 HYDROXY     Follow-up Disposition:  Return in about 2 months (around 9/16/2018), or if symptoms worsen or fail to improve, for urinary symptoms.   results and schedule of future studies reviewed with patient and   reviewed diet, exercise and weight  cardiovascular risk and specific lipid/LDL goals reviewed  reviewed medications and side effects in detail   Monitor urinary symptoms  Follow up with urology  See neuro - consider dc namenda since the change in psych meds improved cognitive and functional status  Fasting labs

## 2018-07-16 NOTE — TELEPHONE ENCOUNTER
Medication refill request:    Last Office Visit:7/16/18  Next Office Visit:  Future Appointments  Date Time Provider Parmjit Thompsoni   9/18/2018 11:00 AM Ceasar Deleon MD [facility] [id number] [facility] verified. Yes    Patient requesting 30 day supply.

## 2018-07-16 NOTE — MR AVS SNAPSHOT
[address] [facility] [address] [facility] [address] 
[phone] Patient: Gaurang Proctor MRN: [id number] [facility]:[birth year] Visit Information Date & Time Provider Department Dept. Phone Encounter #  
 7/16/2018  9:45 AM Candace Coles MD [facility] Pediatric[facility] and Internal Medicine [phone] [id number] Follow-up Instructions Return in about 2 months (around 9/16/2018), or if symptoms worsen or fail to improve, for urinary symptoms. Upcoming Health Maintenance Date Due  
 MEDICARE YEARLY EXAM 4/19/2018 Influenza Age 5 to Adult 8/1/2018 BREAST CANCER SCRN MAMMOGRAM 5/9/2019 COLONOSCOPY 6/27/2019 PAP AKA CERVICAL CYTOLOGY 4/28/2020 DTaP/Tdap/Td series (2 - Td) 11/18/2025 Allergies as of 7/16/2018  Review Complete On: 7/16/2018 By: Candace Coles MD  
  
 Severity Noted Reaction Type Reactions Bee Sting [Sting, Bee]  04/24/2013    Unknown (comments) Bees [Hymenoptera Allergenic Extract]  10/07/2015    Other (comments) Ragweed  04/24/2013    Unknown (comments) Current Immunizations  Reviewed on 7/16/2018 Name Date Influenza Vaccine 11/20/2017 TB Skin Test (PPD) Intradermal 5/7/2018 12:00 PM, 4/18/2017, 4/26/2016 11:00 AM  
 Tdap 11/18/2015 Reviewed by Candace Coles MD on 7/16/2018 at 11:09 AM  
You Were Diagnosed With   
  
 Codes Comments Acquired hypothyroidism    -  Primary ICD-10-CM: E03.9 ICD-9-CM: 244.9 [facility]     ICD-10-CM: F25.0 ICD-9-CM: 295.70 Gastroesophageal reflux disease without esophagitis     ICD-10-CM: K21.9 ICD-9-CM: 530.81 Allergic rhinitis, unspecified seasonality, unspecified trigger     ICD-10-CM: J30.9 ICD-9-CM: 477.9 Hypercholesterolemia     ICD-10-CM: E78.00 ICD-9-CM: 272.0 IGT (impaired glucose tolerance)     ICD-10-CM: R73.02 
ICD-9-CM: 790.22 Vitamin D deficiency     ICD-10-CM: E55.9 ICD-9-CM: 268.9 Vitals BP Pulse Temp Resp Height(growth percentile) Weight(growth percentile) 105/74 (BP 1 Location: Left arm, BP Patient Position: Sitting) (!) 110 97.8 °F (36.6 °C) (Oral) 20 5' 2\" (1.575 m) 130 lb (59 kg) SpO2 BMI OB Status Smoking Status 98% 23.78 kg/m2 Menopause Current Every Day Smoker Vitals History BMI and BSA Data Body Mass Index Body Surface Area 23.78 kg/m 2 1.61 m 2 Preferred Pharmacy Pharmacy Name Phone [facility], [place] [phone] Your Updated Medication List  
  
   
This list is accurate as of 7/16/18 11:15 AM.  Always use your most recent med list.  
  
  
  
  
 benztropine 1 mg tablet Commonly known as:  COGENTIN Take  by mouth nightly. cholecalciferol 1,000 unit tablet Commonly known as:  VITAMIN D3 Take 2 Tabs by mouth daily. clotrimazole 1 % topical cream  
Commonly known as:  Florance Kaska Apply  to affected area two (2) times a day. X 7-10 days DEPAKOTE  mg ER tablet Generic drug:  divalproex ER Take 500 mg by mouth two (2) times a day. fenofibrate 160 mg tablet Commonly known as:  LOFIBRA Take 1 Tab by mouth daily. * haloperidol 10 mg tablet Commonly known as:  HALDOL * haloperidol 5 mg tablet Commonly known as:  HALDOL  
  
 haloperidol decanoate 100 mg/mL injection Commonly known as:  HALDOL DECANOATE  
200 mg by IntraMUSCular route every twenty-one (21) days. ibuprofen 400 mg tablet Commonly known as:  MOTRIN Take 1 Tab by mouth every eight (8) hours as needed for Pain.  
  
 levothyroxine 50 mcg tablet Commonly known as:  SYNTHROID Take 1 Tab by mouth Daily (before breakfast). LORazepam 1 mg tablet Commonly known as:  ATIVAN Take 1 Tab by mouth daily as needed (procedural anxiolysis). melatonin 3 mg tablet Take  by mouth.  
  
 memantine 5 mg tablet Commonly known as:  John Jose Take 1 Tab by mouth daily. naproxen sodium 220 mg tablet Commonly known as:  NAPROSYN Take 220 mg by mouth two (2) times daily as needed. OLANZapine 5 mg tablet Commonly known as:  ZyPREXA Take  by mouth nightly. pantoprazole 40 mg tablet Commonly known as:  PROTONIX Take 1 Tab by mouth daily. PARoxetine 20 mg tablet Commonly known as:  PAXIL Take  by mouth daily. Indications: should be 10 mg PERDIEM OVERNIGHT RELIEF 15 mg tablet Generic drug:  sennosides Take  by mouth.  
  
 permethrin 5 % topical cream  
Commonly known as:  ACTICIN Apply  to affected area now. apply sparingly as directed  
  
 pravastatin 80 mg tablet Commonly known as:  PRAVACHOL Take 1 Tab by mouth nightly. QUEtiapine  mg sr tablet Commonly known as:  SEROquel XR  
  
 triamcinolone acetonide 0.1 % topical cream  
Commonly known as:  KENALOG Apply  to affected area two (2) times a day. use thin layer as directed x 3-5 days prn * Notice: This list has 2 medication(s) that are the same as other medications prescribed for you. Read the directions carefully, and ask your doctor or other care provider to review them with you. We Performed the Following CBC WITH AUTOMATED DIFF [25367 CPT(R)] CK [id number] CPT(R)] HEMOGLOBIN A1C WITH EAG [90566 CPT(R)] LIPID PANEL [91920 CPT(R)] METABOLIC PANEL, COMPREHENSIVE [80503 CPT(R)] T4, FREE [id number] CPT(R)] TSH 3RD GENERATION [69339 CPT(R)] VITAMIN D, 25 HYDROXY [id number] CPT(R)] Follow-up Instructions Return in about 2 months (around 9/16/2018), or if symptoms worsen or fail to improve, for urinary symptoms. Patient Instructions Ask pharmacy about new shingles vaccine - Shingrix Introducing [facility] [place] [facility]! Dear Erving Schilder: 
Thank you for requesting a [employer] account.   Our records indicate that you already have an active [employer] [facility] account. You can access your account anytime at [email or web address] Did you know that you can access your hospital and ER discharge instructions at any time in [employer] [facility]? You can also review all of your test results from your hospital stay or ER visit. Additional Information If you have questions, please visit the Frequently Asked Questions section of the [employer] website at [email or web address] Remember, [employer] [facility] is NOT to be used for urgent needs. For medical emergencies, dial 911. Now available from your iPhone and Android! Please provide this summary of care documentation to your next provider. Your primary care clinician is listed as [address] If you have any questions after today's visit, please call [phone].

## 2018-07-18 LAB
25(OH)D3+25(OH)D2 SERPL-MCNC: 34.3 NG/ML (ref 30–100)
ALBUMIN SERPL-MCNC: 4.2 G/DL (ref 3.5–5.5)
ALBUMIN/GLOB SERPL: 1.8 {RATIO} (ref 1.2–2.2)
ALP SERPL-CCNC: 73 IU/L (ref 39–117)
ALT SERPL-CCNC: 12 IU/L (ref 0–32)
AST SERPL-CCNC: 29 IU/L (ref 0–40)
BASOPHILS # BLD AUTO: 0 X10E3/UL (ref 0–0.2)
BASOPHILS NFR BLD AUTO: 0 %
BILIRUB SERPL-MCNC: 0.3 MG/DL (ref 0–1.2)
BUN SERPL-MCNC: 15 MG/DL (ref 6–24)
BUN/CREAT SERPL: 22 (ref 9–23)
CALCIUM SERPL-MCNC: 10.3 MG/DL (ref 8.7–10.2)
CHLORIDE SERPL-SCNC: 98 MMOL/L (ref 96–106)
CHOLEST SERPL-MCNC: 171 MG/DL (ref 100–199)
CK SERPL-CCNC: 47 U/L (ref 24–173)
CO2 SERPL-SCNC: 23 MMOL/L (ref 20–29)
CREAT SERPL-MCNC: 0.69 MG/DL (ref 0.57–1)
EOSINOPHIL # BLD AUTO: 0 X10E3/UL (ref 0–0.4)
EOSINOPHIL NFR BLD AUTO: 0 %
ERYTHROCYTE [DISTWIDTH] IN BLOOD BY AUTOMATED COUNT: 14.1 % (ref 12.3–15.4)
EST. AVERAGE GLUCOSE BLD GHB EST-MCNC: 108 MG/DL
GLOBULIN SER CALC-MCNC: 2.4 G/DL (ref 1.5–4.5)
GLUCOSE SERPL-MCNC: 79 MG/DL (ref 65–99)
HBA1C MFR BLD: 5.4 % (ref 4.8–5.6)
HCT VFR BLD AUTO: 39.4 % (ref 34–46.6)
HDLC SERPL-MCNC: 67 MG/DL
HGB BLD-MCNC: 13.3 G/DL (ref 11.1–15.9)
IMM GRANULOCYTES # BLD: 0 X10E3/UL (ref 0–0.1)
IMM GRANULOCYTES NFR BLD: 0 %
LDLC SERPL CALC-MCNC: 79 MG/DL (ref 0–99)
LYMPHOCYTES # BLD AUTO: 2.3 X10E3/UL (ref 0.7–3.1)
LYMPHOCYTES NFR BLD AUTO: 44 %
MCH RBC QN AUTO: 32.9 PG (ref 26.6–33)
MCHC RBC AUTO-ENTMCNC: 33.8 G/DL (ref 31.5–35.7)
MCV RBC AUTO: 98 FL (ref 79–97)
MONOCYTES # BLD AUTO: 0.5 X10E3/UL (ref 0.1–0.9)
MONOCYTES NFR BLD AUTO: 10 %
NEUTROPHILS # BLD AUTO: 2.4 X10E3/UL (ref 1.4–7)
NEUTROPHILS NFR BLD AUTO: 46 %
PLATELET # BLD AUTO: 265 X10E3/UL (ref 150–379)
POTASSIUM SERPL-SCNC: 5.1 MMOL/L (ref 3.5–5.2)
PROT SERPL-MCNC: 6.6 G/DL (ref 6–8.5)
RBC # BLD AUTO: 4.04 X10E6/UL (ref 3.77–5.28)
SODIUM SERPL-SCNC: 137 MMOL/L (ref 134–144)
T4 FREE SERPL-MCNC: 1.07 NG/DL (ref 0.82–1.77)
TRIGL SERPL-MCNC: 127 MG/DL (ref 0–149)
TSH SERPL DL<=0.005 MIU/L-ACNC: 1.55 UIU/ML (ref 0.45–4.5)
VLDLC SERPL CALC-MCNC: 25 MG/DL (ref 5–40)
WBC # BLD AUTO: 5.2 X10E3/UL (ref 3.4–10.8)

## 2018-09-18 ENCOUNTER — OFFICE VISIT (OUTPATIENT)
Dept: INTERNAL MEDICINE CLINIC | Age: 58
End: 2018-09-18

## 2018-09-18 VITALS
RESPIRATION RATE: 16 BRPM | OXYGEN SATURATION: 98 % | BODY MASS INDEX: 25.98 KG/M2 | HEIGHT: 62 IN | TEMPERATURE: 97.7 F | DIASTOLIC BLOOD PRESSURE: 78 MMHG | HEART RATE: 90 BPM | WEIGHT: 141.2 LBS | SYSTOLIC BLOOD PRESSURE: 114 MMHG

## 2018-09-18 DIAGNOSIS — E03.9 ACQUIRED HYPOTHYROIDISM: ICD-10-CM

## 2018-09-18 DIAGNOSIS — R13.10 DYSPHAGIA, UNSPECIFIED TYPE: Primary | ICD-10-CM

## 2018-09-18 DIAGNOSIS — K21.9 GASTROESOPHAGEAL REFLUX DISEASE WITHOUT ESOPHAGITIS: ICD-10-CM

## 2018-09-18 DIAGNOSIS — E78.00 HYPERCHOLESTEROLEMIA: ICD-10-CM

## 2018-09-18 DIAGNOSIS — F25.9 SCHIZOPHRENIA, SCHIZO-AFFECTIVE (HCC): ICD-10-CM

## 2018-09-18 DIAGNOSIS — R32 URINARY INCONTINENCE, UNSPECIFIED TYPE: ICD-10-CM

## 2018-09-18 DIAGNOSIS — E55.9 VITAMIN D DEFICIENCY: ICD-10-CM

## 2018-09-18 DIAGNOSIS — R73.02 IGT (IMPAIRED GLUCOSE TOLERANCE): ICD-10-CM

## 2018-09-18 DIAGNOSIS — J30.9 ALLERGIC RHINITIS, UNSPECIFIED SEASONALITY, UNSPECIFIED TRIGGER: ICD-10-CM

## 2018-09-18 DIAGNOSIS — Z23 ENCOUNTER FOR IMMUNIZATION: ICD-10-CM

## 2018-09-18 RX ORDER — QUETIAPINE 300 MG/1
300 TABLET, FILM COATED, EXTENDED RELEASE ORAL
COMMUNITY

## 2018-09-18 NOTE — PROGRESS NOTES
Rm 14    Chief Complaint   Patient presents with    Follow-up     urinary symptoms     1. Have you been to the ER, urgent care clinic since your last visit? Hospitalized since your last visit? No    2. Have you seen or consulted any other health care providers outside of the [address] [place] [address] since your last visit? Include any pap smears or colon screening.  No    Health Maintenance Due   Topic Date Due    MEDICARE YEARLY EXAM  04/19/2018    Influenza Age 5 to Adult  08/01/2018     PHQ over the last two weeks 7/16/2018   PHQ Not Done Active Diagnosis of Depression or Bipolar Disorder   Little interest or pleasure in doing things -   Feeling down, depressed, irritable, or hopeless -   Total Score PHQ 2 -     Learning Assessment 4/5/2018   PRIMARY LEARNER Patient   HIGHEST LEVEL OF EDUCATION - PRIMARY LEARNER  GRADUATED HIGH SCHOOL OR GED   BARRIERS PRIMARY LEARNER COGNITIVE   CO-LEARNER CAREGIVER Yes   CO-LEARNER NAME caregiver   CO-LEARNER HIGHEST LEVEL OF EDUCATION 4 YEARS OF COLLEGE   BARRIERS CO-LEARNER NONE   PRIMARY LANGUAGE ENGLISH   PRIMARY LANGUAGE CO-LEARNER ENGLISH    NEED No   LEARNER PREFERENCE PRIMARY READING   LEARNER PREFERENCE CO-LEARNER READING   LEARNING SPECIAL TOPICS none   ANSWERED BY patient   RELATIONSHIP SELF

## 2018-09-18 NOTE — MR AVS SNAPSHOT
[address] [place] [address] [address] [address] 
[phone] Patient: Wilver Blanchard MRN: [id number] Visit Information Date & Time Provider Department Dept. Phone Encounter #  
 2018 11:00 AM Vanessa Guerrero, [address] [place] [address] and Internal Medicine [phone] [id number] Follow-up Instructions Return in about 6 weeks (around 10/30/2018), or if symptoms worsen or fail to improve, for results and specialist follow up. Upcoming Health Maintenance Date Due  
 MEDICARE YEARLY EXAM 2018 Influenza Age 5 to Adult 2018 BREAST CANCER SCRN MAMMOGRAM 2019 COLONOSCOPY 2019 PAP AKA CERVICAL CYTOLOGY 2020 DTaP/Tdap/Td series (2 - Td) 2025 Allergies as of 2018  Review Complete On: 2018 By: Vanessa Guerrero MD  
  
 Severity Noted Reaction Type Reactions Bee Sting [Sting, Bee]  2013    Unknown (comments) Bees [Hymenoptera Allergenic Extract]  10/07/2015    Other (comments) Ragweed  2013    Unknown (comments) Current Immunizations  Reviewed on 2018 Name Date Influenza Vaccine 2017 Influenza Vaccine (Quad) PF  Incomplete TB Skin Test (PPD) Intradermal 2018 12:00 PM, 2017, 2016 11:00 AM  
 Tdap 2015 Reviewed by Vanessa Guerrero MD on 2018 at 11:50 AM  
You Were Diagnosed With   
  
 Codes Comments Dysphagia, unspecified type    -  Primary ICD-10-CM: R13.10 ICD-9-CM: 787.20 Encounter for immunization     ICD-10-CM: B57 ICD-9-CM: V03.89 Gastroesophageal reflux disease without esophagitis     ICD-10-CM: K21.9 ICD-9-CM: 530.81 Schizophrenia, [facility]affective [facility]     ICD-10-CM: F25.0 ICD-9-CM: 295.70 Acquired hypothyroidism     ICD-10-CM: E03.9 ICD-9-CM: 244.9  IGT (impaired glucose tolerance)     ICD-10-CM: R73.02 
ICD-9-CM: 790.22   
 Hypercholesterolemia     ICD-10-CM: E78.00 ICD-9-CM: 272.0 Vitamin D deficiency     ICD-10-CM: E55.9 ICD-9-CM: 268.9 Allergic rhinitis, unspecified seasonality, unspecified trigger     ICD-10-CM: J30.9 ICD-9-CM: 477.9 Urinary incontinence, unspecified type     ICD-10-CM: R32 
ICD-9-CM: 788.30 Vitals BP Pulse Temp Resp Height(growth percentile) Weight(growth percentile) 114/78 (BP 1 Location: Left arm, BP Patient Position: Sitting) 90 97.7 °F (36.5 °C) (Oral) 16 5' 2\" (1.575 m) 141 lb 3.2 oz (64 kg) SpO2 BMI OB Status Smoking Status 98% 25.83 kg/m2 Menopause Current Every Day Smoker Vitals History BMI and BSA Data Body Mass Index Body Surface Area  
 25.83 kg/m 2 1.67 m 2 Preferred Pharmacy Pharmacy Name Phone [facility] [address], [address] [phone] Your Updated Medication List  
  
   
This list is accurate as of 9/18/18 12:01 PM.  Always use your most recent med list.  
  
  
  
  
 cholecalciferol 1,000 unit tablet Commonly known as:  VITAMIN D3 Take 2 Tabs by mouth daily. clotrimazole 1 % topical cream  
Commonly known as:  Lino Singsarah Apply  to affected area two (2) times a day. X 7-10 days DEPAKOTE  mg ER tablet Generic drug:  divalproex ER Take 500 mg by mouth two (2) times a day. fenofibrate 160 mg tablet Commonly known as:  LOFIBRA Take 1 Tab by mouth daily. * haloperidol 10 mg tablet Commonly known as:  HALDOL * haloperidol 5 mg tablet Commonly known as:  HALDOL  
  
 ibuprofen 400 mg tablet Commonly known as:  MOTRIN Take 1 Tab by mouth every eight (8) hours as needed for Pain.  
  
 levothyroxine 50 mcg tablet Commonly known as:  SYNTHROID Take 1 Tab by mouth Daily (before breakfast). LORazepam 1 mg tablet Commonly known as:  ATIVAN Take 1 Tab by mouth daily as needed (procedural anxiolysis). melatonin 3 mg tablet Take  by mouth.  
  
 memantine 5 mg tablet Commonly known as:  Von Borrow Take 1 Tab by mouth daily. naproxen sodium 220 mg tablet Commonly known as:  NAPROSYN Take 220 mg by mouth two (2) times daily as needed. OLANZapine 5 mg tablet Commonly known as:  ZyPREXA Take  by mouth nightly. pantoprazole 40 mg tablet Commonly known as:  PROTONIX Take 1 Tab by mouth daily. PARoxetine 20 mg tablet Commonly known as:  PAXIL Take  by mouth daily. Indications: should be 10 mg PERDIEM OVERNIGHT RELIEF 15 mg tablet Generic drug:  sennosides Take  by mouth.  
  
 permethrin 5 % topical cream  
Commonly known as:  ACTICIN Apply  to affected area now. apply sparingly as directed  
  
 pravastatin 80 mg tablet Commonly known as:  PRAVACHOL Take 1 Tab by mouth nightly. SEROquel  mg sr tablet Generic drug:  QUEtiapine SR Take 300 mg by mouth nightly. triamcinolone acetonide 0.1 % topical cream  
Commonly known as:  KENALOG Apply  to affected area two (2) times a day. use thin layer as directed x 3-5 days prn * Notice: This list has 2 medication(s) that are the same as other medications prescribed for you. Read the directions carefully, and ask your doctor or other care provider to review them with you. We Performed the Following ADMIN INFLUENZA VIRUS VAC [ [facility]] AMB POC URINALYSIS DIP STICK AUTO W/O MICRO [48715 CPT(R)] CULTURE, URINE [id number] CPT(R)] INFLUENZA VIRUS VAC QUAD,SPLIT,PRESV FREE SYRINGE IM [id number] CPT(R)] REFERRAL TO GASTROENTEROLOGY [SYO58 Custom] Comments: Dysphagia URINALYSIS W/ RFLX MICROSCOPIC [85548 CPT(R)] Follow-up Instructions Return in about 6 weeks (around 10/30/2018), or if symptoms worsen or fail to improve, for results and specialist follow up. To-Do List   
 09/25/2018 Imaging:  XR SWALLOW FUNC VIDEO Referral Information Referral ID Referred By Referred To  
  
 [id number] [facility] [address] [facility] [phone] [place], [address] Visits Status Start Date End Date 1 New Request 9/18/18 9/18/19 If your referral has a status of pending review or denied, additional information will be sent to support the outcome of this decision. Introducing hospitals & HEALTH SERVICES! Dear Daylin Moran: 
Thank you for requesting a [employer] account. Our records indicate that you already have an active [employer] account. You can access your account anytime at [email or web address] Did you know that you can access your hospital and ER discharge instructions at any time in Zocere? You can also review all of your test results from your hospital stay or ER visit. Additional Information If you have questions, please visit the Frequently Asked Questions section of the Zo[employer] website at [email or web address] Remember, Zocere is NOT to be used for urgent needs. For medical emergencies, dial 911. Now available from your iPhone and Android! Please provide this summary of care documentation to your next provider. Your primary care clinician is listed as [address] If you have any questions after today's visit, please call [phone].

## 2018-09-18 NOTE — PROGRESS NOTES
HPI:  Presents for f/u urinary sx    Accompanied by     Pt more confused in recent weeks in the AM  Some slurred speech   Resolves by mod morning    seroquel had been reduced to 300mg qhs from 400mg qhs    Trouble swallowing  Some regurgitation  Pt reports it as excess saliva    Saw urology and myrbetriq seems to be helping the incontinence  Reduced episodes of incontinence. Attempted urodynamic testing but pt did not tolerate or allow. Past medical, Social, and Family history reviewed    Prior to Admission medications    Medication Sig Start Date End Date Taking? Authorizing Provider   memantine (NAMENDA) 5 mg tablet Take 1 Tab by mouth daily. 7/16/18  Yes Halley Vazquez MD   fenofibrate (LOFIBRA) 160 mg tablet Take 1 Tab by mouth daily. 7/16/18  Yes Halley Vazquez MD   haloperidol (HALDOL) 5 mg tablet  5/1/18  Yes Historical Provider   QUEtiapine SR (SEROQUEL XR) 400 mg sr tablet  5/1/18  Yes Historical Provider   pravastatin (PRAVACHOL) 80 mg tablet Take 1 Tab by mouth nightly. 5/16/18  Yes Halley Vazquez MD   cholecalciferol (VITAMIN D3) 1,000 unit tablet Take 2 Tabs by mouth daily. 5/16/18  Yes Halley Vazquez MD   melatonin 3 mg tablet Take  by mouth. Yes Historical Provider   levothyroxine (SYNTHROID) 50 mcg tablet Take 1 Tab by mouth Daily (before breakfast). 3/21/18  Yes Halley Vazquez MD   OLANZapine (ZYPREXA) 5 mg tablet Take  by mouth nightly. Yes Historical Provider   LORazepam (ATIVAN) 1 mg tablet Take 1 Tab by mouth daily as needed (procedural anxiolysis). 2/16/18  Yes Halley Vazquez MD   haloperidol (HALDOL) 10 mg tablet  12/1/17  Yes Historical Provider   pantoprazole (PROTONIX) 40 mg tablet Take 1 Tab by mouth daily. 9/21/17  Yes Halley Vazquez MD   triamcinolone acetonide (KENALOG) 0.1 % topical cream Apply  to affected area two (2) times a day.  use thin layer as directed x 3-5 days prn 3/17/17  Yes Halley Vazquez MD   clotrimazole (LOTRIMIN) 1 % topical cream Apply  to affected area two (2) times a day. X 7-10 days 3/17/17  Yes Claudia Elias MD   PARoxetine (PAXIL) 20 mg tablet Take  by mouth daily. Indications: should be 10 mg   Yes Historical Provider   ibuprofen (MOTRIN) 400 mg tablet Take 1 Tab by mouth every eight (8) hours as needed for Pain. 1/29/16  Yes Claudia Elias MD   permethrin (ACTICIN) 5 % topical cream Apply  to affected area now. apply sparingly as directed   Yes Historical Provider   sennosides (PERDIEM OVERNIGHT RELIEF) 15 mg Tab Take  by mouth. Yes Historical Provider   divalproex ER (DEPAKOTE ER) 500 mg ER tablet Take 500 mg by mouth two (2) times a day. Yes Historical Provider   naproxen sodium (NAPROSYN) 220 mg tablet Take 220 mg by mouth two (2) times daily as needed. Historical Provider   benztropine (COGENTIN) 1 mg tablet Take  by mouth nightly. Historical Provider   haloperidol decanoate (HALDOL DECANOATE) 100 mg/mL injection 200 mg by IntraMUSCular route every twenty-one (21) days. Historical Provider          ROS  Complete ROS reviewed and negative or stable except as noted in HPI. Physical Exam   Constitutional: She is oriented to person, place, and time. She appears well-nourished. No distress. HENT:   Head: Normocephalic and atraumatic. Eyes: EOM are normal. Pupils are equal, round, and reactive to light. No scleral icterus. Neck: Normal range of motion. Neck supple. No JVD present. Cardiovascular: Normal rate, regular rhythm and normal heart sounds. Exam reveals no gallop and no friction rub. No murmur heard. Pulmonary/Chest: Effort normal and breath sounds normal. No respiratory distress. She has no wheezes. She has no rales. Abdominal: Soft. She exhibits no distension. There is no tenderness. Musculoskeletal: Normal range of motion. She exhibits no edema. Lymphadenopathy:     She has no cervical adenopathy. Neurological: She is alert and oriented to person, place, and time. She exhibits normal muscle tone. Skin: Skin is warm. No rash noted. Psychiatric:   Affect and interaction more c/w her prior baseline   Nursing note and vitals reviewed. Prior labs reviewed. Assessment/Plan:  Favor med induced AM sedation. ICD-10-CM ICD-9-CM    1. Dysphagia, unspecified type R13.10 787.20 REFERRAL TO GASTROENTEROLOGY      XR SWALLOW FUNC VIDEO   2. Encounter for immunization Z23 V03.89 INFLUENZA VIRUS VAC QUAD,SPLIT,PRESV FREE SYRINGE IM      ADMIN INFLUENZA VIRUS VAC   3. Gastroesophageal reflux disease without esophagitis K21.9 530.81 REFERRAL TO GASTROENTEROLOGY   4. Schizophrenia, schizo-affective ([facility] [address] F25.0 295.70    5. Acquired hypothyroidism E03.9 244.9    6. IGT (impaired glucose tolerance) R73.02 790.22    7. Hypercholesterolemia E78.00 272.0    8. Vitamin D deficiency E55.9 268.9    9. Allergic rhinitis, unspecified seasonality, unspecified trigger J30.9 477.9    10. Urinary incontinence, unspecified type R32 788.30 CULTURE, URINE      URINALYSIS W/ RFLX MICROSCOPIC      AMB POC URINALYSIS DIP STICK AUTO W/O MICRO     Follow-up Disposition:  Return in about 6 weeks (around 10/30/2018), or if symptoms worsen or fail to improve, for results and specialist follow up.    results and schedule of future studies reviewed with patient  reviewed diet, exercise and weight   cardiovascular risk and specific lipid/LDL goals reviewed  reviewed medications and side effects in detail   Check urine  Flu shot  Order MBS  Ref to GI  Continue bladder agent   to review med dosing with psych - Dr. Arian Arellano - to see pt next week  Encouraged neuro eval re: need for namenda

## 2018-09-20 LAB
APPEARANCE UR: CLEAR
BACTERIA UR CULT: NORMAL
BILIRUB UR QL STRIP: NEGATIVE
COLOR UR: YELLOW
GLUCOSE UR QL: NEGATIVE
HGB UR QL STRIP: NEGATIVE
KETONES UR QL STRIP: NEGATIVE
LEUKOCYTE ESTERASE UR QL STRIP: NEGATIVE
MICRO URNS: NORMAL
NITRITE UR QL STRIP: NEGATIVE
PH UR STRIP: 7 [PH] (ref 5–7.5)
PROT UR QL STRIP: NEGATIVE
SP GR UR: 1.01 (ref 1–1.03)
UROBILINOGEN UR STRIP-MCNC: 1 MG/DL (ref 0.2–1)

## 2018-09-25 DIAGNOSIS — E03.4 HYPOTHYROIDISM DUE TO ACQUIRED ATROPHY OF THYROID: ICD-10-CM

## 2018-09-25 RX ORDER — LEVOTHYROXINE SODIUM 50 UG/1
50 TABLET ORAL
Qty: 30 TAB | Refills: 5 | Status: SHIPPED | OUTPATIENT
Start: 2018-09-25 | End: 2019-03-21 | Stop reason: SDUPTHER

## 2018-09-25 RX ORDER — PANTOPRAZOLE SODIUM 40 MG/1
40 TABLET, DELAYED RELEASE ORAL DAILY
Qty: 30 TAB | Refills: 11 | Status: SHIPPED | OUTPATIENT
Start: 2018-09-25 | End: 2019-09-12 | Stop reason: SDUPTHER

## 2018-09-25 NOTE — TELEPHONE ENCOUNTER
Medication refill request:    Last Office Visit 9/18/18  Next Office Visit:  Future Appointments  Date Time Provider Parmjit Thompsoni   10/31/2018 11:00 AM Ibis Putnam MD [facility] [address] [facility] verified.   Yes

## 2018-10-31 ENCOUNTER — OFFICE VISIT (OUTPATIENT)
Dept: INTERNAL MEDICINE CLINIC | Age: 58
End: 2018-10-31

## 2018-10-31 VITALS
HEIGHT: 62 IN | RESPIRATION RATE: 14 BRPM | DIASTOLIC BLOOD PRESSURE: 76 MMHG | OXYGEN SATURATION: 96 % | TEMPERATURE: 97.8 F | SYSTOLIC BLOOD PRESSURE: 104 MMHG | WEIGHT: 145 LBS | HEART RATE: 92 BPM | BODY MASS INDEX: 26.68 KG/M2

## 2018-10-31 DIAGNOSIS — F25.9 SCHIZOPHRENIA, SCHIZO-AFFECTIVE (HCC): ICD-10-CM

## 2018-10-31 DIAGNOSIS — R32 URINARY INCONTINENCE, UNSPECIFIED TYPE: ICD-10-CM

## 2018-10-31 DIAGNOSIS — R73.02 IGT (IMPAIRED GLUCOSE TOLERANCE): ICD-10-CM

## 2018-10-31 DIAGNOSIS — R13.10 DYSPHAGIA, UNSPECIFIED TYPE: Primary | ICD-10-CM

## 2018-10-31 DIAGNOSIS — Z12.31 ENCOUNTER FOR SCREENING MAMMOGRAM FOR BREAST CANCER: ICD-10-CM

## 2018-10-31 DIAGNOSIS — E55.9 VITAMIN D DEFICIENCY: ICD-10-CM

## 2018-10-31 DIAGNOSIS — E78.00 HYPERCHOLESTEROLEMIA: ICD-10-CM

## 2018-10-31 DIAGNOSIS — E03.9 ACQUIRED HYPOTHYROIDISM: ICD-10-CM

## 2018-10-31 DIAGNOSIS — K21.9 GASTROESOPHAGEAL REFLUX DISEASE WITHOUT ESOPHAGITIS: ICD-10-CM

## 2018-10-31 DIAGNOSIS — J30.9 ALLERGIC RHINITIS, UNSPECIFIED SEASONALITY, UNSPECIFIED TRIGGER: ICD-10-CM

## 2018-10-31 NOTE — PROGRESS NOTES
HPI:  Presents for f/u dysphagia, etc    Swallow study now scheduled for 11/16/18    Saw GI - rec's awaiting MBS   Holding on EGD for now    Pt taking PPI    myrbetriq helping, but not without some incontinence episodes    seroquel changed to IR instead of XR and AM confusion improved. Past medical, Social, and Family history reviewed    Prior to Admission medications    Medication Sig Start Date End Date Taking? Authorizing Provider   mirabegron ER (MYRBETRIQ) 25 mg ER tablet Take 25 mg by mouth daily. Yes Provider, Historical   levothyroxine (SYNTHROID) 50 mcg tablet Take 1 Tab by mouth Daily (before breakfast). 9/25/18  Yes Kyra Allen MD   pantoprazole (PROTONIX) 40 mg tablet Take 1 Tab by mouth daily. 9/25/18  Yes Kyra Allen MD   QUEtiapine SR (SEROQUEL XR) 300 mg sr tablet Take 300 mg by mouth nightly. Yes Provider, Historical   memantine (NAMENDA) 5 mg tablet Take 1 Tab by mouth daily. 7/16/18  Yes Kyra Allen MD   fenofibrate (LOFIBRA) 160 mg tablet Take 1 Tab by mouth daily. 7/16/18  Yes Kyra Allen MD   haloperidol (HALDOL) 5 mg tablet  5/1/18  Yes Provider, Historical   pravastatin (PRAVACHOL) 80 mg tablet Take 1 Tab by mouth nightly. 5/16/18  Yes Kyra Allen MD   cholecalciferol (VITAMIN D3) 1,000 unit tablet Take 2 Tabs by mouth daily. 5/16/18  Yes Kyra Allen MD   OLANZapine (ZYPREXA) 5 mg tablet Take  by mouth nightly. Yes Provider, Historical   LORazepam (ATIVAN) 1 mg tablet Take 1 Tab by mouth daily as needed (procedural anxiolysis). 2/16/18  Yes Kyra Allen MD   haloperidol (HALDOL) 10 mg tablet  12/1/17  Yes Provider, Historical   clotrimazole (LOTRIMIN) 1 % topical cream Apply  to affected area two (2) times a day. X 7-10 days 3/17/17  Yes Kyra Allen MD   PARoxetine (PAXIL) 20 mg tablet Take  by mouth daily.  Indications: should be 10 mg   Yes Provider, Historical   ibuprofen (MOTRIN) 400 mg tablet Take 1 Tab by mouth every eight (8) hours as needed for Pain. 1/29/16  Yes Jerry Verdugo MD   permethrin (ACTICIN) 5 % topical cream Apply  to affected area now. apply sparingly as directed   Yes Provider, Historical   divalproex ER (DEPAKOTE ER) 500 mg ER tablet Take 500 mg by mouth two (2) times a day. Yes Provider, Historical   melatonin 3 mg tablet Take  by mouth. Provider, Historical   triamcinolone acetonide (KENALOG) 0.1 % topical cream Apply  to affected area two (2) times a day. use thin layer as directed x 3-5 days prn 3/17/17   Jerry Verdugo MD   naproxen sodium (NAPROSYN) 220 mg tablet Take 220 mg by mouth two (2) times daily as needed. Provider, Historical   sennosides (PERDIEM OVERNIGHT RELIEF) 15 mg Tab Take  by mouth. Provider, Historical          ROS  Complete ROS reviewed and negative or stable except as noted in HPI. Physical Exam   Constitutional: She is oriented to person, place, and time. She appears well-nourished. No distress. HENT:   Head: Normocephalic and atraumatic. Eyes: EOM are normal. Pupils are equal, round, and reactive to light. No scleral icterus. Neck: Normal range of motion. Neck supple. No JVD present. Cardiovascular: Normal rate, regular rhythm and normal heart sounds. Exam reveals no gallop and no friction rub. No murmur heard. Pulmonary/Chest: Effort normal and breath sounds normal. No respiratory distress. She has no wheezes. She has no rales. Abdominal: Soft. She exhibits no distension. There is no tenderness. Musculoskeletal: Normal range of motion. She exhibits no edema. Lymphadenopathy:     She has no cervical adenopathy. Neurological: She is alert and oriented to person, place, and time. She exhibits normal muscle tone. Skin: Skin is warm. No rash noted. Psychiatric:   At baseline   Nursing note and vitals reviewed. Prior labs reviewed. Assessment/Plan:    ICD-10-CM ICD-9-CM    1. Dysphagia, unspecified type R13.10 787.20    2.  Encounter for screening mammogram for breast cancer Z12.31 V76.12 ERIC MAMMO BI SCREENING INCL CAD   3. IGT (impaired glucose tolerance) R73.02 790.22    4. Vitamin D deficiency E55.9 268.9    5. Schizophrenia, schizo-affective ([facility] [address] F25.0 295.70    6. Acquired hypothyroidism E03.9 244.9    7. Hypercholesterolemia E78.00 272.0    8. Gastroesophageal reflux disease without esophagitis K21.9 530.81    9. Allergic rhinitis, unspecified seasonality, unspecified trigger J30.9 477.9    10. Urinary incontinence, unspecified type R32 788.30      Follow-up Disposition:  Return in about 3 months (around 1/31/2019), or if symptoms worsen or fail to improve, for thyroid, cholesterol.    results and schedule of future studies reviewed with patient  reviewed diet, exercise and weight    reviewed medications and side effects in detail   Pt may have had shingrix #1 at pharmacy -  to clarify date  Continue current medications   MBS as scheduled  See GI as scheduled  See neuro as scheduled  Mammogram ordered

## 2018-10-31 NOTE — PROGRESS NOTES
Rm#[id number]  Presents w/      Chief Complaint   Patient presents with    Other     referral and testing f/u; hasnt had stephenie fernandez, scheduled next month      1. Have you been to the ER, urgent care clinic since your last visit? Hospitalized since your last visit? No    2. Have you seen or consulted any other health care providers outside of the [address] [place] [address] since your last visit? Include any pap smears or colon screening.  Yes Dr. Roseline Carrillo, GI, 10-24-18  Health Maintenance Due   Topic Date Due    Shingrix Vaccine Age 50> (1 of 2) 03/03/2012    MEDICARE YEARLY EXAM  04/19/2018     PHQ over the last two weeks 7/16/2018   PHQ Not Done Active Diagnosis of Depression or Bipolar Disorder   Little interest or pleasure in doing things -   Feeling down, depressed, irritable, or hopeless -   Total Score PHQ 2 -

## 2018-11-16 ENCOUNTER — HOSPITAL ENCOUNTER (OUTPATIENT)
Dept: GENERAL RADIOLOGY | Age: 58
Discharge: HOME | End: 2018-11-16
Attending: SPECIALIST
Payer: MEDICARE

## 2018-11-16 DIAGNOSIS — K21.9 GASTROESOPHAGEAL REFLUX DISEASE: ICD-10-CM

## 2018-11-16 DIAGNOSIS — F20.9 SCHIZOPHRENIA (HCC): ICD-10-CM

## 2018-11-16 DIAGNOSIS — R13.10 DYSPHAGIA: ICD-10-CM

## 2018-11-16 PROCEDURE — G8997 SWALLOW GOAL STATUS: HCPCS | Performed by: SPEECH-LANGUAGE PATHOLOGIST

## 2018-11-16 PROCEDURE — 92611 MOTION FLUOROSCOPY/SWALLOW: CPT | Performed by: SPEECH-LANGUAGE PATHOLOGIST

## 2018-11-16 PROCEDURE — 74230 X-RAY XM SWLNG FUNCJ C+: CPT

## 2018-11-16 PROCEDURE — G8998 SWALLOW D/C STATUS: HCPCS | Performed by: SPEECH-LANGUAGE PATHOLOGIST

## 2018-11-16 PROCEDURE — G8996 SWALLOW CURRENT STATUS: HCPCS | Performed by: SPEECH-LANGUAGE PATHOLOGIST

## 2018-11-16 NOTE — PROGRESS NOTES
Stephen Rosales  [address], [place]  [address]    Speech Pathology Modified barium swallow Study with cms g codes  Patient: Wade Oviedo (89 y.o. female)  Date: 11/16/2018  Referring Provider:  Dr. Denny Anger:   Patient alert, cooperative and pleasantly confused, singing songs and wandering around the room prior to the study. Lives in a group home. Caregiver present and reports she has been complaining of choking with eating recently. GI considering EGD if MBS negative per chart review. OBJECTIVE:   Past Medical History:   Past Medical History:   Diagnosis Date    Allergic rhinitis     Brain atrophy 02/2018    Colon polyps     GERD (gastroesophageal reflux disease)     H/O colonoscopy     History of Papanicolaou smear of cervix 2014    Hypercholesterolemia     hypercholesterolemia    Hypothyroidism     IGT (impaired glucose tolerance)     Kidney tumor (benign)     removed in childhood     Other ill-defined conditions(799.89)     right shoulder bursitis    Psychiatric disorder     schizophrenia    Schizophrenia, schizo-affective ([facility] [address]     Thyroid disease      Past Surgical History:   Procedure Laterality Date    HX UROLOGICAL      tumor removal as child kidney     Current Dietary Status:  regular  Radiologist: Dr. Devlin Shear: Lateral;Fluoro  Patient Position: upright in hausted chair     Trial 1:   Consistency Presented: Thin liquid;Puree; Solid   How Presented: Self-fed/presented;Cup/sip;Cup/gulp;Straw;Successive swallows;Spoon   Consistency Amount: (300 thin Ba, 1 tbsp Ba paste )   Bolus Acceptance: No impairment   Bolus Formation/Control: No impairment:     Propulsion: No impairment   Oral Residue: None   Initiation of Swallow: No impairment   Timing: No impairment   Penetration: None   Aspiration/Timing: No evidence of aspiration   Pharyngeal Clearance: No residue           Decreased Tongue Base Retraction?: No  Laryngeal Elevation: [facility] (within functional limits)  Aspiration/Penetration Score: 1 (No penetration or aspiration-Contrast does not enter the airway)  Pharyngeal Symmetry: Not assessed  Pharyngeal-Esophageal Segment: No impairment  Pharyngeal Dysfunction: None    Oral Phase Severity: No impairment  Pharyngeal Phase Severity: N/A    In compliance with CMSs Claims Based Outcome Reporting, the following G-code set was chosen for this patient based the use of the NOMS functional outcome to quantify this patient's level of swallowing impairment. Using the NOMS, the patient was determined to be at level 7 for swallow function which correlates with the CH= 0% level of severity. Based on the objective assessment provided within this note, the current, goal, and discharge g-codes are as follows:    Swallow  Swallowing:   Swallow Current Status CH= 0%   Swallow Goal Status CH= 0%   Swallow D/C Status CH= 0%      NOMS Swallowing Levels:  Level 1 (CN): NPO  Level 2 (CM): NPO but takes consistency in therapy  Level 3 (CL): Takes less than 50% of nutrition p.o. and continues with nonoral feedings; and/or safe with mod cues; and/or max diet restriction  Level 4 (CK): Safe swallow but needs mod cues; and/or mod diet restriction; and/or still requires some nonoral feeding/supplements  Level 5 (CJ): Safe swallow with min diet restriction; and/or needs min cues  Level 6 (CI): Independent with p.o.; rare cues; usually self cues; may need to avoid some foods or needs extra time  Level 7 ([address] [place] [address]): Independent for all p.o.  DUYEN. (2003). National Outcomes Measurement System (NOMS): Adult Speech-Language Pathology User's Guide. ASSESSMENT :  Based on the objective data described above, the patient presents with no oral or pharyngeal dysphagia with timely and complete mastication, timely swallow initiation and functional hyolaryngeal elevation/excursion. No penetration, aspiration or pharyngeal residue.   Of note, prominent cricopharyngeus, though it did not impede bolus flow. PLAN/RECOMMENDATIONS :  Continue regular diet. Follow up with GI regarding further workup     COMMUNICATION/EDUCATION:   The above findings and recommendations were discussed with: patient and caregivers  who verbalized understanding. Thank you for this referral.  David Jensen M.CD.  CCC-SLP   Time Calculation: 15 mins

## 2018-11-23 DIAGNOSIS — E78.00 HYPERCHOLESTEROLEMIA: ICD-10-CM

## 2018-11-23 NOTE — TELEPHONE ENCOUNTER
Medication refill request:    Last Office Visit:10/31/18  Next Office Visit:    Future Appointments   Date Time Provider Parmjit Contreras   1/22/2019 10:40 AM DO ALEX Sahni   1/29/2019  9:30 AM Arielle Vale MD [facility] [address] [facility] verified. Yes    [facility] is requesting a 30 day supply of the pt's Pravastatin 80 mg.     [facility]'s # [phone]  Fax # [phone]

## 2018-11-24 RX ORDER — PRAVASTATIN SODIUM 80 MG/1
80 TABLET ORAL
Qty: 90 TAB | Refills: 1 | Status: SHIPPED | OUTPATIENT
Start: 2018-11-24 | End: 2019-01-18 | Stop reason: SDUPTHER

## 2018-12-17 ENCOUNTER — TELEPHONE (OUTPATIENT)
Dept: INTERNAL MEDICINE CLINIC | Age: 58
End: 2018-12-17

## 2018-12-17 NOTE — TELEPHONE ENCOUNTER
Paco Hernandez is requesting for provider to place orders for pt to receive under garments through insurance. Please advise, thanks.

## 2018-12-17 NOTE — TELEPHONE ENCOUNTER
Dr. Ismael Munoz   Received: Today   Message Contents   [address] Office Pool             Debbie (Group Supervisor) would like to know if  can send prescription (Depends Undergarments) through pt insurance.  Best contact [phone]

## 2018-12-17 NOTE — TELEPHONE ENCOUNTER
Spoke to Tala. Writer informed Tala that we need the name of patient's DME so we know who to send the information to. Tala stated that she will get the information and call the office back.

## 2019-01-18 DIAGNOSIS — E78.00 HYPERCHOLESTEROLEMIA: ICD-10-CM

## 2019-01-22 ENCOUNTER — OFFICE VISIT (OUTPATIENT)
Dept: NEUROLOGY | Age: 59
End: 2019-01-22

## 2019-01-22 VITALS
RESPIRATION RATE: 18 BRPM | DIASTOLIC BLOOD PRESSURE: 80 MMHG | OXYGEN SATURATION: 97 % | HEIGHT: 62 IN | SYSTOLIC BLOOD PRESSURE: 122 MMHG | WEIGHT: 145 LBS | BODY MASS INDEX: 26.68 KG/M2 | HEART RATE: 102 BPM

## 2019-01-22 DIAGNOSIS — G31.9 DIFFUSE BRAIN ATROPHY (HCC): Primary | ICD-10-CM

## 2019-01-22 RX ORDER — MEMANTINE HYDROCHLORIDE 5 MG/1
5 TABLET ORAL DAILY
Qty: 90 TAB | Refills: 3 | Status: SHIPPED | OUTPATIENT
Start: 2019-01-22 | End: 2019-01-22

## 2019-01-22 NOTE — PROGRESS NOTES
Chief Complaint   Patient presents with    Memory Loss       HPI    61-year-old woman whom I saw on March 2018 for cognitive changes here to follow-up. Her workup at the time showing diffuse brain atrophy and a decline in ADLs. At that visit we began low-dose Namenda since she is not a candidate for donepezil due to her various psychotropics. Since I saw her last she is had various medication changes from psychiatry. She is here with her  and there has been notable improvement. She can now do most of her ADLs independently like before to include bathing, feeding, toileting. She can groom herself. She can place her dentures in her mouth herself. She is sometimes incontinent of urine. No seizures. She has shown improvement with the psychiatric med changes.         Review of Systems   Unable to perform ROS: Psychiatric disorder       Past Medical History:   Diagnosis Date    Allergic rhinitis     Brain atrophy 02/2018    Colon polyps     GERD (gastroesophageal reflux disease)     H/O colonoscopy     History of Papanicolaou smear of cervix 2014    Hypercholesterolemia     hypercholesterolemia    Hypothyroidism     IGT (impaired glucose tolerance)     Kidney tumor (benign)     removed in childhood     Other ill-defined conditions(799.89)     right shoulder bursitis    Psychiatric disorder     schizophrenia    Schizophrenia, schizo-affective ([facility] [address]     Thyroid disease      Family History   Family history unknown: Yes     Social History     Socioeconomic History    Marital status: SINGLE     Spouse name: Not on file    Number of children: Not on file    Years of education: Not on file    Highest education level: Not on file   Social Needs    Financial resource strain: Not on file    Food insecurity - worry: Not on file    Food insecurity - inability: Not on file   Dialective needs - medical: Not on file   [place] needs - non-medical: Not on file   Occupational History    Not on file   Tobacco Use    Smoking status: Current Every Day Smoker     Packs/day: 0.50     Years: 15.00     Pack years: 7.50    Smokeless tobacco: Never Used   Substance and Sexual Activity    Alcohol use: No    Drug use: No    Sexual activity: Not Currently     Partners: Male   Other Topics Concern    Not on file   Social History Narrative    ** Merged History Encounter **          Allergies   Allergen Reactions    Bee Sting [Sting, Bee] Unknown (comments)    Bees [Hymenoptera Allergenic Extract] Other (comments)    Ragweed Unknown (comments)         Current Outpatient Medications   Medication Sig    memantine (NAMENDA) 5 mg tablet Take 1 Tab by mouth daily.  pravastatin (PRAVACHOL) 80 mg tablet Take 1 Tab by mouth nightly.  mirabegron ER (MYRBETRIQ) 25 mg ER tablet Take 25 mg by mouth daily.  levothyroxine (SYNTHROID) 50 mcg tablet Take 1 Tab by mouth Daily (before breakfast).  pantoprazole (PROTONIX) 40 mg tablet Take 1 Tab by mouth daily.  QUEtiapine SR (SEROQUEL XR) 300 mg sr tablet Take 300 mg by mouth nightly.  fenofibrate (LOFIBRA) 160 mg tablet Take 1 Tab by mouth daily.  cholecalciferol (VITAMIN D3) 1,000 unit tablet Take 2 Tabs by mouth daily.  OLANZapine (ZYPREXA) 5 mg tablet Take  by mouth nightly.  haloperidol (HALDOL) 10 mg tablet     clotrimazole (LOTRIMIN) 1 % topical cream Apply  to affected area two (2) times a day. X 7-10 days    naproxen sodium (NAPROSYN) 220 mg tablet Take 220 mg by mouth two (2) times daily as needed.  PARoxetine (PAXIL) 20 mg tablet Take  by mouth daily. Indications: should be 10 mg    ibuprofen (MOTRIN) 400 mg tablet Take 1 Tab by mouth every eight (8) hours as needed for Pain.  divalproex ER (DEPAKOTE ER) 500 mg ER tablet Take 500 mg by mouth two (2) times a day.  haloperidol (HALDOL) 5 mg tablet     melatonin 3 mg tablet Take  by mouth.     LORazepam (ATIVAN) 1 mg tablet Take 1 Tab by mouth daily as needed (procedural anxiolysis).  triamcinolone acetonide (KENALOG) 0.1 % topical cream Apply  to affected area two (2) times a day. use thin layer as directed x 3-5 days prn    permethrin (ACTICIN) 5 % topical cream Apply  to affected area now. apply sparingly as directed    sennosides (PERDIEM OVERNIGHT RELIEF) 15 mg Tab Take  by mouth. No current facility-administered medications for this visit. Neurologic Exam     Mental Status   WD/WN adult in NAD, normal grooming  VSS  A&O,  pleasant and smiling, talkative, she rambles and I have to redirect her. She starts to ask me for dental advice. She can follow commands well. PERRL, nonicteric  Face is symmetric, tongue midline  Speech is a little bit stuttering but overall clear  No limb ataxia. She is very fidgety with her hands  Moving all extemities spontaneously and symmetric  Normal gait    CVS RRR  Lungs nonlabored  Skin is warm and dry         Visit Vitals  /80   Pulse (!) 102   Resp 18   Ht 5' 2\" (1.575 m)   Wt 65.8 kg (145 lb)   SpO2 97%   BMI 26.52 kg/m²       Assessment and Plan   Diagnoses and all orders for this visit:    1. Diffuse brain atrophy    Other orders  -     memantine (NAMENDA) 5 mg tablet; Take 1 Tab by mouth daily. 79-year-old woman with schizophrenia who had been having notable cognitive changes and ADL deficiencies who seems to have returned back to her baseline with various adjustments to her psychiatric medications. It is very likely that her psychotropics were contributing to the acute change at the time. She does have notable brain atrophy and is at risk for cognitive changes. I think it would be appropriate to maintain Namenda the way it is. It may mood benefit as well. We will not increase at this time. Rather I would like to see her annually and follow her clinically. If anything changes please let me know.         [address] [facility], [address]  Diplomate ABPN

## 2019-01-22 NOTE — PROGRESS NOTES
Pt here for follow up. Want to know if she needs to continue the [address] Depression screen completed.

## 2019-01-23 DIAGNOSIS — E78.00 HYPERCHOLESTEROLEMIA: ICD-10-CM

## 2019-01-25 RX ORDER — FENOFIBRATE 160 MG/1
160 TABLET ORAL DAILY
Qty: 90 TAB | Refills: 1 | Status: SHIPPED | OUTPATIENT
Start: 2019-01-25 | End: 2019-08-09 | Stop reason: SDUPTHER

## 2019-01-25 RX ORDER — PRAVASTATIN SODIUM 80 MG/1
80 TABLET ORAL
Qty: 90 TAB | Refills: 1 | Status: SHIPPED | OUTPATIENT
Start: 2019-01-25 | End: 2019-08-09 | Stop reason: SDUPTHER

## 2019-01-25 RX ORDER — FENOFIBRATE 160 MG/1
160 TABLET ORAL DAILY
Qty: 30 TAB | Refills: 5 | OUTPATIENT
Start: 2019-01-25

## 2019-01-29 ENCOUNTER — OFFICE VISIT (OUTPATIENT)
Dept: INTERNAL MEDICINE CLINIC | Age: 59
End: 2019-01-29

## 2019-01-29 VITALS
SYSTOLIC BLOOD PRESSURE: 109 MMHG | RESPIRATION RATE: 16 BRPM | TEMPERATURE: 97.6 F | BODY MASS INDEX: 27.42 KG/M2 | HEIGHT: 62 IN | DIASTOLIC BLOOD PRESSURE: 77 MMHG | HEART RATE: 82 BPM | OXYGEN SATURATION: 97 % | WEIGHT: 149 LBS

## 2019-01-29 DIAGNOSIS — K21.9 GASTROESOPHAGEAL REFLUX DISEASE WITHOUT ESOPHAGITIS: ICD-10-CM

## 2019-01-29 DIAGNOSIS — E03.9 ACQUIRED HYPOTHYROIDISM: ICD-10-CM

## 2019-01-29 DIAGNOSIS — R73.02 IGT (IMPAIRED GLUCOSE TOLERANCE): ICD-10-CM

## 2019-01-29 DIAGNOSIS — E55.9 VITAMIN D DEFICIENCY: ICD-10-CM

## 2019-01-29 DIAGNOSIS — E78.00 HYPERCHOLESTEROLEMIA: Primary | ICD-10-CM

## 2019-01-29 DIAGNOSIS — J30.9 ALLERGIC RHINITIS, UNSPECIFIED SEASONALITY, UNSPECIFIED TRIGGER: ICD-10-CM

## 2019-01-29 DIAGNOSIS — F25.9 SCHIZOPHRENIA, SCHIZO-AFFECTIVE (HCC): ICD-10-CM

## 2019-01-29 NOTE — PROGRESS NOTES
Rm 15    Chief Complaint   Patient presents with    Cholesterol Problem     f/u    Thyroid Problem     f/u   pt is fasting    1. Have you been to the ER, urgent care clinic since your last visit? Hospitalized since your last visit? No    2. Have you seen or consulted any other health care providers outside of the [address] [place] [address] since your last visit? Include any pap smears or colon screening.  No    Health Maintenance Due   Topic Date Due    Shingrix Vaccine Age 49> (1 of 2) 03/03/2012    MEDICARE YEARLY EXAM  04/19/2018    BREAST CANCER SCRN MAMMOGRAM  05/09/2019    COLONOSCOPY  06/27/2019     PHQ over the last two weeks 1/22/2019   PHQ Not Done -   Little interest or pleasure in doing things Not at all   Feeling down, depressed, irritable, or hopeless Not at all   Total Score PHQ 2 0     Learning Assessment 1/22/2019   PRIMARY LEARNER Patient   HIGHEST LEVEL OF EDUCATION - PRIMARY LEARNER  -   BARRIERS PRIMARY LEARNER -   Kev [address] LEVEL OF EDUCATION -   [address] -    NEED -   LEARNER PREFERENCE PRIMARY READING   LEARNER Norma 11 -   ANSWERED BY patient   RELATIONSHIP SELF

## 2019-01-29 NOTE — PROGRESS NOTES
HPI:  Presents for f/u lipids, thyroid    Accompanied by     Saw neuro and stable  Kept on namenda  Though, mental status better with adjusted psych meds    Fasting for labs      Past medical, Social, and Family history reviewed    Prior to Admission medications    Medication Sig Start Date End Date Taking? Authorizing Provider   fenofibrate (LOFIBRA) 160 mg tablet Take 1 Tab by mouth daily. 1/25/19  Yes Marylou Coles MD   pravastatin (PRAVACHOL) 80 mg tablet Take 1 Tab by mouth nightly. 1/25/19  Yes Marylou Coles MD   memantine [facility]) 10 mg tablet Take 1 Tab by mouth daily. 1/22/19  Yes Sundeep Devine DO   mirabegron ER (MYRBETRIQ) 25 mg ER tablet Take 25 mg by mouth daily. Yes Provider, Historical   levothyroxine (SYNTHROID) 50 mcg tablet Take 1 Tab by mouth Daily (before breakfast). 9/25/18  Yes Marylou Coles MD   pantoprazole (PROTONIX) 40 mg tablet Take 1 Tab by mouth daily. 9/25/18  Yes Marylou Coles MD   QUEtiapine SR (SEROQUEL XR) 300 mg sr tablet Take 300 mg by mouth nightly. Yes Provider, Historical   haloperidol (HALDOL) 5 mg tablet  5/1/18  Yes Provider, Historical   cholecalciferol (VITAMIN D3) 1,000 unit tablet Take 2 Tabs by mouth daily. 5/16/18  Yes Marylou Coels MD   melatonin 3 mg tablet Take  by mouth. Yes Provider, Historical   OLANZapine (ZYPREXA) 5 mg tablet Take  by mouth nightly. Yes Provider, Historical   LORazepam (ATIVAN) 1 mg tablet Take 1 Tab by mouth daily as needed (procedural anxiolysis). 2/16/18  Yes Marylou Coles MD   haloperidol (HALDOL) 10 mg tablet  12/1/17  Yes Provider, Historical   triamcinolone acetonide (KENALOG) 0.1 % topical cream Apply  to affected area two (2) times a day. use thin layer as directed x 3-5 days prn 3/17/17  Yes Marylou Coles MD   clotrimazole (LOTRIMIN) 1 % topical cream Apply  to affected area two (2) times a day.  X 7-10 days 3/17/17  Yes Marylou Coles MD   PARoxetine (PAXIL) 20 mg tablet Take  by mouth daily. Indications: should be 10 mg   Yes Provider, Historical   ibuprofen (MOTRIN) 400 mg tablet Take 1 Tab by mouth every eight (8) hours as needed for Pain. 1/29/16  Yes Chivo Graham MD   permethrin (ACTICIN) 5 % topical cream Apply  to affected area now. apply sparingly as directed   Yes Provider, Historical   sennosides (PERDIEM OVERNIGHT RELIEF) 15 mg Tab Take  by mouth. Yes Provider, Historical   divalproex ER (DEPAKOTE ER) 500 mg ER tablet Take 500 mg by mouth two (2) times a day. Yes Provider, Historical   naproxen sodium (NAPROSYN) 220 mg tablet Take 220 mg by mouth two (2) times daily as needed. Provider, Historical          ROS  Complete ROS reviewed and negative or stable except as noted in HPI. Physical Exam   Constitutional: She is oriented to person, place, and time. She appears well-nourished. No distress. HENT:   Head: Normocephalic and atraumatic. Eyes: EOM are normal. Pupils are equal, round, and reactive to light. No scleral icterus. Neck: Normal range of motion. Neck supple. No JVD present. Cardiovascular: Normal rate, regular rhythm and normal heart sounds. Exam reveals no gallop and no friction rub. No murmur heard. Pulmonary/Chest: Effort normal and breath sounds normal. No respiratory distress. She has no wheezes. She has no rales. Abdominal: Soft. She exhibits no distension. There is no tenderness. Musculoskeletal: Normal range of motion. She exhibits no edema. Lymphadenopathy:     She has no cervical adenopathy. Neurological: She is alert and oriented to person, place, and time. She exhibits normal muscle tone. Skin: Skin is warm. No rash noted. Psychiatric:   At baseline   Nursing note and vitals reviewed. Prior labs reviewed. Assessment/Plan:    ICD-10-CM ICD-9-CM    1. Hypercholesterolemia E78.00 272.0 CK      LIPID PANEL      METABOLIC PANEL, COMPREHENSIVE   2.  Vitamin D deficiency E55.9 268.9 VITAMIN D, 25 HYDROXY   3. IGT (impaired glucose tolerance) R73.02 790.22 HEMOGLOBIN A1C WITH EAG   4. Acquired hypothyroidism E03.9 244.9 T4, FREE      TSH 3RD GENERATION   5. Gastroesophageal reflux disease without esophagitis K21.9 530.81 CBC WITH AUTOMATED DIFF   6. Allergic rhinitis, unspecified seasonality, unspecified trigger J30.9 477.9    7. Schizophrenia, schizo-affective ([facility] F25.0 295.70      Follow-up Disposition:  Return in about 6 months (around 7/29/2019), or if symptoms worsen or fail to improve, for cholesterol, thyroid.   results and schedule of future studies reviewed with patient  reviewed diet, exercise and weight   cardiovascular risk and specific lipid/LDL goals reviewed  reviewed medications and side effects in detail   Fasting labs  Continue current medications

## 2019-01-30 ENCOUNTER — TELEPHONE (OUTPATIENT)
Dept: NEUROLOGY | Age: 59
End: 2019-01-30

## 2019-01-30 LAB
25(OH)D3+25(OH)D2 SERPL-MCNC: 30.2 NG/ML (ref 30–100)
ALBUMIN SERPL-MCNC: 4.4 G/DL (ref 3.5–5.5)
ALBUMIN/GLOB SERPL: 1.9 {RATIO} (ref 1.2–2.2)
ALP SERPL-CCNC: 81 IU/L (ref 39–117)
ALT SERPL-CCNC: 16 IU/L (ref 0–32)
AST SERPL-CCNC: 35 IU/L (ref 0–40)
BASOPHILS # BLD AUTO: 0 X10E3/UL (ref 0–0.2)
BASOPHILS NFR BLD AUTO: 0 %
BILIRUB SERPL-MCNC: 0.3 MG/DL (ref 0–1.2)
BUN SERPL-MCNC: 10 MG/DL (ref 6–24)
BUN/CREAT SERPL: 16 (ref 9–23)
CALCIUM SERPL-MCNC: 9.6 MG/DL (ref 8.7–10.2)
CHLORIDE SERPL-SCNC: 101 MMOL/L (ref 96–106)
CHOLEST SERPL-MCNC: 168 MG/DL (ref 100–199)
CK SERPL-CCNC: 75 U/L (ref 24–173)
CO2 SERPL-SCNC: 25 MMOL/L (ref 20–29)
CREAT SERPL-MCNC: 0.62 MG/DL (ref 0.57–1)
EOSINOPHIL # BLD AUTO: 0 X10E3/UL (ref 0–0.4)
EOSINOPHIL NFR BLD AUTO: 0 %
ERYTHROCYTE [DISTWIDTH] IN BLOOD BY AUTOMATED COUNT: 14.9 % (ref 12.3–15.4)
EST. AVERAGE GLUCOSE BLD GHB EST-MCNC: 114 MG/DL
GLOBULIN SER CALC-MCNC: 2.3 G/DL (ref 1.5–4.5)
GLUCOSE SERPL-MCNC: 92 MG/DL (ref 65–99)
HBA1C MFR BLD: 5.6 % (ref 4.8–5.6)
HCT VFR BLD AUTO: 40.2 % (ref 34–46.6)
HDLC SERPL-MCNC: 63 MG/DL
HGB BLD-MCNC: 13 G/DL (ref 11.1–15.9)
IMM GRANULOCYTES # BLD AUTO: 0 X10E3/UL (ref 0–0.1)
IMM GRANULOCYTES NFR BLD AUTO: 1 %
LDLC SERPL CALC-MCNC: 80 MG/DL (ref 0–99)
LYMPHOCYTES # BLD AUTO: 2 X10E3/UL (ref 0.7–3.1)
LYMPHOCYTES NFR BLD AUTO: 34 %
MCH RBC QN AUTO: 31.4 PG (ref 26.6–33)
MCHC RBC AUTO-ENTMCNC: 32.3 G/DL (ref 31.5–35.7)
MCV RBC AUTO: 97 FL (ref 79–97)
MONOCYTES # BLD AUTO: 0.7 X10E3/UL (ref 0.1–0.9)
MONOCYTES NFR BLD AUTO: 12 %
NEUTROPHILS # BLD AUTO: 3.1 X10E3/UL (ref 1.4–7)
NEUTROPHILS NFR BLD AUTO: 53 %
PLATELET # BLD AUTO: 273 X10E3/UL (ref 150–379)
POTASSIUM SERPL-SCNC: 5.1 MMOL/L (ref 3.5–5.2)
PROT SERPL-MCNC: 6.7 G/DL (ref 6–8.5)
RBC # BLD AUTO: 4.14 X10E6/UL (ref 3.77–5.28)
SODIUM SERPL-SCNC: 138 MMOL/L (ref 134–144)
T4 FREE SERPL-MCNC: 0.71 NG/DL (ref 0.82–1.77)
TRIGL SERPL-MCNC: 124 MG/DL (ref 0–149)
TSH SERPL DL<=0.005 MIU/L-ACNC: 3.38 UIU/ML (ref 0.45–4.5)
VLDLC SERPL CALC-MCNC: 25 MG/DL (ref 5–40)
WBC # BLD AUTO: 5.8 X10E3/UL (ref 3.4–10.8)

## 2019-01-30 RX ORDER — MEMANTINE HYDROCHLORIDE 10 MG/1
10 TABLET ORAL DAILY
Qty: 90 TAB | Refills: 1 | Status: SHIPPED | OUTPATIENT
Start: 2019-01-30 | End: 2020-01-03 | Stop reason: SDUPTHER

## 2019-01-30 NOTE — TELEPHONE ENCOUNTER
----- Message from Sylvie Lopes sent at 1/30/2019  2:48 PM EST -----  Regarding: Dr. Ron Navarro pt's , is requesting physician  order sent to [facility] on [address]  with increase of Namenda from 5 mg to 10 mg. Best contact is [phone].

## 2019-01-30 NOTE — TELEPHONE ENCOUNTER
----- Message from Mukund Martin sent at 1/30/2019  2:48 PM EST -----  Regarding: Dr. Lizbeth Melgar pt's , is requesting physician  order sent to Long term care on [address]  with increase of Namenda from 5 mg to 10 mg. Best contact is [phone].

## 2019-03-21 DIAGNOSIS — E03.4 HYPOTHYROIDISM DUE TO ACQUIRED ATROPHY OF THYROID: ICD-10-CM

## 2019-03-22 RX ORDER — LEVOTHYROXINE SODIUM 50 UG/1
50 TABLET ORAL
Qty: 30 TAB | Refills: 5 | Status: SHIPPED | OUTPATIENT
Start: 2019-03-22 | End: 2019-09-12 | Stop reason: SDUPTHER

## 2019-05-28 DIAGNOSIS — E55.9 VITAMIN D DEFICIENCY: ICD-10-CM

## 2019-05-28 NOTE — TELEPHONE ENCOUNTER
Medication refill request:    Last Office Visit: 1/29/19  Next Office Visit:    Future Appointments   Date Time Provider Parmjit Thompsoni   7/30/2019  9:45 AM Antonia Coleman MD [facility] verified. Yes    [facility] requesting a refill on the pt's Vitamin D3 1,000 units.

## 2019-05-29 RX ORDER — MELATONIN
2000 DAILY
Qty: 180 TAB | Refills: 3 | Status: SHIPPED | OUTPATIENT
Start: 2019-05-29 | End: 2019-06-11 | Stop reason: SDUPTHER

## 2019-06-11 DIAGNOSIS — E55.9 VITAMIN D DEFICIENCY: ICD-10-CM

## 2019-06-11 RX ORDER — MELATONIN
Qty: 60 TAB | Status: SHIPPED | OUTPATIENT
Start: 2019-06-11 | End: 2020-08-21 | Stop reason: SDUPTHER

## 2019-07-30 ENCOUNTER — OFFICE VISIT (OUTPATIENT)
Dept: INTERNAL MEDICINE CLINIC | Age: 59
End: 2019-07-30

## 2019-07-30 VITALS
SYSTOLIC BLOOD PRESSURE: 114 MMHG | HEIGHT: 62 IN | HEART RATE: 90 BPM | OXYGEN SATURATION: 97 % | RESPIRATION RATE: 14 BRPM | TEMPERATURE: 98.3 F | DIASTOLIC BLOOD PRESSURE: 78 MMHG | WEIGHT: 149.25 LBS | BODY MASS INDEX: 27.47 KG/M2

## 2019-07-30 DIAGNOSIS — Z00.00 MEDICARE ANNUAL WELLNESS VISIT, SUBSEQUENT: Primary | ICD-10-CM

## 2019-07-30 DIAGNOSIS — E03.9 ACQUIRED HYPOTHYROIDISM: ICD-10-CM

## 2019-07-30 DIAGNOSIS — E55.9 VITAMIN D DEFICIENCY: ICD-10-CM

## 2019-07-30 DIAGNOSIS — F25.9 SCHIZOPHRENIA, SCHIZO-AFFECTIVE (HCC): ICD-10-CM

## 2019-07-30 DIAGNOSIS — Z11.1 SCREENING-PULMONARY TB: ICD-10-CM

## 2019-07-30 DIAGNOSIS — R73.02 IGT (IMPAIRED GLUCOSE TOLERANCE): ICD-10-CM

## 2019-07-30 DIAGNOSIS — Z12.31 ENCOUNTER FOR SCREENING MAMMOGRAM FOR BREAST CANCER: ICD-10-CM

## 2019-07-30 DIAGNOSIS — Z00.00 WELL ADULT EXAM: ICD-10-CM

## 2019-07-30 DIAGNOSIS — D12.6 ADENOMATOUS POLYP OF COLON, UNSPECIFIED PART OF COLON: ICD-10-CM

## 2019-07-30 DIAGNOSIS — E78.00 HYPERCHOLESTEROLEMIA: ICD-10-CM

## 2019-07-30 DIAGNOSIS — J30.9 ALLERGIC RHINITIS, UNSPECIFIED SEASONALITY, UNSPECIFIED TRIGGER: ICD-10-CM

## 2019-07-30 DIAGNOSIS — K21.9 GASTROESOPHAGEAL REFLUX DISEASE WITHOUT ESOPHAGITIS: ICD-10-CM

## 2019-07-30 RX ORDER — ZINC SULFATE 50(220)MG
CAPSULE ORAL
Refills: 99 | COMMUNITY
Start: 2019-07-01

## 2019-07-30 NOTE — PATIENT INSTRUCTIONS
Medicare Wellness Visit, Female     The best way to live healthy is to have a lifestyle where you eat a well-balanced diet, exercise regularly, limit alcohol use, and quit all forms of tobacco/nicotine, if applicable. Regular preventive services are another way to keep healthy. Preventive services (vaccines, screening tests, monitoring & exams) can help personalize your care plan, which helps you manage your own care. Screening tests can find health problems at the earliest stages, when they are easiest to treat. [facility] follows the current, evidence-based guidelines published by the [facility] Desean Sampson ([facility]) when recommending preventive services for our patients. Because we follow these guidelines, sometimes recommendations change over time as research supports it. (For example, mammograms used to be recommended annually. Even though Medicare will still pay for an annual mammogram, the newer guidelines recommend a mammogram every two years for women of average risk.)  Of course, you and your doctor may decide to screen more often for some diseases, based on your risk and your health status. Preventive services for you include:  - Medicare offers their members a free annual wellness visit, which is time for you and your primary care provider to discuss and plan for your preventive service needs. Take advantage of this benefit every year!  -All adults over the age of 72 should receive the recommended pneumonia vaccines. Current USPSTF guidelines recommend a series of two vaccines for the best pneumonia protection.   -All adults should have a flu vaccine yearly and a tetanus vaccine every 10 years. All adults age 61 and older should receive a shingles vaccine once in their lifetime.    -A bone mass density test is recommended when a woman turns 65 to screen for osteoporosis. This test is only recommended one time, as a screening.  Some providers will use this same test as a disease monitoring tool if you already have osteoporosis. -All adults age 38-68 who are overweight should have a diabetes screening test once every three years.   -Other screening tests and preventive services for persons with diabetes include: an eye exam to screen for diabetic retinopathy, a kidney function test, a foot exam, and stricter control over your cholesterol.   -Cardiovascular screening for adults with routine risk involves an electrocardiogram (ECG) at intervals determined by your doctor.   -Colorectal cancer screenings should be done for adults age 54-65 with no increased risk factors for colorectal cancer. There are a number of acceptable methods of screening for this type of cancer. Each test has its own benefits and drawbacks. Discuss with your doctor what is most appropriate for you during your annual wellness visit. The different tests include: colonoscopy (considered the best screening method), a fecal occult blood test, a fecal DNA test, and sigmoidoscopy. -Breast cancer screenings are recommended every other year for women of normal risk, age 54-69.  -Cervical cancer screenings for women over age 72 are only recommended with certain risk factors.   -All adults born between [facility] should be screened once for Hepatitis C.      Here is a list of your current Health Maintenance items (your personalized list of preventive services) with a due date:  Health Maintenance Due   Topic Date Due    Pneumococcal Vaccine (1 of 1 - PPSV23) 03/03/1968    Shingles Vaccine (1 of 2) 03/03/2012    Annual Well Visit  04/19/2018    Mammogram  05/09/2019    Colonoscopy  06/27/2019

## 2019-07-30 NOTE — PROGRESS NOTES
This is the Subsequent Medicare Annual Wellness Exam, performed 12 months or more after the Initial AWV or the last Subsequent AWV    I have reviewed the patient's medical history in detail and updated the computerized patient record. History     Past Medical History:   Diagnosis Date    Allergic rhinitis     Brain atrophy 02/2018    Colon polyps     GERD (gastroesophageal reflux disease)     H/O colonoscopy     History of Papanicolaou smear of cervix 2014    Hypercholesterolemia     hypercholesterolemia    Hypothyroidism     IGT (impaired glucose tolerance)     Kidney tumor (benign)     removed in childhood     Other ill-defined conditions(799.89)     right shoulder bursitis    Psychiatric disorder     schizophrenia    Schizophrenia, schizo-affective ([facility]     Thyroid disease       Past Surgical History:   Procedure Laterality Date    HX UROLOGICAL      tumor removal as child kidney     Current Outpatient Medications   Medication Sig Dispense Refill    zinc sulfate (ZINCATE) 220 (50) mg capsule   99    cholecalciferol (VITAMIN D3) 1,000 unit tablet TAKE (2) TABLETS BY MOUTH DAILY. 60 Tab PRN    levothyroxine (SYNTHROID) 50 mcg tablet Take 1 Tab by mouth Daily (before breakfast). 30 Tab 5    memantine (NAMENDA) 10 mg tablet Take 1 Tab by mouth daily. 90 Tab 1    fenofibrate (LOFIBRA) 160 mg tablet Take 1 Tab by mouth daily. 90 Tab 1    pravastatin (PRAVACHOL) 80 mg tablet Take 1 Tab by mouth nightly. 90 Tab 1    mirabegron ER (MYRBETRIQ) 25 mg ER tablet Take 25 mg by mouth daily.  pantoprazole (PROTONIX) 40 mg tablet Take 1 Tab by mouth daily. 30 Tab 11    QUEtiapine SR (SEROQUEL XR) 300 mg sr tablet Take 300 mg by mouth nightly.  haloperidol (HALDOL) 5 mg tablet Take  by mouth nightly.  melatonin 3 mg tablet Take  by mouth.  LORazepam (ATIVAN) 1 mg tablet Take 1 Tab by mouth daily as needed (procedural anxiolysis).  3 Tab 0    haloperidol (HALDOL) 10 mg tablet Take by mouth daily.  triamcinolone acetonide (KENALOG) 0.1 % topical cream Apply  to affected area two (2) times a day. use thin layer as directed x 3-5 days prn 30 g 1    clotrimazole (LOTRIMIN) 1 % topical cream Apply  to affected area two (2) times a day. X 7-10 days 45 g 1    PARoxetine (PAXIL) 20 mg tablet Take  by mouth daily. Indications: should be 10 mg      ibuprofen (MOTRIN) 400 mg tablet Take 1 Tab by mouth every eight (8) hours as needed for Pain. 30 Tab 1    sennosides (PERDIEM OVERNIGHT RELIEF) 15 mg Tab Take  by mouth.  divalproex ER (DEPAKOTE ER) 500 mg ER tablet Take 500 mg by mouth two (2) times a day.  OLANZapine (ZYPREXA) 5 mg tablet Take  by mouth nightly.  naproxen sodium (NAPROSYN) 220 mg tablet Take 220 mg by mouth two (2) times daily as needed.  permethrin (ACTICIN) 5 % topical cream Apply  to affected area now. apply sparingly as directed       Allergies   Allergen Reactions    Bee Sting [Sting, Bee] Unknown (comments)     Patient states this is not an allergy      Bees [Hymenoptera Allergenic Extract] Other (comments)    Ragweed Unknown (comments)     Family History   Family history unknown: Yes     Social History     Tobacco Use    Smoking status: Current Every Day Smoker     Packs/day: 0.50     Years: 15.00     Pack years: 7.50    Smokeless tobacco: Never Used   Substance Use Topics    Alcohol use: No     Patient Active Problem List   Diagnosis Code    [facility] F25.0    Hypothyroidism E03.9    GERD (gastroesophageal reflux disease) K21.9    Kidney tumor (benign) D30.00    Allergic rhinitis J30.9    Hypercholesterolemia E78.00    IGT (impaired glucose tolerance) R73.02    Vitamin D deficiency E55.9    Eczema L30.9    Contusion of abdominal wall S30. 1XXA    Contusion of right breast J71.14CC    Acute folliculitis L38.3    Urinary incontinence R32    Dysphagia R13.10       Depression Risk Factor Screening:     3 most recent PHQ Screens 7/30/2019   PHQ Not Done -   Little interest or pleasure in doing things Not at all   Feeling down, depressed, irritable, or hopeless Not at all   Total Score PHQ 2 0     Alcohol Risk Factor Screening: You do not drink alcohol or very rarely. Functional Ability and Level of Safety:   Hearing Loss  Hearing is good. Activities of Daily Living  The home contains: no safety equipment. Patient does total self care    Fall Risk  No flowsheet data found. Abuse Screen  Patient is not abused    Cognitive Screening   Evaluation of Cognitive Function:  Has your family/caregiver stated any concerns about your memory: no      Patient Care Team   Patient Care Team:  Farideh Gayle MD as PCP - General (Pediatrics)  Eleonora Cody MD (Family Practice)    Assessment/Plan   Education and counseling provided:  Are appropriate based on today's review and evaluation    ICD-10-CM ICD-9-CM    1. Medicare annual wellness visit, subsequent Z00.00 V70.0    2. Screening-pulmonary TB Z11.1 V74.1 AMB POC TUBERCULOSIS, INTRADERMAL (SKIN TEST)   3. Adenomatous polyp of colon, unspecified part of colon D12.6 211.3 REFERRAL TO GASTROENTEROLOGY   4. IGT (impaired glucose tolerance) R73.02 790.22    5. Vitamin D deficiency E55.9 268.9    6. Schizophrenia, schizo-affective ([facility] F25.0 295.70    7. Hypercholesterolemia E78.00 272.0    8. Acquired hypothyroidism E03.9 244.9    9. Gastroesophageal reflux disease without esophagitis K21.9 530.81    10. Allergic rhinitis, unspecified seasonality, unspecified trigger J30.9 477.9    11. Encounter for screening mammogram for breast cancer Z12.31 V76.12 ERIC MAMMO BI SCREENING INCL CAD   12. Well adult exam Z00.00 V70.0      Follow-up and Dispositions    · Return in about 3 weeks (around 8/22/2019), or if symptoms worsen or fail to improve, for cholesterol, thyroid as scheduled.         results and schedule of future studies reviewed with patient  reviewed diet, exercise and weight    cardiovascular risk and specific lipid/LDL goals reviewed  reviewed medications and side effects in detail

## 2019-07-30 NOTE — PROGRESS NOTES
RM 13    Patient not fasting at this time. Care giver request paper scripts for any new medication or refills, unsure of new pharmacy name at this time. Chief Complaint   Patient presents with    Labs     6 month follow up cholesterol and thyroid. 1. Have you been to the ER, urgent care clinic since your last visit? Hospitalized since your last visit? No    2. Have you seen or consulted any other health care providers outside of the [address] [place] [address] since your last visit? Include any pap smears or colon screening. No    Health Maintenance Due   Topic Date Due    Pneumococcal 0-64 years (1 of 1 - PPSV23) 03/03/1968    Shingrix Vaccine Age 50> (1 of 2) 03/03/2012    MEDICARE YEARLY EXAM  04/19/2018    BREAST CANCER SCRN MAMMOGRAM  05/09/2019    COLONOSCOPY  06/27/2019     Abuse Screening Questionnaire 7/30/2019   Do you ever feel afraid of your partner? N   Are you in a relationship with someone who physically or mentally threatens you? N   Is it safe for you to go home?  Y     3 most recent PHQ Screens 7/30/2019   PHQ Not Done -   Little interest or pleasure in doing things Not at all   Feeling down, depressed, irritable, or hopeless Not at all   Total Score PHQ 2 0

## 2019-08-01 LAB
MM INDURATION POC: 2 MM (ref 0–5)
PPD POC: NEGATIVE NEGATIVE

## 2019-08-05 ENCOUNTER — HOSPITAL ENCOUNTER (OUTPATIENT)
Dept: MAMMOGRAPHY | Age: 59
Discharge: HOME | End: 2019-08-05
Attending: INTERNAL MEDICINE
Payer: MEDICARE

## 2019-08-05 DIAGNOSIS — Z12.31 ENCOUNTER FOR SCREENING MAMMOGRAM FOR BREAST CANCER: ICD-10-CM

## 2019-08-05 PROCEDURE — 77067 SCR MAMMO BI INCL CAD: CPT

## 2019-08-09 DIAGNOSIS — E78.00 HYPERCHOLESTEROLEMIA: ICD-10-CM

## 2019-08-09 RX ORDER — FENOFIBRATE 160 MG/1
TABLET ORAL
Qty: 30 TAB | Refills: 11 | Status: SHIPPED | OUTPATIENT
Start: 2019-08-09 | End: 2020-08-21 | Stop reason: SDUPTHER

## 2019-08-09 RX ORDER — PRAVASTATIN SODIUM 80 MG/1
TABLET ORAL
Qty: 30 TAB | Refills: 11 | Status: SHIPPED | OUTPATIENT
Start: 2019-08-09 | End: 2020-08-21 | Stop reason: SDUPTHER

## 2019-09-12 DIAGNOSIS — E03.4 HYPOTHYROIDISM DUE TO ACQUIRED ATROPHY OF THYROID: ICD-10-CM

## 2019-09-13 RX ORDER — LEVOTHYROXINE SODIUM 50 UG/1
TABLET ORAL
Qty: 30 TAB | Refills: 1 | Status: SHIPPED | OUTPATIENT
Start: 2019-09-13 | End: 2019-11-12 | Stop reason: SDUPTHER

## 2019-09-13 RX ORDER — PANTOPRAZOLE SODIUM 40 MG/1
TABLET, DELAYED RELEASE ORAL
Qty: 30 TAB | Refills: 1 | Status: SHIPPED | OUTPATIENT
Start: 2019-09-13 | End: 2019-11-12 | Stop reason: SDUPTHER

## 2019-09-24 DIAGNOSIS — E78.00 HYPERCHOLESTEROLEMIA: ICD-10-CM

## 2019-09-24 RX ORDER — PRAVASTATIN SODIUM 80 MG/1
TABLET ORAL
Qty: 30 TAB | Refills: 11 | Status: CANCELLED | OUTPATIENT
Start: 2019-09-24

## 2019-09-24 RX ORDER — FENOFIBRATE 160 MG/1
TABLET ORAL
Qty: 30 TAB | Refills: 11 | Status: CANCELLED | OUTPATIENT
Start: 2019-09-24

## 2019-09-24 NOTE — TELEPHONE ENCOUNTER
Refills were sent on 8/9/2019 for both the fenofibrate and pravastatin. Please all patient and advise to contact [facility] for these. I do not see that Dr. Yamila Conley has prescribed the sennosides in the past. It is rare to use this as a daily medication, which Dr. Eduardo Esa this?      Thanks  Sindy Centeno MD

## 2019-09-24 NOTE — TELEPHONE ENCOUNTER
Patient has a scheduled appointment in November, but she is about to run out of her medications now.

## 2019-09-30 NOTE — TELEPHONE ENCOUNTER
Pt is now in a new group home  ([facility])and none of the staff are on HIPPA. However Ignacio that called stated that she called [facility] and that they informed her that the pt's Fenofibrate and Pravastatin  Has no refill's. And that the nurse need's to call [facility].

## 2019-10-01 ENCOUNTER — ANESTHESIA EVENT (OUTPATIENT)
Dept: ENDOSCOPY | Age: 59
End: 2019-10-01
Payer: MEDICARE

## 2019-10-01 ENCOUNTER — HOSPITAL ENCOUNTER (OUTPATIENT)
Age: 59
Setting detail: OUTPATIENT SURGERY
Discharge: HOME | End: 2019-10-01
Attending: INTERNAL MEDICINE | Admitting: INTERNAL MEDICINE
Payer: MEDICARE

## 2019-10-01 ENCOUNTER — ANESTHESIA (OUTPATIENT)
Dept: ENDOSCOPY | Age: 59
End: 2019-10-01
Payer: MEDICARE

## 2019-10-01 VITALS
WEIGHT: 149 LBS | RESPIRATION RATE: 13 BRPM | OXYGEN SATURATION: 93 % | BODY MASS INDEX: 27.42 KG/M2 | HEART RATE: 81 BPM | TEMPERATURE: 97.7 F | SYSTOLIC BLOOD PRESSURE: 126 MMHG | DIASTOLIC BLOOD PRESSURE: 68 MMHG | HEIGHT: 62 IN

## 2019-10-01 LAB — COLONOSCOPY, EXTERNAL: NORMAL

## 2019-10-01 PROCEDURE — 74011000250 HC RX REV CODE- 250: Performed by: NURSE ANESTHETIST, CERTIFIED REGISTERED

## 2019-10-01 PROCEDURE — 77030021593 HC FCPS BIOP ENDOSC BSC -A: Performed by: INTERNAL MEDICINE

## 2019-10-01 PROCEDURE — 74011250636 HC RX REV CODE- 250/636: Performed by: NURSE ANESTHETIST, CERTIFIED REGISTERED

## 2019-10-01 PROCEDURE — 88305 TISSUE EXAM BY PATHOLOGIST: CPT

## 2019-10-01 PROCEDURE — 77030013992 HC SNR POLYP ENDOSC BSC -B: Performed by: INTERNAL MEDICINE

## 2019-10-01 PROCEDURE — 76040000007: Performed by: INTERNAL MEDICINE

## 2019-10-01 PROCEDURE — 74011250636 HC RX REV CODE- 250/636: Performed by: INTERNAL MEDICINE

## 2019-10-01 PROCEDURE — 76060000032 HC ANESTHESIA 0.5 TO 1 HR: Performed by: INTERNAL MEDICINE

## 2019-10-01 RX ORDER — SODIUM CHLORIDE 0.9 % (FLUSH) 0.9 %
5-40 SYRINGE (ML) INJECTION AS NEEDED
Status: DISCONTINUED | OUTPATIENT
Start: 2019-10-01 | End: 2019-10-01 | Stop reason: HOSPADM

## 2019-10-01 RX ORDER — LIDOCAINE HYDROCHLORIDE 20 MG/ML
INJECTION, SOLUTION EPIDURAL; INFILTRATION; INTRACAUDAL; PERINEURAL AS NEEDED
Status: DISCONTINUED | OUTPATIENT
Start: 2019-10-01 | End: 2019-10-01 | Stop reason: HOSPADM

## 2019-10-01 RX ORDER — EPINEPHRINE 0.1 MG/ML
1 INJECTION INTRACARDIAC; INTRAVENOUS
Status: DISCONTINUED | OUTPATIENT
Start: 2019-10-01 | End: 2019-10-01 | Stop reason: HOSPADM

## 2019-10-01 RX ORDER — DEXTROMETHORPHAN/PSEUDOEPHED 2.5-7.5/.8
1.2 DROPS ORAL
Status: DISCONTINUED | OUTPATIENT
Start: 2019-10-01 | End: 2019-10-01 | Stop reason: HOSPADM

## 2019-10-01 RX ORDER — ATROPINE SULFATE 0.1 MG/ML
0.5 INJECTION INTRAVENOUS
Status: DISCONTINUED | OUTPATIENT
Start: 2019-10-01 | End: 2019-10-01 | Stop reason: HOSPADM

## 2019-10-01 RX ORDER — SODIUM CHLORIDE 9 MG/ML
150 INJECTION, SOLUTION INTRAVENOUS CONTINUOUS
Status: DISCONTINUED | OUTPATIENT
Start: 2019-10-01 | End: 2019-10-01 | Stop reason: HOSPADM

## 2019-10-01 RX ORDER — FENTANYL CITRATE 50 UG/ML
200 INJECTION, SOLUTION INTRAMUSCULAR; INTRAVENOUS
Status: DISCONTINUED | OUTPATIENT
Start: 2019-10-01 | End: 2019-10-01 | Stop reason: HOSPADM

## 2019-10-01 RX ORDER — SODIUM CHLORIDE 9 MG/ML
INJECTION, SOLUTION INTRAVENOUS
Status: DISCONTINUED | OUTPATIENT
Start: 2019-10-01 | End: 2019-10-01 | Stop reason: HOSPADM

## 2019-10-01 RX ORDER — MIDAZOLAM HYDROCHLORIDE 1 MG/ML
.25-5 INJECTION, SOLUTION INTRAMUSCULAR; INTRAVENOUS
Status: DISCONTINUED | OUTPATIENT
Start: 2019-10-01 | End: 2019-10-01 | Stop reason: HOSPADM

## 2019-10-01 RX ORDER — SODIUM CHLORIDE 0.9 % (FLUSH) 0.9 %
5-40 SYRINGE (ML) INJECTION EVERY 8 HOURS
Status: DISCONTINUED | OUTPATIENT
Start: 2019-10-01 | End: 2019-10-01 | Stop reason: HOSPADM

## 2019-10-01 RX ORDER — PROPOFOL 10 MG/ML
INJECTION, EMULSION INTRAVENOUS AS NEEDED
Status: DISCONTINUED | OUTPATIENT
Start: 2019-10-01 | End: 2019-10-01 | Stop reason: HOSPADM

## 2019-10-01 RX ADMIN — PROPOFOL 25 MG: 10 INJECTION, EMULSION INTRAVENOUS at 08:35

## 2019-10-01 RX ADMIN — PROPOFOL 25 MG: 10 INJECTION, EMULSION INTRAVENOUS at 08:18

## 2019-10-01 RX ADMIN — SODIUM CHLORIDE: 900 INJECTION, SOLUTION INTRAVENOUS at 08:11

## 2019-10-01 RX ADMIN — PROPOFOL 100 MG: 10 INJECTION, EMULSION INTRAVENOUS at 08:14

## 2019-10-01 RX ADMIN — LIDOCAINE HYDROCHLORIDE 70 MG: 20 INJECTION, SOLUTION EPIDURAL; INFILTRATION; INTRACAUDAL; PERINEURAL at 08:14

## 2019-10-01 RX ADMIN — PROPOFOL 25 MG: 10 INJECTION, EMULSION INTRAVENOUS at 08:26

## 2019-10-01 RX ADMIN — PROPOFOL 25 MG: 10 INJECTION, EMULSION INTRAVENOUS at 08:23

## 2019-10-01 RX ADMIN — PROPOFOL 25 MG: 10 INJECTION, EMULSION INTRAVENOUS at 08:31

## 2019-10-01 RX ADMIN — PROPOFOL 25 MG: 10 INJECTION, EMULSION INTRAVENOUS at 08:21

## 2019-10-01 NOTE — ANESTHESIA PREPROCEDURE EVALUATION
Relevant Problems   No relevant active problems       Anesthetic History   No history of anesthetic complications            Review of Systems / Medical History  Patient summary reviewed, nursing notes reviewed and pertinent labs reviewed    Pulmonary  Within defined limits                 Neuro/Psych         Psychiatric history     Cardiovascular  Within defined limits                Exercise tolerance: >4 METS     GI/Hepatic/Renal     GERD: well controlled           Endo/Other      Hypothyroidism       Other Findings              Physical Exam    Airway  Mallampati: II  TM Distance: > 6 cm  Neck ROM: normal range of motion   Mouth opening: Normal     Cardiovascular  Regular rate and rhythm,  S1 and S2 normal,  no murmur, click, rub, or gallop             Dental  No notable dental hx       Pulmonary  Breath sounds clear to auscultation               Abdominal  GI exam deferred       Other Findings            Anesthetic Plan    ASA: 3  Anesthesia type: MAC            Anesthetic plan and risks discussed with: Patient

## 2019-10-01 NOTE — ANESTHESIA POSTPROCEDURE EVALUATION
Post-Anesthesia Evaluation and Assessment    Patient: Nancy Mireles MRN: [id number]  SSN: [id number]    YOB: 1960  Age: 64 y.o. Sex: female      I have evaluated the patient and they are stable and ready for discharge from the PACU. Cardiovascular Function/Vital Signs  Visit Vitals  BP 98/63   Pulse 78   Temp 36.5 °C (97.7 °F)   Resp 12   Ht 5' 2\" (1.575 m)   Wt 67.6 kg (149 lb)   SpO2 100%   Breastfeeding? No   BMI 27.25 kg/m²       Patient is status post MAC anesthesia for Procedure(s):  COLONOSCOPY  ENDOSCOPIC POLYPECTOMY. Nausea/Vomiting: None    Postoperative hydration reviewed and adequate. Pain:  Pain Scale 1: Numeric (0 - 10) (10/01/19 0725)  Pain Intensity 1: 0 (10/01/19 0725)   Managed    Neurological Status: At baseline    Mental Status, Level of Consciousness: Alert and  oriented to person, place, and time    Pulmonary Status:   O2 Device: CO2 nasal cannula (10/01/19 0838)   Adequate oxygenation and airway patent    Complications related to anesthesia: None    Post-anesthesia assessment completed. No concerns    Signed By: Fernanda Roth MD     October 1, 2019              Procedure(s):  COLONOSCOPY  ENDOSCOPIC POLYPECTOMY. MAC    <BSHSIANPOST>    Vitals Value Taken Time   BP 0/0 10/1/2019  8:47 AM   Temp     Pulse 75 10/1/2019  8:49 AM   Resp 11 10/1/2019  8:49 AM   SpO2 96 % 10/1/2019  8:49 AM   Vitals shown include unvalidated device data.

## 2019-10-01 NOTE — H&P
[address] [facility], [address] [facility]          Pre-procedure History and Physical       NAME:  Jaelyn Segura   :   1960   MRN:   [id number]     CHIEF COMPLAINT/HPI: See procedure note    PMH:  Past Medical History:   Diagnosis Date    Allergic rhinitis     Brain atrophy ([facility] [address] 2018    Colon polyps     GERD (gastroesophageal reflux disease)     H/O colonoscopy     History of Papanicolaou smear of cervix     Hypercholesterolemia     hypercholesterolemia    Hypothyroidism     IGT (impaired glucose tolerance)     Kidney tumor (benign)     removed in childhood     Other ill-defined conditions(799.89)     right shoulder bursitis    Psychiatric disorder     schizophrenia    Schizophrenia, schizo-affective ([facility] [address]     Thyroid disease        PSH:  Past Surgical History:   Procedure Laterality Date    HX UROLOGICAL      tumor removal as child kidney       Allergies: Allergies   Allergen Reactions    Bee Sting [Sting, Bee] Unknown (comments)     Patient states this is not an allergy      Bees [Hymenoptera Allergenic Extract] Other (comments)    Ragweed Unknown (comments)    Risperidone Other (comments)       Home Medications:  Prior to Admission Medications   Prescriptions Last Dose Informant Patient Reported? Taking? LORazepam (ATIVAN) 1 mg tablet Not Taking at Unknown time  No No   Sig: Take 1 Tab by mouth daily as needed (procedural anxiolysis). OLANZapine (ZYPREXA) 5 mg tablet 2019 at Unknown time  Yes Yes   Sig: Take  by mouth nightly. PARoxetine (PAXIL) 20 mg tablet 2019 at Unknown time  Yes Yes   Sig: Take  by mouth daily. Indications: should be 10 mg   QUEtiapine SR (SEROQUEL XR) 300 mg sr tablet 2019 at Unknown time  Yes Yes   Sig: Take 300 mg by mouth nightly. cholecalciferol (VITAMIN D3) 1,000 unit tablet 2019 at Unknown time  No Yes   Sig: TAKE (2) TABLETS BY MOUTH DAILY.    clotrimazole (LOTRIMIN) 1 % topical cream   No No   Sig: Apply  to affected area two (2) times a day. X 7-10 days   divalproex ER (DEPAKOTE ER) 500 mg ER tablet 9/30/2019 at Unknown time  Yes Yes   Sig: Take 500 mg by mouth two (2) times a day. fenofibrate (LOFIBRA) 160 mg tablet 9/30/2019 at Unknown time  No Yes   Sig: TAKE 1 TABLET BY MOUTH EVERY DAY   haloperidol (HALDOL) 10 mg tablet 9/30/2019 at Unknown time  Yes Yes   Sig: Take  by mouth daily. haloperidol (HALDOL) 5 mg tablet 9/30/2019 at Unknown time  Yes Yes   Sig: Take  by mouth nightly. ibuprofen (MOTRIN) 400 mg tablet   No No   Sig: Take 1 Tab by mouth every eight (8) hours as needed for Pain.   levothyroxine (SYNTHROID) 50 mcg tablet 9/30/2019 at Unknown time  No Yes   Sig: TAKE 1 TABLET BY MOUTH EVERY MORNING BEFORE BREAKFAST   melatonin 3 mg tablet 9/30/2019 at Unknown time  Yes Yes   Sig: Take  by mouth.   memantine (NAMENDA) 10 mg tablet 9/30/2019 at Unknown time  No Yes   Sig: Take 1 Tab by mouth daily. mirabegron ER (MYRBETRIQ) 25 mg ER tablet 9/30/2019 at Unknown time  Yes Yes   Sig: Take 25 mg by mouth daily. naproxen sodium (NAPROSYN) 220 mg tablet 9/30/2019 at Unknown time  Yes Yes   Sig: Take 220 mg by mouth two (2) times daily as needed. pantoprazole (PROTONIX) 40 mg tablet 9/30/2019 at Unknown time  No Yes   Sig: TAKE 1 TABLET BY MOUTH EVERY DAY   permethrin (ACTICIN) 5 % topical cream   Yes No   Sig: Apply  to affected area now. apply sparingly as directed   pravastatin (PRAVACHOL) 80 mg tablet 9/30/2019 at Unknown time  No Yes   Sig: TAKE ONE TABLET BY MOUTH AT BEDTIME   sennosides (PERDIEM OVERNIGHT RELIEF) 15 mg Tab 9/30/2019 at Unknown time  Yes Yes   Sig: Take  by mouth.   triamcinolone acetonide (KENALOG) 0.1 % topical cream   No No   Sig: Apply  to affected area two (2) times a day.  use thin layer as directed x 3-5 days prn   zinc sulfate (ZINCATE) 220 (50) mg capsule 9/30/2019 at Unknown time  Yes Yes      Facility-Administered Medications: None       Hospital Medications:  Current Facility-Administered Medications   Medication Dose Route Frequency    0.9% sodium chloride infusion  150 mL/hr IntraVENous CONTINUOUS    sodium chloride (NS) flush 5-40 mL  5-40 mL IntraVENous Q8H    sodium chloride (NS) flush 5-40 mL  5-40 mL IntraVENous PRN    midazolam (VERSED) injection 0.25-5 mg  0.25-5 mg IntraVENous Multiple    fentaNYL citrate (PF) injection 200 mcg  200 mcg IntraVENous Multiple    simethicone (MYLICON) 64KS/0.9RS oral drops 80 mg  1.2 mL Oral Multiple    atropine injection 0.5 mg  0.5 mg IntraVENous ONCE PRN    EPINEPHrine (ADRENALIN) 0.1 mg/mL syringe 1 mg  1 mg Endoscopically ONCE PRN     Facility-Administered Medications Ordered in Other Encounters   Medication Dose Route Frequency    0.9% sodium chloride infusion   IntraVENous CONTINUOUS       Family History:  Family History   Family history unknown: Yes       Social History:  Social History     Tobacco Use    Smoking status: Current Every Day Smoker     Packs/day: 0.50     Years: 15.00     Pack years: 7.50    Smokeless tobacco: Never Used   Substance Use Topics    Alcohol use: No         PHYSICAL EXAM PRIOR TO SEDATION:  General: Alert, in no acute distress    Lungs:            CTA bilaterally  Heart:  Normal S1, S2    Abdomen: Soft, Non distended, Non tender. Normoactive bowel sounds. Assessment:   Stable for sedation administration.   Date of last colonoscopy: 5 yrs, Polyps  Yes    Plan:   · Endoscopic procedure with sedation     Signed By: Jefferson Donaldson MD     10/1/2019  8:12 AM

## 2019-10-01 NOTE — PERIOP NOTES

## 2019-10-01 NOTE — ROUTINE PROCESS
Batsheva R James 1960 
[id number] Situation: 
Verbal report received from: Gaby 
Procedure: Procedure(s): 
COLONOSCOPY 
ENDOSCOPIC POLYPECTOMY Background: 
 
Preoperative diagnosis: HX OF POLYPS Postoperative diagnosis: 1. Cecal polyps x2 2. Ascending colon polyps x4 3. Transverse colon polyp :  Dr. Idris Causey Assistant(s): Endoscopy Technician-1: Jorge Pennington Endoscopy RN-1: Kelly Graff Endoscopy RN-2: Angel Gilliam Specimens:  
ID Type Source Tests Collected by Time Destination 1 : cecal polyps Preservative Cecum  Hali Mendez MD 10/1/2019 5396 Pathology 2 : Ascending colon polyps Preservative Colon, Ascending  Hali Mendez MD 10/1/2019 3451 Pathology 3 : Transverse colon polyp Preservative Colon, Transverse  Hali Mendez MD 10/1/2019 7834 Pathology H. Pylori  no Assessment: 
Intra-procedure medications Anesthesia gave intra-procedure sedation and medications, see anesthesia flow sheet yes Intravenous fluids: NS@ Darra Moons Vital signs stable Abdominal assessment: round and soft Recommendation: 
Discharge patient per MD order. Family or Friend Permission to share finding with family or friend yes

## 2019-10-01 NOTE — PROCEDURES
[facility] 912 Reyna Rick M.D.  [facility], [address] [facility]  [phone]               Colonoscopy Procedure Note    NAME: Jaelyn Segura  :  1960  MRN:  [id number]    Indications:   Personal history of colon polyps (screening only)     : Adry Ladd MD    Referring Provider:  Vandana Young MD    Medicines:  MAC anesthesia      Procedure Details:  After informed consent was obtained with all risks and benefits of the procedure explained and preprocedure exam completed, the patient was placed in the left lateral decubitus position. Universal protocol for patient identification was performed and documented in the nursing notes. Throughout the procedure, the patient's blood pressure was monitored at least every five minutes; pulse, and oxygen saturations were monitored continuously. All vital signs were documented in the nursing notes. A digital rectal exam was performed and was normal.  The Olympus videocolonoscope  was inserted in the rectum and carefully advanced to the cecum, which was identified by the ileocecal valve and appendiceal orifice. The colonoscope was slowly withdrawn with careful evaluation between folds. Retroflexion in the rectum was performed; findings and interventions are described below. Procedure start time, extent reached time/cecum time, and procedure end time are documented in the nursing notes. The quality of preparation was good.        Findings:   Rectum: normal  Sigmoid: normal  Descending Colon: normal  Transverse Colon: diminutive polyp s/p cold forceps polypectomy  Ascending Colon: 4  Sessile polyp(s), the largest 8 mm in size; s/p cold forceps and cold snare polypectomy  Cecum: two diminutive polyps s/p cold forceps polypectomy    Interventions:    7 complete polypectomy were performed using cold snare  /forceps and the polyps were  retrieved    Specimens:   ID Type Source Tests Collected by Time Destination   1 : cecal polyps Preservative Cecum  Radha Dale MD 10/1/2019 1519 Pathology   2 : Ascending colon polyps Preservative Colon, Ascending  Radha Dale MD 10/1/2019 0827 Pathology   3 : Transverse colon polyp Preservative Colon, Transverse  Radha Dale MD 10/1/2019 4253 Pathology       EBL:  None. Complications:   No immediate complications     Impression:  -See post-procedure diagnoses. Recommendations:   -Repeat colonoscopy in 3 years. If < 10 years, reason:  above average risk patient    Resume normal medication(s). Signed by:  Kulwinder Brar MD          10/1/2019  8:40 AM

## 2019-10-01 NOTE — TELEPHONE ENCOUNTER
Called [facility] long term pharmacy, they advised that they did not receive refills for patients medications. I gave verbal orders for each prescription as it was ordered by the provider.

## 2019-10-01 NOTE — DISCHARGE INSTRUCTIONS
[facility] 9[address] Qiana Anton M.D.  [address] [place] [address], [address] [place] [address]  [phone]          COLONOSCOPY DISCHARGE INSTRUCTIONS    Woody Warren  [id number]  1960    DISCOMFORT:  Redness at IV site- apply warm compress to area; if redness or soreness persist- contact your physician  There may be a slight amount of blood passed from the rectum  Gaseous discomfort- walking, belching will help relieve any discomfort  You may not operate a vehicle for 12 hours  You may not engage in an occupation involving machinery or appliances for the  rest of today  You may not drink alcoholic beverages for at least 12 hours  Avoid making any critical decisions for at least 24 hours    DIET:   You may resume your normal diet, but some patients find that heavy or large  meals may lead to indigestion or vomiting. I suggest a light meal as first food  intake. I recommend a whole food, plant-based diet for your overall health. ACTIVITY:  You may resume your normal daily activities. It is recommended that you spend the remainder of the day resting - avoid any strenuous activity. CALL M.D. IF ANY SIGN OF:   Increasing pain, nausea, vomiting  Abdominal distension (swelling)  Significant bleeding (oral or rectal)  Fever   Pain in chest area  Shortness of breath    Additional Instructions:   Call Dr. Qiana Anton if any questions or problems at [phone]   You should receive the biopsy results by phone or mail within 3 weeks, if not, call  my office for the results      Should have a repeat colonoscopy in 3 years. Colonoscopy showed 7 polyps removed.

## 2019-11-18 NOTE — PROGRESS NOTES
Rm#[id number]  FASTING   Presents w/ Case aly  C/o excessive thirst       Chief Complaint   Patient presents with    Cholesterol Problem     f/u    Thyroid Problem     f/u     1. Have you been to the ER, urgent care clinic since your last visit? Hospitalized since your last visit? No    2. Have you seen or consulted any other health care providers outside of the [address] [place] [address] since your last visit? Include any pap smears or colon screening.  No   PSY -Dr. Adeola Mcdonald,   Refused flu vaccine     Health Maintenance Due   Topic Date Due    Pneumococcal 0-64 years (1 of 1 - PPSV23) 03/03/1968    Shingrix Vaccine Age 50> (1 of 2) 03/03/2012     3 most recent PHQ Screens 11/19/2019   PHQ Not Done -   Little interest or pleasure in doing things Not at all   Feeling down, depressed, irritable, or hopeless Not at all   Total Score PHQ 2 0

## 2019-11-19 ENCOUNTER — OFFICE VISIT (OUTPATIENT)
Dept: INTERNAL MEDICINE CLINIC | Age: 59
End: 2019-11-19

## 2019-11-19 VITALS
BODY MASS INDEX: 28.71 KG/M2 | OXYGEN SATURATION: 97 % | WEIGHT: 156 LBS | DIASTOLIC BLOOD PRESSURE: 79 MMHG | TEMPERATURE: 97.7 F | RESPIRATION RATE: 24 BRPM | HEART RATE: 80 BPM | SYSTOLIC BLOOD PRESSURE: 112 MMHG | HEIGHT: 62 IN

## 2019-11-19 DIAGNOSIS — M25.512 ACUTE PAIN OF BOTH SHOULDERS: Primary | ICD-10-CM

## 2019-11-19 DIAGNOSIS — M65.341 TRIGGER RING FINGER OF RIGHT HAND: ICD-10-CM

## 2019-11-19 DIAGNOSIS — E03.9 ACQUIRED HYPOTHYROIDISM: ICD-10-CM

## 2019-11-19 DIAGNOSIS — F25.9 SCHIZOPHRENIA, SCHIZO-AFFECTIVE (HCC): ICD-10-CM

## 2019-11-19 DIAGNOSIS — E55.9 VITAMIN D DEFICIENCY: ICD-10-CM

## 2019-11-19 DIAGNOSIS — M25.511 ACUTE PAIN OF BOTH SHOULDERS: Primary | ICD-10-CM

## 2019-11-19 DIAGNOSIS — E03.4 HYPOTHYROIDISM DUE TO ACQUIRED ATROPHY OF THYROID: ICD-10-CM

## 2019-11-19 DIAGNOSIS — R73.02 IGT (IMPAIRED GLUCOSE TOLERANCE): ICD-10-CM

## 2019-11-19 DIAGNOSIS — J30.9 ALLERGIC RHINITIS, UNSPECIFIED SEASONALITY, UNSPECIFIED TRIGGER: ICD-10-CM

## 2019-11-19 DIAGNOSIS — E78.00 HYPERCHOLESTEROLEMIA: ICD-10-CM

## 2019-11-19 RX ORDER — CYCLOBENZAPRINE HCL 10 MG
5 TABLET ORAL
Qty: 20 TAB | Refills: 1 | Status: SHIPPED | OUTPATIENT
Start: 2019-11-19 | End: 2020-01-17

## 2019-11-19 NOTE — PROGRESS NOTES
HPI:  Presents for f/u thyroid, lipids, etc    Accompanied by     Shoulder pain - bilateral   Worse in the AM  Worse with movement     Hand - right ring finger sticks and pops back into place    Other vague arthralgia complaints. Mood and behavior at baseline on current med regimen. Past medical, Social, and Family history reviewed    Prior to Admission medications    Medication Sig Start Date End Date Taking? Authorizing Provider   pantoprazole (PROTONIX) 40 mg tablet TAKE 1 TABLET BY MOUTH EVERY DAY 11/12/19  Yes Koko Phillips MD   levothyroxine (SYNTHROID) 50 mcg tablet TAKE 1 TABLET BY MOUTH EVERY MORNING BEFORE BREAKFAST 11/12/19  Yes Koko Phillips MD   fenofibrate (LOFIBRA) 160 mg tablet TAKE 1 TABLET BY MOUTH EVERY DAY 8/9/19  Yes Evelyn Tee MD   pravastatin (PRAVACHOL) 80 mg tablet TAKE ONE TABLET BY MOUTH AT BEDTIME 8/9/19  Yes Evelyn Tee MD   zinc sulfate (ZINCATE) 220 (50) mg capsule  7/1/19  Yes Provider, Historical   cholecalciferol (VITAMIN D3) 1,000 unit tablet TAKE (2) TABLETS BY MOUTH DAILY. 6/11/19  Yes Koko Phillips MD   [facility]) 10 mg tablet Take 1 Tab by mouth daily. 1/30/19  Yes Sundeep Devine DO   mirabegron ER (MYRBETRIQ) 25 mg ER tablet Take 50 mg by mouth daily. Yes Provider, Historical   QUEtiapine SR (SEROQUEL XR) 300 mg sr tablet Take 300 mg by mouth nightly. Yes Provider, Historical   haloperidol (HALDOL) 10 mg tablet Take  by mouth daily. 12/1/17  Yes Provider, Historical   PARoxetine (PAXIL) 20 mg tablet Take 10 mg by mouth daily. Indications: should be 10 mg   Yes Provider, Historical   sennosides (PERDIEM OVERNIGHT RELIEF) 15 mg Tab Take  by mouth. Yes Provider, Historical   divalproex ER (DEPAKOTE ER) 500 mg ER tablet Take 500 mg by mouth two (2) times a day. Yes Provider, Historical   haloperidol (HALDOL) 5 mg tablet Take 10 mg by mouth nightly.  5/1/18   Provider, Historical   melatonin 3 mg tablet Take  by mouth. Provider, Historical   OLANZapine (ZYPREXA) 5 mg tablet Take  by mouth nightly. Provider, Historical   LORazepam (ATIVAN) 1 mg tablet Take 1 Tab by mouth daily as needed (procedural anxiolysis). 2/16/18   Porsche Weaver MD   triamcinolone acetonide (KENALOG) 0.1 % topical cream Apply  to affected area two (2) times a day. use thin layer as directed x 3-5 days prn 3/17/17   Porsche Weaver MD   clotrimazole (LOTRIMIN) 1 % topical cream Apply  to affected area two (2) times a day. X 7-10 days 3/17/17   Porsche Weaver MD   naproxen sodium (NAPROSYN) 220 mg tablet Take 220 mg by mouth two (2) times daily as needed. Provider, Historical   ibuprofen (MOTRIN) 400 mg tablet Take 1 Tab by mouth every eight (8) hours as needed for Pain. 1/29/16   Porsche Weaver MD   permethrin (ACTICIN) 5 % topical cream Apply  to affected area now. apply sparingly as directed    Provider, Historical          ROS  Complete ROS reviewed and negative or stable except as noted in HPI. Physical Exam   Constitutional: She is oriented to person, place, and time. She appears well-nourished. No distress. HENT:   Head: Normocephalic and atraumatic. Eyes: Pupils are equal, round, and reactive to light. EOM are normal. No scleral icterus. Neck: Normal range of motion. Neck supple. No JVD present. Cardiovascular: Normal rate, regular rhythm and normal heart sounds. Exam reveals no gallop and no friction rub. No murmur heard. Pulmonary/Chest: Effort normal and breath sounds normal. No respiratory distress. She has no wheezes. She has no rales. Abdominal: Soft. She exhibits no distension. There is no tenderness. Musculoskeletal: Normal range of motion. General: No edema. Comments: Normal shoulder strength bilaterally. +/- impingement bilaterally. Exam is inconsistent. Trigger finger demonstrated. Lymphadenopathy:     She has no cervical adenopathy.    Neurological: She is alert and oriented to person, place, and time. She exhibits normal muscle tone. Skin: Skin is warm. No rash noted. Psychiatric:   At baseline   Nursing note and vitals reviewed. Prior labs reviewed. Reviewed prior imaging reports      Assessment/Plan:    ICD-10-CM ICD-9-CM    1. Acute pain of both shoulders M25.511 719.41 CBC WITH AUTOMATED DIFF    M25.512  CK      SED RATE (ESR)      C REACTIVE PROTEIN, QT      RA + CCP ABS      MYRIAM BY IFA REFLEX   2. Hypercholesterolemia E78.00 272.0 LIPID PANEL      METABOLIC PANEL, COMPREHENSIVE   3. Hypothyroidism due to acquired atrophy of thyroid E03.4 244.8 T4, FREE     246.8 TSH 3RD GENERATION   4. IGT (impaired glucose tolerance) R73.02 790.22 HEMOGLOBIN A1C WITH EAG   5. Vitamin D deficiency E55.9 268.9 VITAMIN D, 25 HYDROXY   6. Schizophrenia, schizo-affective ([facility] [address] F25.0 295.70    7. Acquired hypothyroidism E03.9 244.9    8. Allergic rhinitis, unspecified seasonality, unspecified trigger J30.9 477.9    9. Trigger ring finger of right hand M65.341 727.03 REFERRAL TO HAND SURGERY     Follow-up and Dispositions    · Return in about 6 months (around 5/19/2020), or if symptoms worsen or fail to improve, for thyroid, cholesterol. results and schedule of future studies reviewed with patient  reviewed diet, exercise and weight   cardiovascular risk and specific lipid/LDL goals reviewed  reviewed medications and side effects in detail  Pt declines flu shot  Check labs, inflammatory   Flexeril   Consider Hand surgeon if trigger finger becomes painfull or limits function.

## 2019-11-20 DIAGNOSIS — E87.5 HYPERKALEMIA: Primary | ICD-10-CM

## 2019-11-20 DIAGNOSIS — K21.9 GASTROESOPHAGEAL REFLUX DISEASE WITHOUT ESOPHAGITIS: ICD-10-CM

## 2019-11-21 DIAGNOSIS — R76.8 CYCLIC CITRULLINATED PEPTIDE (CCP) ANTIBODY POSITIVE: Primary | ICD-10-CM

## 2019-11-21 DIAGNOSIS — M25.512 ACUTE PAIN OF BOTH SHOULDERS: ICD-10-CM

## 2019-11-21 DIAGNOSIS — M25.511 ACUTE PAIN OF BOTH SHOULDERS: ICD-10-CM

## 2019-11-21 DIAGNOSIS — M25.50 ARTHRALGIA, UNSPECIFIED JOINT: ICD-10-CM

## 2019-11-21 LAB
25(OH)D3+25(OH)D2 SERPL-MCNC: 32.9 NG/ML (ref 30–100)
ALBUMIN SERPL-MCNC: 4.1 G/DL (ref 3.5–5.5)
ALBUMIN/GLOB SERPL: 1.8 {RATIO} (ref 1.2–2.2)
ALP SERPL-CCNC: 75 IU/L (ref 39–117)
ALT SERPL-CCNC: 15 IU/L (ref 0–32)
ANA TITR SER IF: NEGATIVE {TITER}
AST SERPL-CCNC: 32 IU/L (ref 0–40)
BASOPHILS # BLD AUTO: 0 X10E3/UL (ref 0–0.2)
BASOPHILS NFR BLD AUTO: 0 %
BILIRUB SERPL-MCNC: 0.4 MG/DL (ref 0–1.2)
BUN SERPL-MCNC: 12 MG/DL (ref 6–24)
BUN/CREAT SERPL: 17 (ref 9–23)
CALCIUM SERPL-MCNC: 9.6 MG/DL (ref 8.7–10.2)
CCP IGA+IGG SERPL IA-ACNC: 97 UNITS (ref 0–19)
CHLORIDE SERPL-SCNC: 98 MMOL/L (ref 96–106)
CHOLEST SERPL-MCNC: 144 MG/DL (ref 100–199)
CK SERPL-CCNC: 82 U/L (ref 24–173)
CO2 SERPL-SCNC: 23 MMOL/L (ref 20–29)
CREAT SERPL-MCNC: 0.69 MG/DL (ref 0.57–1)
CRP SERPL-MCNC: 1 MG/L (ref 0–10)
EOSINOPHIL # BLD AUTO: 0 X10E3/UL (ref 0–0.4)
EOSINOPHIL NFR BLD AUTO: 0 %
ERYTHROCYTE [DISTWIDTH] IN BLOOD BY AUTOMATED COUNT: 12.7 % (ref 12.3–15.4)
ERYTHROCYTE [SEDIMENTATION RATE] IN BLOOD BY WESTERGREN METHOD: 2 MM/HR (ref 0–40)
EST. AVERAGE GLUCOSE BLD GHB EST-MCNC: 120 MG/DL
GLOBULIN SER CALC-MCNC: 2.3 G/DL (ref 1.5–4.5)
GLUCOSE SERPL-MCNC: 77 MG/DL (ref 65–99)
HBA1C MFR BLD: 5.8 % (ref 4.8–5.6)
HCT VFR BLD AUTO: 37.5 % (ref 34–46.6)
HDLC SERPL-MCNC: 45 MG/DL
HGB BLD-MCNC: 12.5 G/DL (ref 11.1–15.9)
IMM GRANULOCYTES # BLD AUTO: 0 X10E3/UL (ref 0–0.1)
IMM GRANULOCYTES NFR BLD AUTO: 1 %
LDLC SERPL CALC-MCNC: 73 MG/DL (ref 0–99)
LYMPHOCYTES # BLD AUTO: 2.3 X10E3/UL (ref 0.7–3.1)
LYMPHOCYTES NFR BLD AUTO: 30 %
MCH RBC QN AUTO: 31.8 PG (ref 26.6–33)
MCHC RBC AUTO-ENTMCNC: 33.3 G/DL (ref 31.5–35.7)
MCV RBC AUTO: 95 FL (ref 79–97)
MONOCYTES # BLD AUTO: 0.6 X10E3/UL (ref 0.1–0.9)
MONOCYTES NFR BLD AUTO: 8 %
NEUTROPHILS # BLD AUTO: 4.6 X10E3/UL (ref 1.4–7)
NEUTROPHILS NFR BLD AUTO: 61 %
PLATELET # BLD AUTO: 290 X10E3/UL (ref 150–450)
POTASSIUM SERPL-SCNC: 5.5 MMOL/L (ref 3.5–5.2)
PROT SERPL-MCNC: 6.4 G/DL (ref 6–8.5)
RBC # BLD AUTO: 3.93 X10E6/UL (ref 3.77–5.28)
RHEUMATOID FACT SERPL-ACNC: <10 IU/ML (ref 0–13.9)
SODIUM SERPL-SCNC: 135 MMOL/L (ref 134–144)
T4 FREE SERPL-MCNC: 1.18 NG/DL (ref 0.82–1.77)
TRIGL SERPL-MCNC: 128 MG/DL (ref 0–149)
TSH SERPL DL<=0.005 MIU/L-ACNC: 3.41 UIU/ML (ref 0.45–4.5)
VLDLC SERPL CALC-MCNC: 26 MG/DL (ref 5–40)
WBC # BLD AUTO: 7.6 X10E3/UL (ref 3.4–10.8)

## 2019-11-21 NOTE — PROGRESS NOTES
Please notify pt's  of results     CCP antibody test was strongly positive but the inflammatory markers are normal.  The CCP antibody is associated with rheumatoid arthritis. The lack of inflammatory marker elevation suggests that if present the disease is not acutely active. But, since she is complaining of various joint pains, this finding requires a review by a rheumatology specialist.  Please make an appt with Dr. Odin Lopes or colleague. [phone].

## 2019-11-21 NOTE — PROGRESS NOTES
Potassium is a little high, but this may just be a lab error due to a difficult blood draw. Please have her return in a week or so to repeat this to be sure and have her stay well hydrated. HgbA1C (average blood sugar) is just above the threshold to call it pre-diabetes. This is likely due to the seroquel. But, no new medication is needed, just less sugars and carbohydrates in the diet for now. All other labs thus far are normal.  The antibody testing is still pending, but the inflammatory markers are normal (sed rate and CRP). So, I do not anticipate that the antibodies will be abnormal.  Her shoulder pain is not likely to be due to any inflammatory disease process.

## 2019-12-05 NOTE — PROGRESS NOTES
LVM for [facility] number provided in chart to return call in regards to pts lab results.

## 2020-01-03 ENCOUNTER — OFFICE VISIT (OUTPATIENT)
Dept: NEUROLOGY | Age: 60
End: 2020-01-03

## 2020-01-03 VITALS
HEIGHT: 62 IN | OXYGEN SATURATION: 97 % | WEIGHT: 154 LBS | HEART RATE: 81 BPM | BODY MASS INDEX: 28.34 KG/M2 | RESPIRATION RATE: 16 BRPM

## 2020-01-03 DIAGNOSIS — G31.9 DIFFUSE BRAIN ATROPHY (HCC): Primary | ICD-10-CM

## 2020-01-03 DIAGNOSIS — G21.19 PARKINSONISM DUE TO DRUGS (HCC): ICD-10-CM

## 2020-01-03 DIAGNOSIS — R41.89 COGNITIVE DECLINE: ICD-10-CM

## 2020-01-03 RX ORDER — MEMANTINE HYDROCHLORIDE 10 MG/1
10 TABLET ORAL DAILY
Qty: 90 TAB | Refills: 3 | Status: SHIPPED | OUTPATIENT
Start: 2020-01-03 | End: 2021-01-11 | Stop reason: SDUPTHER

## 2020-01-03 NOTE — PROGRESS NOTES
Chief Complaint   Patient presents with    Memory Loss       HPI    62-year-old woman with psychiatric disease chronically here to follow-up. I saw her a year ago. She is here with a member of her group home. Her level of function is that she can do her own ADLs such as toileting and showering but she does need some direction and guidance. She can do her own dressing and feeding. She can manage her own dentures. She can follow commands and do the chores required of her group home. She is very disinhibited and needs a lot of redirection during this visit. She perseverates on her trigger finger on the right hand. She is on multiple psychotropics which is the reason why she cannot take Aricept. Background:  62-year-old woman whom I saw on March 2018 for cognitive changes here to follow-up. Her workup at the time showing diffuse brain atrophy and a decline in ADLs. At that visit we began low-dose Namenda since she is not a candidate for donepezil due to her various psychotropics. Since I saw her last she is had various medication changes from psychiatry. She is here with her  and there has been notable improvement. She can now do most of her ADLs independently like before to include bathing, feeding, toileting. She can groom herself. She can place her dentures in her mouth herself. She is sometimes incontinent of urine. No seizures. She has shown improvement with the psychiatric med changes.             Review of Systems   Unable to perform ROS: Psychiatric disorder       Past Medical History:   Diagnosis Date    Allergic rhinitis     Brain atrophy ([facility] [address] 02/2018    Colon polyps     GERD (gastroesophageal reflux disease)     H/O colonoscopy     History of Papanicolaou smear of cervix 2014    Hypercholesterolemia     hypercholesterolemia    Hypothyroidism     IGT (impaired glucose tolerance)     Kidney tumor (benign)     removed in childhood     Other ill-defined conditions(799.89)     right shoulder bursitis    Psychiatric disorder     schizophrenia    Schizophrenia[facility]affective ([facility] [address]     Thyroid disease      Family History   Family history unknown: Yes     Social History     Socioeconomic History    Marital status: SINGLE     Spouse name: Not on file    Number of children: Not on file    Years of education: Not on file    Highest education level: Not on file   Occupational History    Not on file   Social Needs    Financial resource strain: Not on file    Food insecurity:     Worry: Not on file     Inability: Not on file    Transportation needs:     Medical: Not on file     Non-medical: Not on file   Tobacco Use    Smoking status: Current Every Day Smoker     Packs/day: 0.50     Years: 15.00     Pack years: 7.50    Smokeless tobacco: Never Used   Substance and Sexual Activity    Alcohol use: No    Drug use: No    Sexual activity: Not Currently     Partners: Male   Lifestyle    Physical activity:     Days per week: Not on file     Minutes per session: Not on file    Stress: Not on file   Relationships    Social connections:     Talks on phone: Not on file     Gets together: Not on file     Attends Rastafari service: Not on file     Active member of club or organization: Not on file     Attends meetings of clubs or organizations: Not on file     Relationship status: Not on file    Intimate partner violence:     Fear of current or ex partner: Not on file     Emotionally abused: Not on file     Physically abused: Not on file     Forced sexual activity: Not on file   Other Topics Concern    Not on file   Social History Narrative    ** Merged History Encounter **          Allergies   Allergen Reactions    Bee Sting [Sting, Bee] Unknown (comments)     Patient states this is not an allergy      Bees [Hymenoptera Allergenic Extract] Other (comments)    Ragweed Unknown (comments)    Risperidone Other (comments)         Current Outpatient Medications Medication Sig    memantine (NAMENDA) 10 mg tablet Take 1 Tab by mouth daily.  cyclobenzaprine (FLEXERIL) 10 mg tablet Take 0.5 Tabs by mouth three (3) times daily as needed for Muscle Spasm(s).  pantoprazole (PROTONIX) 40 mg tablet TAKE 1 TABLET BY MOUTH EVERY DAY    levothyroxine (SYNTHROID) 50 mcg tablet TAKE 1 TABLET BY MOUTH EVERY MORNING BEFORE BREAKFAST    fenofibrate (LOFIBRA) 160 mg tablet TAKE 1 TABLET BY MOUTH EVERY DAY    pravastatin (PRAVACHOL) 80 mg tablet TAKE ONE TABLET BY MOUTH AT BEDTIME    zinc sulfate (ZINCATE) 220 (50) mg capsule     cholecalciferol (VITAMIN D3) 1,000 unit tablet TAKE (2) TABLETS BY MOUTH DAILY.  mirabegron ER (MYRBETRIQ) 25 mg ER tablet Take 50 mg by mouth daily.  QUEtiapine SR (SEROQUEL XR) 300 mg sr tablet Take 300 mg by mouth nightly.  haloperidol (HALDOL) 5 mg tablet Take 10 mg by mouth nightly.  melatonin 3 mg tablet Take  by mouth.  OLANZapine (ZYPREXA) 5 mg tablet Take  by mouth nightly.  LORazepam (ATIVAN) 1 mg tablet Take 1 Tab by mouth daily as needed (procedural anxiolysis).  haloperidol (HALDOL) 10 mg tablet Take  by mouth daily.  triamcinolone acetonide (KENALOG) 0.1 % topical cream Apply  to affected area two (2) times a day. use thin layer as directed x 3-5 days prn    clotrimazole (LOTRIMIN) 1 % topical cream Apply  to affected area two (2) times a day. X 7-10 days    naproxen sodium (NAPROSYN) 220 mg tablet Take 220 mg by mouth two (2) times daily as needed.  PARoxetine (PAXIL) 20 mg tablet Take 10 mg by mouth daily. Indications: should be 10 mg    ibuprofen (MOTRIN) 400 mg tablet Take 1 Tab by mouth every eight (8) hours as needed for Pain.  permethrin (ACTICIN) 5 % topical cream Apply  to affected area now. apply sparingly as directed    sennosides (PERDIEM OVERNIGHT RELIEF) 15 mg Tab Take  by mouth.  divalproex ER (DEPAKOTE ER) 500 mg ER tablet Take 500 mg by mouth two (2) times a day.      No current facility-administered medications for this visit. Neurologic Exam     Mental Status   WD/WN adult in NAD, normal grooming  VSS  A&O, follows commands, needs a lot of redirection to complete commands. She is disinhibited and likes to touch the examiner and be very close face-to-face during the visit. PERRL, nonicteric, reduced blink rate  Face is symmetric, tongue midline  Speech is loud and clear but not very fluent, slight drooling noted  No limb ataxia. No abnl movements.,  But a little bradykinetic  Moving all extemities spontaneously and symmetric  Slightly wide slow non-shuffling gait    [employer] RRR  Lungs nonlabored  Skin is warm and dry         Visit Vitals  Pulse 81   Resp 16   Ht 5' 1.5\" (1.562 m)   Wt 69.9 kg (154 lb)   SpO2 97%   BMI 28.63 kg/m²       Assessment and Plan   Diagnoses and all orders for this visit:    1. Diffuse brain atrophy ([address]    2. 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 62-year-old woman who lives in a group home for psychiatric conditions who overall seems stable. ADLs do not seem changed from a year ago. She does display some mild parkinsonism probably medication induced due to her neuroleptics. I reviewed her medication list.  We will continue Namenda daily as is for now. Continue her daily exercises unsure as required where she lives. I did see zinc on her medication list.  Unclear why she is taking that, I would caution excessive zinc as I could cause a copper myelopathy manifesting and weakness. I will see her annually. I reviewed and decided to continue the current medications.       2 [facility], [facility] [address]  Diplomate KONSTANTIN

## 2020-01-17 RX ORDER — CYCLOBENZAPRINE HCL 10 MG
TABLET ORAL
Qty: 20 TAB | Refills: 1 | Status: SHIPPED | OUTPATIENT
Start: 2020-01-17 | End: 2021-04-14

## 2020-01-24 ENCOUNTER — TELEPHONE (OUTPATIENT)
Dept: INTERNAL MEDICINE CLINIC | Age: 60
End: 2020-01-24

## 2020-01-24 ENCOUNTER — DOCUMENTATION ONLY (OUTPATIENT)
Dept: INTERNAL MEDICINE CLINIC | Age: 60
End: 2020-01-24

## 2020-01-24 NOTE — TELEPHONE ENCOUNTER
Returned Called to beba. Verified name and . Per clinical supervisor she should contact the pts pharm. For them to suggest what cold medication wouldn't interfere with pts medications. beba verbalized understanding.

## 2020-01-24 NOTE — TELEPHONE ENCOUNTER
Pt's new care giver Eugene Magana called stating that the pt woke up last night stating that her throat was hurting. Eugene Magana advised the pt to go back to sleep,pt then woke up this morning with a really bad cough. Eugene Magana state's there is no congestion,no mucus being produced Eugene Magana would like to know what she can give her OTC to help with the cough? Eugene Magana can be reached at # [phone].

## 2020-02-28 ENCOUNTER — OFFICE VISIT (OUTPATIENT)
Dept: RHEUMATOLOGY | Age: 60
End: 2020-02-28

## 2020-02-28 VITALS
OXYGEN SATURATION: 94 % | HEIGHT: 61 IN | HEART RATE: 116 BPM | RESPIRATION RATE: 20 BRPM | TEMPERATURE: 98.3 F | WEIGHT: 153 LBS | SYSTOLIC BLOOD PRESSURE: 119 MMHG | BODY MASS INDEX: 28.89 KG/M2 | DIASTOLIC BLOOD PRESSURE: 83 MMHG

## 2020-02-28 DIAGNOSIS — M75.81 TENDINITIS OF RIGHT ROTATOR CUFF: ICD-10-CM

## 2020-02-28 DIAGNOSIS — R76.8 CYCLIC CITRULLINATED PEPTIDE (CCP) ANTIBODY POSITIVE: Primary | ICD-10-CM

## 2020-02-28 RX ORDER — DONEPEZIL HYDROCHLORIDE 10 MG/1
10 TABLET, FILM COATED ORAL
COMMUNITY
End: 2020-06-30 | Stop reason: CLARIF

## 2020-02-28 RX ORDER — MIRABEGRON 50 MG/1
TABLET, FILM COATED, EXTENDED RELEASE ORAL
COMMUNITY
Start: 2020-02-19 | End: 2021-02-18 | Stop reason: SDUPTHER

## 2020-02-28 RX ORDER — TRIAMCINOLONE ACETONIDE 40 MG/ML
40 INJECTION, SUSPENSION INTRA-ARTICULAR; INTRAMUSCULAR ONCE
Qty: 1 ML | Refills: 0
Start: 2020-02-28 | End: 2020-02-28

## 2020-02-28 NOTE — PROGRESS NOTES
REASON FOR VISIT    This is the initial evaluation for Ms. Trina Buckley a 64 y.o.  female for question of positive CCP. The patient is referred to the [facility] at the request of PCP. HISTORY OF PRESENT ILLNESS     Previous medical records reviewed and summarized: yes    MHAQ:0.25  Pain scale:2/10     This is a 64 y.o. female with schizoaffective disorder. She has pain in her right and left shoulder. She has right shoulder pain with movement. The patient was given flexeril for the pain. Flexeril helped slightly. No other joints hurt. Hands are okay. She has a trigger finger in her right ring finger. REVIEW OF SYSTEMS    A 15 point review of systems was performed and summarized below. The questionnaire was reviewed with the patient and scanned into the patient's medical record.     General:denies recent weight gain, recent weight loss, fatigue, weakness, fever, drenching night sweats  Musculoskeletal:  denies joint pain, joint swelling, morning stiffness, muscle pain  Ears: denies ringing in ears, hearing loss, deafness  Eyes: denies pain, light sensitive, redness, blindness, double vision, blurred vision, excess tearing, dryness, foreign body sensation  Mouth: denies sore tongue, oral ulcers, loss of taste, dryness, increased dental caries  Nose: denies nosebleeds, nasal ulcers  Throat: denies food stuck when swallowing, difficulty with swallowing, hoarseness, pain in jaw while chewing  Neck: denies swollen glands, tender glands  Cardiopulmonary: denies pain in chest with deep breaths, pain in chest when lying down, murmurs, sudden changes in heart beat, wheezing, dry cough, productive cough, shortness of breath at rest, shortness of breath on exertion, coughing of blood  Gastrointestinal: denies nausea, heartburn, stomach pain relieved by food, chronic constipation, chronic diarrhea, blood in stools, black stools  Genitourinary: denies vaginal dryness, pain or burning on urination, blood in urine, cloudy urine, vaginal ulcers, penile ulcers  Hematologic: denies anemia, bleeding tendency, blood clots, bleeding gums  Skin: denies easy bruising, hair loss, rash, rash worsened after sun exposure, hives/urticaria, skin thickening, skin tightness, nodules/bumps, color changes of hands or feet in the cold (Raynaud's)  Neurologic:  denies numbness or tingling in hands, numbness or tingling in feet, muscle weakness  Psychiatric: denies depression, excessive worries, PTSD, Bipolar  Sleep: denies poor sleep (6-8 hours), denies snoring, apnea, daytime somnolence, difficulty falling asleep, difficulty staying asleep     PAST MEDICAL HISTORY    Past Medical History:   Diagnosis Date    Allergic rhinitis     Brain atrophy ([facility] [address] 02/2018    Colon polyps     GERD (gastroesophageal reflux disease)     H/O colonoscopy     History of Papanicolaou smear of cervix 2014    Hypercholesterolemia     hypercholesterolemia    Hypothyroidism     IGT (impaired glucose tolerance)     Kidney tumor (benign)     removed in childhood     Other ill-defined conditions(799.89)     right shoulder bursitis    Psychiatric disorder     schizophrenia    Schizophrenia, schizo-affective ([facility] [address]     Thyroid disease         Past Surgical History:   Procedure Laterality Date    COLONOSCOPY Left 10/1/2019    COL[facility]COPY performed by [facility] Pearl [facility] at [facility] ENDOSCOPY    HX UROLOGICAL      tumor removal as child kidney       FAMILY HISTORY    Family History   Family history unknown: Yes       SOCIAL HISTORY    Social History     Tobacco Use    Smoking status: Current Every Day Smoker     Packs/day: 0.50     Years: 15.00     Pack years: 7.50    Smokeless tobacco: Never Used   Substance Use Topics    Alcohol use: No    Drug use: No       MEDICATIONS    Current Outpatient Medications   Medication Sig Dispense Refill    MYRBETRIQ 50 mg ER tablet TAKE 1 TABLET BY MOUTH EVERY DAY      donepeziL (ARICEPT) 10 mg tablet Take 10 mg by mouth nightly.  triamcinolone acetonide (KENALOG) 40 mg/mL injection 1 mL by IntraBURSal route once for 1 dose. 1 mL 0    pantoprazole (PROTONIX) 40 mg tablet TAKE 1 TABLET BY MOUTH EVERY DAY 30 Tab 5    levothyroxine (SYNTHROID) 50 mcg tablet TAKE 1 TABLET BY MOUTH EVERY MORNING BEFORE BREAKFAST 30 Tab 5    fenofibrate (LOFIBRA) 160 mg tablet TAKE 1 TABLET BY MOUTH EVERY DAY 30 Tab 11    pravastatin (PRAVACHOL) 80 mg tablet TAKE ONE TABLET BY MOUTH AT BEDTIME 30 Tab 11    zinc sulfate (ZINCATE) 220 (50) mg capsule   99    cholecalciferol (VITAMIN D3) 1,000 unit tablet TAKE (2) TABLETS BY MOUTH DAILY. 60 Tab PRN    mirabegron ER (MYRBETRIQ) 25 mg ER tablet Take 50 mg by mouth daily.  QUEtiapine SR (SEROQUEL XR) 300 mg sr tablet Take 300 mg by mouth nightly.  haloperidol (HALDOL) 5 mg tablet Take 10 mg by mouth nightly.  LORazepam (ATIVAN) 1 mg tablet Take 1 Tab by mouth daily as needed (procedural anxiolysis). 3 Tab 0    haloperidol (HALDOL) 10 mg tablet Take  by mouth daily.  triamcinolone acetonide (KENALOG) 0.1 % topical cream Apply  to affected area two (2) times a day. use thin layer as directed x 3-5 days prn 30 g 1    naproxen sodium (NAPROSYN) 220 mg tablet Take 220 mg by mouth two (2) times daily as needed.  PARoxetine (PAXIL) 20 mg tablet Take 10 mg by mouth daily. Indications: should be 10 mg      sennosides (PERDIEM OVERNIGHT RELIEF) 15 mg Tab Take  by mouth.  divalproex ER (DEPAKOTE ER) 500 mg ER tablet Take 500 mg by mouth two (2) times a day.  cyclobenzaprine (FLEXERIL) 10 mg tablet TAKE 1/2 TABLET BY MOUTH THREE TIMES DAILY AS NEEDED MUSCLE SPASMS 20 Tab 1    memantine (NAMENDA) 10 mg tablet Take 1 Tab by mouth daily. 90 Tab 3    melatonin 3 mg tablet Take  by mouth.  OLANZapine (ZYPREXA) 5 mg tablet Take  by mouth nightly.  clotrimazole (LOTRIMIN) 1 % topical cream Apply  to affected area two (2) times a day.  X 7-10 days 45 g 1    ibuprofen (MOTRIN) 400 mg tablet Take 1 Tab by mouth every eight (8) hours as needed for Pain. 30 Tab 1    permethrin (ACTICIN) 5 % topical cream Apply  to affected area now. apply sparingly as directed         ALLERGIES    Allergies   Allergen Reactions    Bee Sting [Sting, Bee] Unknown (comments)     Patient states this is not an allergy      Bees [Hymenoptera Allergenic Extract] Other (comments)    Ragweed Unknown (comments)    Risperidone Other (comments)       PHYSICAL EXAMINATION    Visit Vitals  /83 (BP 1 Location: Right arm, BP Patient Position: Sitting)   Pulse (!) 116   Temp 98.3 °F (36.8 °C) (Oral)   Resp 20   Ht 5' 1\" (1.549 m)   Wt 153 lb (69.4 kg)   SpO2 94%   BMI 28.91 kg/m²     Body mass index is 28.91 kg/m². General: NAD  HEENT: PERRL, anicteric, non-injected sclerae; oropharynx without ulcers, erythema, or exudate. Moist mucous membranes. Lymphatic: No cervical or axillary lymphadenopathy. Cardiovascular: S1, S2,no R/M/G  Pulmonary: CTA b/l. No wheezes/rales/rhonchi. Abdominal: Soft,NTND, + BS. Skin: No rash, nodules, or periungual changes. Neuro: Alert; able to carry normal conversation    Musculoskeletal:   No swollen joints  Right shoulder with pain with active ROM but not pain with passive ROM. + empty can sign    DATA REVIEW    Prior medical records were reviewed and if applicable are summarized as below:    Labs:   11/2019:CCP positive, RF negative, Cbc, cmp unremarkable    Imaging:   N/A    PROCEDURE     Indications:   Symptom relief from Right rotator cuff tendonitis . (02/28/20)     Procedure:   After consent was obtained, and timeout performed, using sterile technique the Right subacromial bursa was prepped using Chlorprep. Local anesthetic used: Ethyl Chloride. The bursa was entered and 0 ml's of fluid was withdrawn. Kenalog 40 mg was mixed with 1% lidocaine 1 ml and injected into the bursa and the needle withdrawn. The procedure was well tolerated. The patient was asked to continue to rest the right shoulder for a few more days before resuming regular activities. It may be more painful for the first 1-2 days. Watch for fever, or increased swelling or persistent pain in the joint. Call or return to clinic as needed if such symptoms occur or there is failure to improve as anticipated. ASSESSMENT AND PLAN    A 64 y.o. female with hx of schizoaffective disorder presents for evaluation of positive CCP antibody. The patient does not appear to have symptoms consistent with clinical rheumatoid arthritis. Her shoulder pain is likely due to rotator cuff tendonitis. Given positive CCP, will evaluate her for RA    # Positive CCP:  - b/l shoulder, hand and foot x-rays    # rotator cuff tendonitis:  - s/p right shoulder steroid injection today    # Schizoaffective disorder:  - on haldol    RTC in 4 weeks    The patient voiced understanding of the aforementioned assessment and plan. Summary of plan was provided in the After Visit Summary patient instructions. I also provided education about MyChart setup and utility. Ms. Daphnie Mesa has a reminder for a \"due or due soon\" health maintenance. I have asked that she contact her primary care provider for follow-up on this health maintenance.     TODAY'S ORDERS    Orders Placed This Encounter    XR HAND RT MIN 3 V    XR HAND LT MIN 3 V    XR FOOT RT MIN 3 V    XR FOOT LT MIN 3 V    XR SHOULDER RT AP/LAT MIN 2 V    XR SHOULDER LT AP/LAT MIN 2 V    TRIAMCINOLONE ACETONIDE INJ    MYRBETRIQ 50 mg ER tablet    donepeziL (ARICEPT) 10 mg tablet    triamcinolone acetonide (KENALOG) 40 mg/mL injection       Future Appointments   Date Time Provider Parmjit Contreras   5/19/2020  9:30 AM Richy Pyle MD [facility] Ema Dove MD    Adult Rheumatology   [facility]  A Part of [address], [address] [place] [address] Road   Phone [phone]  Fax [phone]

## 2020-02-28 NOTE — PATIENT INSTRUCTIONS
Please fill out your [address] you will receive after your visit in the mail or via [address]

## 2020-05-19 DIAGNOSIS — E03.4 HYPOTHYROIDISM DUE TO ACQUIRED ATROPHY OF THYROID: ICD-10-CM

## 2020-05-19 DIAGNOSIS — K21.9 GASTROESOPHAGEAL REFLUX DISEASE WITHOUT ESOPHAGITIS: ICD-10-CM

## 2020-05-19 RX ORDER — LEVOTHYROXINE SODIUM 50 UG/1
50 TABLET ORAL
Qty: 90 TAB | Refills: 1 | Status: SHIPPED | OUTPATIENT
Start: 2020-05-19 | End: 2020-10-28

## 2020-05-19 RX ORDER — PANTOPRAZOLE SODIUM 40 MG/1
40 TABLET, DELAYED RELEASE ORAL DAILY
Qty: 90 TAB | Refills: 1 | Status: SHIPPED | OUTPATIENT
Start: 2020-05-19 | End: 2020-10-28

## 2020-05-19 NOTE — TELEPHONE ENCOUNTER
Last visit 11/19/2019 MD Kristian Sloan   Next appointment 07/13/2020 MD Kristian Sloan   Previous refill encounter(s) 11/12/2019 Protonix #30 with 5 refills, 11/12/2019 Synthroid #30 with 5 refills     Requested Prescriptions     Pending Prescriptions Disp Refills    levothyroxine (SYNTHROID) 50 mcg tablet 90 Tab 0     Sig: Take 1 Tab by mouth Daily (before breakfast).  pantoprazole (PROTONIX) 40 mg tablet 90 Tab 0     Sig: Take 1 Tab by mouth daily.

## 2020-07-01 ENCOUNTER — VIRTUAL VISIT (OUTPATIENT)
Dept: INTERNAL MEDICINE CLINIC | Age: 60
End: 2020-07-01
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
## 2020-07-01 DIAGNOSIS — H53.9 VISION CHANGES: ICD-10-CM

## 2020-07-01 DIAGNOSIS — R73.02 IGT (IMPAIRED GLUCOSE TOLERANCE): ICD-10-CM

## 2020-07-01 DIAGNOSIS — S00.81XA ABRASION OF FACE, INITIAL ENCOUNTER: Primary | ICD-10-CM

## 2020-07-01 DIAGNOSIS — E55.9 VITAMIN D DEFICIENCY: ICD-10-CM

## 2020-07-01 DIAGNOSIS — E78.00 HYPERCHOLESTEROLEMIA: ICD-10-CM

## 2020-07-01 DIAGNOSIS — E03.4 HYPOTHYROIDISM DUE TO ACQUIRED ATROPHY OF THYROID: ICD-10-CM

## 2020-07-01 NOTE — PROGRESS NOTES
Lilian Kasper is a 64 y.o. female who was seen by synchronous (real-time) audio-video technology on 7/1/2020. Consent: Lilian Kasper, who was seen by synchronous (real-time) audio-video technology, and/or her healthcare decision maker, is aware that this patient-initiated, Telehealth encounter on 7/1/2020 is a billable service, with coverage as determined by her insurance carrier. She is aware that she may receive a bill and has provided verbal consent to proceed: Yes. I was in the office while conducting this encounter. Subjective:   Lilian Kasper was seen for Fall (fell on face. scratch on face. 6-24-20.  )      Notes:    Accompanied by care giver    S/p fall    Misjudged the steps and fell     Face abrasion - improved with topical abx ointment    Caregiver concerned maybe pt need an eye exam    O/w at baseline and doing well. No other new complaints. Clinically euthyroid  Taking and tolerating meds    Nursing screenings reviewed by provider at visit. Past medical, Social, and Family history reviewed  Medications reviewed and updated. Allergies   Allergen Reactions    Bee Sting [Sting, Bee] Unknown (comments)     Patient states this is not an allergy      Bees [Hymenoptera Allergenic Extract] Other (comments)    Ragweed Unknown (comments)    Risperidone Other (comments)       Prior to Admission medications    Medication Sig Start Date End Date Taking? Authorizing Provider   levothyroxine (SYNTHROID) 50 mcg tablet Take 1 Tab by mouth Daily (before breakfast). 5/19/20  Yes Garret Euceda MD   pantoprazole (PROTONIX) 40 mg tablet Take 1 Tab by mouth daily. 5/19/20  Yes Garret Euceda MD   cyclobenzaprine (FLEXERIL) 10 mg tablet TAKE 1/2 TABLET BY MOUTH THREE TIMES DAILY AS NEEDED MUSCLE SPASMS 1/17/20  Yes Garret Euceda MD   memantine [facility]) 10 mg tablet Take 1 Tab by mouth daily.  1/3/20  Yes Sundeep Devine DO   fenofibrate (LOFIBRA) 160 mg tablet TAKE 1 TABLET BY MOUTH EVERY DAY 8/9/19  Yes Bam Zaman MD   pravastatin (PRAVACHOL) 80 mg tablet TAKE ONE TABLET BY MOUTH AT BEDTIME 8/9/19  Yes Bam Zaman MD   zinc sulfate (ZINCATE) 220 (50) mg capsule  7/1/19  Yes Provider, Historical   cholecalciferol (VITAMIN D3) 1,000 unit tablet TAKE (2) TABLETS BY MOUTH DAILY. 6/11/19  Yes Darleen Grimaldo MD   QUEtiapine SR (SEROQUEL XR) 300 mg sr tablet Take 300 mg by mouth nightly. Yes Provider, Historical   haloperidol (HALDOL) 5 mg tablet Take 5 mg by mouth every morning. 5/1/18  Yes Provider, Historical   haloperidol (HALDOL) 10 mg tablet Take 10 mg by mouth nightly. 12/1/17  Yes Provider, Historical   ibuprofen (MOTRIN) 400 mg tablet Take 1 Tab by mouth every eight (8) hours as needed for Pain. 1/29/16  Yes Darleen Grimaldo MD   divalproex ER (DEPAKOTE ER) 500 mg ER tablet Take 500 mg by mouth two (2) times a day. Yes Provider, Historical   MYRBETRIQ 50 mg ER tablet TAKE 1 TABLET BY MOUTH EVERY DAY 2/19/20   Provider, Historical   mirabegron ER (MYRBETRIQ) 25 mg ER tablet Take 50 mg by mouth daily. Provider, Historical   melatonin 3 mg tablet Take  by mouth. Provider, Historical   OLANZapine (ZYPREXA) 5 mg tablet Take  by mouth nightly. Provider, Historical   LORazepam (ATIVAN) 1 mg tablet Take 1 Tab by mouth daily as needed (procedural anxiolysis). 2/16/18   Darleen Grimaldo MD   triamcinolone acetonide (KENALOG) 0.1 % topical cream Apply  to affected area two (2) times a day. use thin layer as directed x 3-5 days prn 3/17/17   Darleen Grimaldo MD   clotrimazole (LOTRIMIN) 1 % topical cream Apply  to affected area two (2) times a day. X 7-10 days 3/17/17   Darleen Grimaldo MD   naproxen sodium (NAPROSYN) 220 mg tablet Take 220 mg by mouth two (2) times daily as needed. Provider, Historical   PARoxetine (PAXIL) 20 mg tablet Take 10 mg by mouth daily.  Indications: should be 10 mg    Provider, Historical   permethrin (ACTICIN) 5 % topical cream Apply  to affected area now. apply sparingly as directed    Provider, Historical   sennosides (PERDIEM OVERNIGHT RELIEF) 15 mg Tab Take  by mouth. Provider, Historical         ROS    A complete ROS was performed and negative except as noted in HPI        PHYSICAL EXAMINATION:    Vital Signs: There were no vitals taken for this visit. Constitutional: [x] Appears well-developed and well-nourished [x] No apparent distress      Mental status: [x] Alert and awake  [x] Oriented [x] Able to follow commands       Eyes:   EOM    [x]  Normal      Sclera  [x]  Normal              Discharge [x]  None visible       HENT: [x] Normocephalic, atraumatic    [x] Mouth/Throat: Mucous membranes are moist    External Ears [x] Normal      Neck: [x] No visualized mass     Pulmonary/Chest: [x] Respiratory effort normal   [x] No visualized signs of difficulty breathing or respiratory distress    Musculoskeletal:  [x] Normal range of motion of neck    Neurological:        [x] No Facial Asymmetry (Cranial nerve 7 motor function) (limited exam due to video visit)          [x] No gaze palsy     Skin:        [x] No significant exanthematous lesions or discoloration noted on facial skin             Psychiatric:       [] Normal Affect    Baseline affect and interaction. Other pertinent observable physical exam findings:  None. We discussed the expected course, resolution and complications of the diagnosis(es) in detail. Medication risks, benefits, costs, interactions, and alternatives were discussed as indicated. I advised her to contact the office if her condition worsens, changes or fails to improve as anticipated. She expressed understanding with the diagnosis(es) and plan. Jaelyn Segura is a 64 y.o. female who was evaluated by a video visit encounter for concerns as above. Patient identification was verified prior to start of the visit. A caregiver was present when appropriate.  Due to this being a TeleHealth encounter (During IBJIY-01 public health emergency), evaluation of the following organ systems was limited: Vitals/Constitutional/EENT/Resp/CV/GI//MS/Neuro/Skin/Heme-Lymph-Imm. Pursuant to the emergency declaration under the [facility] [place] [address] [facility] authority and the [employer] and [employer] Act, this Virtual  Visit was conducted, with patient's (and/or legal guardian's) consent, to reduce the patient's risk of exposure to COVID-19 and provide necessary medical care. Services were provided through a video synchronous discussion virtually to substitute for in-person clinic visit. Assessment & Plan:   Diagnoses and all orders for this visit:      ICD-10-CM ICD-9-CM    1. Abrasion of face, initial encounter S00.81XA 910.0    2. Fall, initial encounter [address] XXXA E888.9 REFERRAL TO OPHTHALMOLOGY   3. Vision changes H53.9 368.9 REFERRAL TO OPHTHALMOLOGY   4. Hypercholesterolemia E78.00 272.0 CBC WITH AUTOMATED DIFF      CK      LIPID PANEL      METABOLIC PANEL, COMPREHENSIVE   5. Hypothyroidism due to acquired atrophy of thyroid E03.4 244.8 T4, FREE     246.8 TSH 3RD GENERATION   6. Vitamin D deficiency E55.9 268.9 VITAMIN D, 25 HYDROXY   7. IGT (impaired glucose tolerance) R73.02 790.22 HEMOGLOBIN A1C WITH EAG     Follow-up and Dispositions    · Return in about 6 months (around 1/1/2021), or if symptoms worsen or fail to improve, for thyroid, cholesterol - IO.       results and schedule of future studies reviewed with patient  reviewed diet, exercise and weight   cardiovascular risk and specific lipid/LDL goals reviewed  reviewed medications and side effects in detail    Agree to eye exam  Continue other care. AVS:  [x]  Sent to patient as "[employer]t message after visit. []  Mailed to patient after visit. []  Not sent to patient after visit.

## 2020-07-01 NOTE — PROGRESS NOTES
[phone]    Presents w/ caregiver    Please update med list     Chief Complaint   Patient presents with    Fall     1. Have you been to the ER, urgent care clinic since your last visit? Hospitalized since your last visit? Yes no    2. Have you seen or consulted any other health care providers outside of the [address] [place] [address] since your last visit? Include any pap smears or colon screening. No     Health Maintenance Due   Topic Date Due    Pneumococcal 0-64 years (1 of 1 - PPSV23) [birth year]/1968    Shingrix Vaccine Age 50> (1 of 2) 03/03/2012    PAP AKA CERVICAL CYTOLOGY  04/28/2020    Medicare Yearly Exam  07/30/2020     Recent Travel Screening and Travel History documentation     Travel Screening       Question Response     In the last month, have you been in contact with someone who was confirmed or suspected to have Coronavirus / COVID-19? No / Unsure     Do you have any of the following symptoms? None of these     Have you traveled internationally in the last month?  No      Travel History   Travel since 06/01/20     No documented travel since 06/01/20        3 most recent PHQ Screens 7/1/2020   PHQ Not Done Active Diagnosis of Depression or Bipolar Disorder   Little interest or pleasure in doing things -   Feeling down, depressed, irritable, or hopeless -   Total Score PHQ 2 -

## 2020-08-21 DIAGNOSIS — E55.9 VITAMIN D DEFICIENCY: ICD-10-CM

## 2020-08-21 DIAGNOSIS — E78.00 HYPERCHOLESTEROLEMIA: ICD-10-CM

## 2020-08-21 RX ORDER — FENOFIBRATE 160 MG/1
160 TABLET ORAL DAILY
Qty: 30 TAB | Refills: 11 | Status: SHIPPED | OUTPATIENT
Start: 2020-08-21 | End: 2021-07-23

## 2020-08-21 RX ORDER — PRAVASTATIN SODIUM 80 MG/1
80 TABLET ORAL
Qty: 30 TAB | Refills: 11 | Status: SHIPPED | OUTPATIENT
Start: 2020-08-21 | End: 2021-07-23

## 2020-08-21 RX ORDER — MELATONIN
2000 DAILY
Qty: 60 TAB | Status: SHIPPED | OUTPATIENT
Start: 2020-08-21 | End: 2021-08-24

## 2020-08-21 NOTE — TELEPHONE ENCOUNTER
[address] left a voice message following up on refill request from 08/17/2020 for refills. Pharmacy requesting a response before 4 pm on Friday 08/21/2020 so patients prescriptions can be packaged up and sent to patient so patient will not miss next dose. Please review. Last visit 07/01/2020 Virtual visit MD Harmony Wang   Next appointment 12/30/2020 MD Harmony Wang   Previous refill encounter(s)   06/11/2019 Vitamin D3 #60 with prn refills,  08/09/2019 Patrick Callow #30 with 11 refills,  08/09/2019 Pravachol #30 with 11 refills. Requested Prescriptions     Pending Prescriptions Disp Refills    fenofibrate (LOFIBRA) 160 mg tablet 30 Tab 11     Sig: Take 1 Tab by mouth daily.  pravastatin (PRAVACHOL) 80 mg tablet 30 Tab 11     Sig: Take 1 Tab by mouth nightly.  cholecalciferol (VITAMIN D3) (1000 Units /25 mcg) tablet 60 Tab PRN     Sig: Take 2 Tabs by mouth daily.

## 2020-10-10 ENCOUNTER — APPOINTMENT (OUTPATIENT)
Dept: GENERAL RADIOLOGY | Age: 60
End: 2020-10-10
Attending: PHYSICIAN ASSISTANT
Payer: MEDICARE

## 2020-10-10 ENCOUNTER — APPOINTMENT (OUTPATIENT)
Dept: CT IMAGING | Age: 60
End: 2020-10-10
Attending: PHYSICIAN ASSISTANT
Payer: MEDICARE

## 2020-10-10 ENCOUNTER — HOSPITAL ENCOUNTER (EMERGENCY)
Age: 60
Discharge: HOME | End: 2020-10-11
Attending: EMERGENCY MEDICINE
Payer: MEDICARE

## 2020-10-10 DIAGNOSIS — S09.90XA CLOSED HEAD INJURY, INITIAL ENCOUNTER: ICD-10-CM

## 2020-10-10 DIAGNOSIS — S00.11XA PERIORBITAL ECCHYMOSIS OF RIGHT EYE, INITIAL ENCOUNTER: ICD-10-CM

## 2020-10-10 DIAGNOSIS — S80.02XA CONTUSION OF LEFT KNEE, INITIAL ENCOUNTER: ICD-10-CM

## 2020-10-10 DIAGNOSIS — S40.011A CONTUSION OF RIGHT SHOULDER, INITIAL ENCOUNTER: ICD-10-CM
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
## 2020-10-10 PROCEDURE — 99284 EMERGENCY DEPT VISIT MOD MDM: CPT

## 2020-10-10 PROCEDURE — 73030 X-RAY EXAM OF SHOULDER: CPT

## 2020-10-10 PROCEDURE — 70450 CT HEAD/BRAIN W/O DYE: CPT

## 2020-10-11 ENCOUNTER — APPOINTMENT (OUTPATIENT)
Dept: GENERAL RADIOLOGY | Age: 60
End: 2020-10-11
Attending: EMERGENCY MEDICINE
Payer: MEDICARE

## 2020-10-11 VITALS
WEIGHT: 161.6 LBS | SYSTOLIC BLOOD PRESSURE: 124 MMHG | HEART RATE: 110 BPM | DIASTOLIC BLOOD PRESSURE: 87 MMHG | RESPIRATION RATE: 16 BRPM | TEMPERATURE: 97.6 F | OXYGEN SATURATION: 94 %

## 2020-10-11 PROCEDURE — 73562 X-RAY EXAM OF KNEE 3: CPT

## 2020-10-11 PROCEDURE — 74011250637 HC RX REV CODE- 250/637: Performed by: EMERGENCY MEDICINE

## 2020-10-11 RX ORDER — IBUPROFEN 600 MG/1
600 TABLET ORAL
Qty: 20 TAB | Refills: 0 | Status: SHIPPED | OUTPATIENT
Start: 2020-10-11

## 2020-10-11 RX ORDER — IBUPROFEN 600 MG/1
600 TABLET ORAL
Status: COMPLETED | OUTPATIENT
Start: 2020-10-11 | End: 2020-10-11

## 2020-10-11 RX ADMIN — IBUPROFEN 600 MG: 600 TABLET, FILM COATED ORAL at 01:35

## 2020-10-11 NOTE — ED NOTES
Pt presents to ed with caregiver     Pt caregiver states that she went to check on the pt as she was trying to get into the shower and pt's foot got stuck and she fell and hit her head on the right side as well as her right arm and left knee/ankle. Pt reports mild blurry vision while looking upwards. Pt denies loc, nausea, vomiting, abdominal pain, dizziness, ha.     Pt caregiver states she has not taken anything for pain. 1:38 AM: I have reviewed discharge instructions with the patient. The patient verbalized understanding.

## 2020-10-11 NOTE — ED PROVIDER NOTES
EMERGENCY DEPARTMENT HISTORY AND PHYSICAL EXAM      Date: 10/10/2020  Patient Name: Rodger Done    History of Presenting Illness     Chief Complaint   Patient presents with   [facility] Fall     pt reports slip and fall in bathroom. c/o pain in head and right shoulder. swelling to right eyebrow. denies LOC.  Facial Injury    Shoulder Injury       History Provided By: Patient    HPI: Batsheva Ramirez, 64 y.o. female with PMHx as noted below presents the emergency department for evaluation after fall. Per patient, she slipped in the shower falling hitting her head, right shoulder and left knee. Since then she has been having pain above her right eye, right shoulder and left knee that she describes as dull aching pain that is worse with movement and ambulation. There was no loss of consciousness or syncopal episode. Denied any chest pain or shortness of breath. PCP: Lovina Hodgkin, MD    Current Outpatient Medications   Medication Sig Dispense Refill    ibuprofen (MOTRIN) 600 mg tablet Take 1 Tab by mouth every six (6) hours as needed for Pain. 20 Tab 0       Past History     Past Medical History:  History reviewed, no pertinent past medical history    Social History:  No heavy alcohol or illicit drug use  Allergies:  No Known Allergies      Review of Systems   Review of Systems  Constitutional: Negative for fever, chills, and fatigue. HENT: Negative for congestion, sore throat, rhinorrhea, sneezing and neck stiffness   Eyes: Negative for discharge and redness. Respiratory: Negative for  shortness of breath, wheezing   Cardiovascular: Negative for chest pain, palpitations   Gastrointestinal: Negative for nausea, vomiting, abdominal pain, constipation, diarrhea and blood in stool. Genitourinary: Negative for dysuria, hematuria, flank pain, decreased urine volume, discharge,   Musculoskeletal: Positive for shoulder and knee pain  Skin: Negative for rash or lesions .    Neurological: Negative weakness, light-headedness, numbness. Positive headaches. Physical Exam   Physical Exam    GENERAL: alert and oriented, no acute distress  EYES: PEERL, No injection, discharge or icterus. Extraocular movements intact  ENT: Mucous membranes pink and moist.  There is ecchymosis in the right superior orbital ridge. Dentition intact  NECK: Supple, no midline tenderness  LUNGS: Airway patent. Non-labored respirations. Breath sounds clear with good air entry bilaterally. HEART: Regular rate and rhythm. No peripheral edema  ABDOMEN: Non-distended and non-tender, without guarding or rebound. SKIN:  warm, dry  MSK/EXTREMITIES: There is ecchymoses and tenderness noted over the right shoulder and left knee. She does have full range of motion at the joint  NEUROLOGICAL: Alert, oriented      Diagnostic Study Results     Labs -   No results found for this or any previous visit (from the past 12 hour(s)). Radiologic Studies -   XR KNEE LT 3 V   Final Result   IMPRESSION: No acute abnormality. Mild osteoarthritis. XR SHOULDER RT AP/LAT MIN 2 V   Final Result   IMPRESSION: No acute abnormality. Osteoarthritis and osteopenia. CT HEAD WO CONT   Final Result   IMPRESSION: No acute findings. CT Results  (Last 48 hours)               10/10/20 2157  CT HEAD WO CONT Final result    Impression:  IMPRESSION: No acute findings. Narrative:  EXAM: CT HEAD WO CONT       INDICATION: fall, forehead hematoma       COMPARISON: None. CONTRAST: None. TECHNIQUE: Unenhanced CT of the head was performed using 5 mm images. Brain and   bone windows were generated. Coronal and sagittal reformats. CT dose reduction   was achieved through use of a standardized protocol tailored for this   examination and automatic exposure control for dose modulation. FINDINGS:   The ventricles and sulci are normal in size, shape and configuration. . There is   no significant white matter disease.  There is no intracranial hemorrhage,   extra-axial collection, or mass effect. The basilar cisterns are open. No CT   evidence of acute infarct. The bone windows demonstrate no abnormalities. The visualized portions of the   paranasal sinuses and mastoid air cells are clear. CXR Results  (Last 48 hours)    None            Medical Decision Making     IYazmin MD am the first provider for this patient and am the attending of record for this patient encounter. I reviewed the vital signs, available nursing notes, past medical history, past surgical history, family history and social history. Vital Signs-Reviewed the patient's vital signs. Patient Vitals for the past 12 hrs:   Temp Pulse Resp BP SpO2   10/11/20 0200 97.6 °F (36.4 °C)   124/87 94 %   10/11/20 0145    (!) 129/90 94 %   10/11/20 0115    123/89 95 %   10/11/20 0100    (!) 139/99 96 %   10/10/20 2134 98.4 °F (36.9 °C) (!) 110 16 (!) 150/90 96 %       Pulse Oximetry Analysis - 94% on RA        Records Reviewed: Nursing Notes and Old Medical Records    Provider Notes (Medical Decision Making): On presentation, the patient is well appearing, in no acute distress with normal vital signs (was not tachycardic on my evaluation). Based on my history and exam the differential diagnosis for this patient includes intracranial hemorrhage, concussion, shoulder fracture, shoulder dislocation, knee dislocation, knee fracture. Reviewed CT scan of the head which showed no acute pathology. X-rays of the shoulder and knee also showed no acute fracture or dislocation. Based on history this sounds to be a purely mechanical fall so otherwise felt stable for discharge. Did  on possible post concussive symptoms and when to seek urgent follow-up. ED Course:   Initial assessment performed. The patients presenting problems have been discussed, and they are in agreement with the care plan formulated and outlined with them.   I have encouraged them to ask questions as they arise throughout their visit. PROGRESS  Batsheva Ramirez's  results have been reviewed with her. She has been counseled regarding her diagnosis. She verbally conveys understanding and agreement of the signs, symptoms, diagnosis, treatment and prognosis and additionally agrees to follow up as recommended with Dr. Yanique Bentley MD in 24 - 48 hours. She also agrees with the care-plan and conveys that all of her questions have been answered. I have also put together some discharge instructions for her that include: 1) educational information regarding their diagnosis, 2) how to care for their diagnosis at home, as well a 3) list of reasons why they would want to return to the ED prior to their follow-up appointment, should their condition change. Disposition:  home    PLAN:  1. Discharge Medication List as of 10/11/2020  1:59 AM      START taking these medications    Details   ibuprofen (MOTRIN) 600 mg tablet Take 1 Tab by mouth every six (6) hours as needed for Pain., Print, Disp-20 Tab,R-0           2. Follow-up Information     Follow up With Specialties Details Why Thierno Mclaughlin MD Pediatric Medicine, Internal Medicine Schedule an appointment as soon as possible for a visit in 2 days  [address] [facility]  [phone]      [facility] EMERGENCY DEPT Emergency Medicine  If symptoms worsen [address]  [address] [facility]  [phone]        Return to ED if worse     Diagnosis     Clinical Impression:   1. Fall, initial encounter    2. Closed head injury, initial encounter    3. Periorbital ecchymosis of right eye, initial encounter    4. Contusion of right shoulder, initial encounter    5. Contusion of left knee, initial encounter        Please note that this dictation was completed with Dragon, computer voice recognition software.   Quite often unanticipated grammatical, syntax, homophones, and other interpretive errors are inadvertently transcribed by the computer software. Please disregard these errors. Additionally, please excuse any errors that have escaped final proofreading.

## 2020-10-17 ENCOUNTER — DOCUMENTATION ONLY (OUTPATIENT)
Dept: INTERNAL MEDICINE CLINIC | Age: 60
End: 2020-10-17

## 2020-10-17 NOTE — PROGRESS NOTES
[facility] Delivered order # [id number] signed by provider 9/15/20, faxed 9/16/20 to [phone], order and confirmation placed in bin for central scan.

## 2020-10-20 ENCOUNTER — TELEPHONE (OUTPATIENT)
Dept: INTERNAL MEDICINE CLINIC | Age: 60
End: 2020-10-20

## 2020-10-20 NOTE — TELEPHONE ENCOUNTER
Caller's first and last name: Oskar Eaton. Caregiver     Reason for call: re: copy of lab work to get labs work done     [facility] required yes/no and why: yes     Best contact number(s): [phone]     Details to clarify the request: fax: [phone]; Also requesting an appt for an annual cpe       Tried to return call - No Answer / Mailbox was full. If caregiver calls back, please schedule CPE. Also, does pt want to get labs done on office or is pt going elsewhere?

## 2020-10-28 DIAGNOSIS — E03.4 HYPOTHYROIDISM DUE TO ACQUIRED ATROPHY OF THYROID: ICD-10-CM

## 2020-10-28 DIAGNOSIS — K21.9 GASTROESOPHAGEAL REFLUX DISEASE WITHOUT ESOPHAGITIS: ICD-10-CM

## 2020-10-28 RX ORDER — PANTOPRAZOLE SODIUM 40 MG/1
TABLET, DELAYED RELEASE ORAL
Qty: 90 TAB | Refills: 1 | Status: SHIPPED | OUTPATIENT
Start: 2020-10-28 | End: 2021-04-24

## 2020-10-28 RX ORDER — LEVOTHYROXINE SODIUM 50 UG/1
TABLET ORAL
Qty: 90 TAB | Refills: 1 | Status: SHIPPED | OUTPATIENT
Start: 2020-10-28 | End: 2021-04-24

## 2020-10-31 LAB
25(OH)D3+25(OH)D2 SERPL-MCNC: 30.3 NG/ML (ref 30–100)
ALBUMIN SERPL-MCNC: 4 G/DL (ref 3.8–4.9)
ALBUMIN/GLOB SERPL: 1.9 {RATIO} (ref 1.2–2.2)
ALP SERPL-CCNC: 86 IU/L (ref 39–117)
ALT SERPL-CCNC: 16 IU/L (ref 0–32)
AST SERPL-CCNC: 45 IU/L (ref 0–40)
BASOPHILS # BLD AUTO: 0 X10E3/UL (ref 0–0.2)
BASOPHILS NFR BLD AUTO: 1 %
BILIRUB SERPL-MCNC: 0.5 MG/DL (ref 0–1.2)
BUN SERPL-MCNC: 15 MG/DL (ref 6–24)
BUN/CREAT SERPL: 18 (ref 9–23)
CALCIUM SERPL-MCNC: 9.7 MG/DL (ref 8.7–10.2)
CHLORIDE SERPL-SCNC: 102 MMOL/L (ref 96–106)
CHOLEST SERPL-MCNC: 157 MG/DL (ref 100–199)
CK SERPL-CCNC: 65 U/L (ref 32–182)
CO2 SERPL-SCNC: 25 MMOL/L (ref 20–29)
CREAT SERPL-MCNC: 0.82 MG/DL (ref 0.57–1)
EOSINOPHIL # BLD AUTO: 0 X10E3/UL (ref 0–0.4)
EOSINOPHIL NFR BLD AUTO: 1 %
ERYTHROCYTE [DISTWIDTH] IN BLOOD BY AUTOMATED COUNT: 13.3 % (ref 11.7–15.4)
EST. AVERAGE GLUCOSE BLD GHB EST-MCNC: 117 MG/DL
GLOBULIN SER CALC-MCNC: 2.1 G/DL (ref 1.5–4.5)
GLUCOSE SERPL-MCNC: 85 MG/DL (ref 65–99)
HBA1C MFR BLD: 5.7 % (ref 4.8–5.6)
HCT VFR BLD AUTO: 37.5 % (ref 34–46.6)
HDLC SERPL-MCNC: 40 MG/DL
HGB BLD-MCNC: 12.5 G/DL (ref 11.1–15.9)
IMM GRANULOCYTES # BLD AUTO: 0 X10E3/UL (ref 0–0.1)
IMM GRANULOCYTES NFR BLD AUTO: 1 %
LDLC SERPL CALC-MCNC: 84 MG/DL (ref 0–99)
LYMPHOCYTES # BLD AUTO: 1.9 X10E3/UL (ref 0.7–3.1)
LYMPHOCYTES NFR BLD AUTO: 46 %
MCH RBC QN AUTO: 33.1 PG (ref 26.6–33)
MCHC RBC AUTO-ENTMCNC: 33.3 G/DL (ref 31.5–35.7)
MCV RBC AUTO: 99 FL (ref 79–97)
MONOCYTES # BLD AUTO: 0.5 X10E3/UL (ref 0.1–0.9)
MONOCYTES NFR BLD AUTO: 11 %
NEUTROPHILS # BLD AUTO: 1.7 X10E3/UL (ref 1.4–7)
NEUTROPHILS NFR BLD AUTO: 40 %
PLATELET # BLD AUTO: 274 X10E3/UL (ref 150–450)
POTASSIUM SERPL-SCNC: 5.3 MMOL/L (ref 3.5–5.2)
PROT SERPL-MCNC: 6.1 G/DL (ref 6–8.5)
RBC # BLD AUTO: 3.78 X10E6/UL (ref 3.77–5.28)
SODIUM SERPL-SCNC: 137 MMOL/L (ref 134–144)
T4 FREE SERPL-MCNC: 1.12 NG/DL (ref 0.82–1.77)
TRIGL SERPL-MCNC: 194 MG/DL (ref 0–149)
TSH SERPL DL<=0.005 MIU/L-ACNC: 2.41 UIU/ML (ref 0.45–4.5)
VLDLC SERPL CALC-MCNC: 33 MG/DL (ref 5–40)
WBC # BLD AUTO: 4.1 X10E3/UL (ref 3.4–10.8)

## 2020-12-30 ENCOUNTER — OFFICE VISIT (OUTPATIENT)
Dept: INTERNAL MEDICINE CLINIC | Age: 60
End: 2020-12-30
Payer: MEDICARE

## 2020-12-30 VITALS
DIASTOLIC BLOOD PRESSURE: 78 MMHG | RESPIRATION RATE: 16 BRPM | BODY MASS INDEX: 30.02 KG/M2 | SYSTOLIC BLOOD PRESSURE: 109 MMHG | TEMPERATURE: 97.3 F | HEART RATE: 97 BPM | HEIGHT: 61 IN | WEIGHT: 159 LBS | OXYGEN SATURATION: 96 %

## 2020-12-30 DIAGNOSIS — E03.4 HYPOTHYROIDISM DUE TO ACQUIRED ATROPHY OF THYROID: ICD-10-CM

## 2020-12-30 DIAGNOSIS — R46.89 COMPULSIVE SCRATCHING BEHAVIOR: ICD-10-CM

## 2020-12-30 DIAGNOSIS — Z23 NEEDS FLU SHOT: ICD-10-CM

## 2020-12-30 DIAGNOSIS — R73.02 IGT (IMPAIRED GLUCOSE TOLERANCE): ICD-10-CM

## 2020-12-30 DIAGNOSIS — E55.9 VITAMIN D DEFICIENCY: ICD-10-CM

## 2020-12-30 DIAGNOSIS — K21.9 GASTROESOPHAGEAL REFLUX DISEASE WITHOUT ESOPHAGITIS: ICD-10-CM

## 2020-12-30 DIAGNOSIS — F25.9 SCHIZOPHRENIA, SCHIZO-AFFECTIVE (HCC): ICD-10-CM

## 2020-12-30 DIAGNOSIS — Z01.419 ENCOUNTER FOR GYNECOLOGICAL EXAMINATION WITHOUT ABNORMAL FINDING: ICD-10-CM

## 2020-12-30 DIAGNOSIS — J30.9 ALLERGIC RHINITIS, UNSPECIFIED SEASONALITY, UNSPECIFIED TRIGGER: ICD-10-CM

## 2020-12-30 DIAGNOSIS — E87.5 HYPERKALEMIA: ICD-10-CM

## 2020-12-30 DIAGNOSIS — E78.00 HYPERCHOLESTEROLEMIA: ICD-10-CM

## 2020-12-30 DIAGNOSIS — Z12.31 ENCOUNTER FOR SCREENING MAMMOGRAM FOR BREAST CANCER: ICD-10-CM

## 2020-12-30 DIAGNOSIS — Z00.00 MEDICARE ANNUAL WELLNESS VISIT, SUBSEQUENT: Primary | ICD-10-CM

## 2020-12-30 LAB
ANION GAP SERPL CALC-SCNC: 5 MMOL/L (ref 5–15)
BUN SERPL-MCNC: 17 MG/DL (ref 6–20)
BUN/CREAT SERPL: 23 (ref 12–20)
CALCIUM SERPL-MCNC: 9.7 MG/DL (ref 8.5–10.1)
CHLORIDE SERPL-SCNC: 101 MMOL/L (ref 97–108)
CO2 SERPL-SCNC: 29 MMOL/L (ref 21–32)
CREAT SERPL-MCNC: 0.75 MG/DL (ref 0.55–1.02)
GLUCOSE SERPL-MCNC: 85 MG/DL (ref 65–100)
POTASSIUM SERPL-SCNC: 5 MMOL/L (ref 3.5–5.1)
SODIUM SERPL-SCNC: 135 MMOL/L (ref 136–145)

## 2020-12-30 PROCEDURE — G0008 ADMIN INFLUENZA VIRUS VAC: HCPCS | Performed by: INTERNAL MEDICINE

## 2020-12-30 PROCEDURE — G8417 CALC BMI ABV UP PARAM F/U: HCPCS | Performed by: INTERNAL MEDICINE

## 2020-12-30 PROCEDURE — G9899 SCRN MAM PERF RSLTS DOC: HCPCS | Performed by: INTERNAL MEDICINE

## 2020-12-30 PROCEDURE — G8510 SCR DEP NEG, NO PLAN REQD: HCPCS | Performed by: INTERNAL MEDICINE

## 2020-12-30 PROCEDURE — 99214 OFFICE O/P EST MOD 30 MIN: CPT | Performed by: INTERNAL MEDICINE

## 2020-12-30 PROCEDURE — G0439 PPPS, SUBSEQ VISIT: HCPCS | Performed by: INTERNAL MEDICINE

## 2020-12-30 PROCEDURE — 3017F COLORECTAL CA SCREEN DOC REV: CPT | Performed by: INTERNAL MEDICINE

## 2020-12-30 PROCEDURE — 90686 IIV4 VACC NO PRSV 0.5 ML IM: CPT | Performed by: INTERNAL MEDICINE

## 2020-12-30 PROCEDURE — G8427 DOCREV CUR MEDS BY ELIG CLIN: HCPCS | Performed by: INTERNAL MEDICINE

## 2020-12-30 NOTE — PROGRESS NOTES
This is the Subsequent Medicare Annual Wellness Exam, performed 12 months or more after the Initial AWV or the last Subsequent AWV    I have reviewed the patient's medical history in detail and updated the computerized patient record. Depression Risk Factor Screening:     3 most recent PHQ Screens 12/30/2020   PHQ Not Done -   Little interest or pleasure in doing things Not at all   Feeling down, depressed, irritable, or hopeless Not at all   Total Score PHQ 2 0       Alcohol Risk Screen    Do you average more than 1 drink per night or more than 7 drinks a week:  No    On any one occasion in the past three months have you have had more than 3 drinks containing alcohol:  No        Functional Ability and Level of Safety:    Hearing: Hearing is good. Activities of Daily Living: The home contains: no safety equipment. Patient needs help with:  transportation, shopping, preparing meals, housework, managing medications and managing money      Ambulation: with no difficulty     Fall Risk:  Fall Risk Assessment, last 12 mths 12/30/2020   Able to walk? Yes   Fall in past 12 months? 1   Do you feel unsteady? 0   Are you worried about falling 0   Number of falls in past 12 months 1   Fall with injury? 1      Abuse Screen:  Patient is not abused       Cognitive Screening    Has your family/caregiver stated any concerns about your memory: no         Assessment/Plan   Education and counseling provided:  Are appropriate based on today's review and evaluation    ICD-10-CM ICD-9-CM    1. Medicare annual wellness visit, subsequent  Z00.00 V70.0    2. Needs flu shot  Z23 V04.81 INFLUENZA VIRUS VAC QUAD,SPLIT,PRESV FREE SYRINGE IM   3. Encounter for gynecological examination without abnormal finding  Z01.419 V72.31 REFERRAL TO OBSTETRICS AND GYNECOLOGY   4. Hyperkalemia  H53.3 188.3 METABOLIC PANEL, BASIC      METABOLIC PANEL, BASIC   5. Schizophrenia, schizo-affective ([facility] [address]  F25.9 295.70    6.  Encounter for screening mammogram for breast cancer  Z12.31 V76.12 [facility] MAMMO BI SCREENING INCL CAD   7. Hypercholesterolemia  E78.00 272.0    8. Hypothyroidism due to acquired atrophy of thyroid  E03.4 244.8      246.8    9. Gastroesophageal reflux disease without esophagitis  K21.9 530.81    10. IGT (impaired glucose tolerance)  R73.02 790.22    11. Vitamin D deficiency  E55.9 268.9    12. Allergic rhinitis, unspecified seasonality, unspecified trigger  J30.9 477.9    13. Compulsive scratching behavior  R46.89 V40.39      Follow-up and Dispositions    · Return in about 6 months (around 6/30/2021), or if symptoms worsen or fail to improve, for cholesterol, thyroid. results and schedule of future studies reviewed with patient  reviewed diet, exercise and weight   cardiovascular risk and specific lipid/LDL goals reviewed  reviewed medications and side effects in detail     Mammogram  GYN   Flu shot    Health Maintenance Due     Health Maintenance Due   Topic Date Due    Pneumococcal 0-64 years (1 of 1 - PPSV23) [birth year]/03/1968    Shingrix Vaccine Age 50> (1 of 2) 03/03/2012    PAP AKA CERVICAL CYTOLOGY  04/28/2020    Flu Vaccine (1) 09/01/2020       Patient Care Team   Patient Care Team:  Essence Ford MD as PCP - General (Pediatric Medicine)  Essence Ford MD as PCP - [facility] Provider  Blayne Trejo MD (Family Medicine)    History     Patient Active Problem List   Diagnosis Code    Schizophrenia, schizo-affective ([facility] [address] F25.9    Hypothyroidism E03.9    GERD (gastroesophageal reflux disease) K21.9    Kidney tumor (benign) D30.00    Allergic rhinitis J30.9    Hypercholesterolemia E78.00    IGT (impaired glucose tolerance) R73.02    Vitamin D deficiency E55.9    Eczema L30.9    Contusion of abdominal wall S30. 1XXA    Contusion of right breast J72.91GQ    Acute folliculitis B54.5    Urinary incontinence R32    Dysphagia R13.10     Past Medical History:   Diagnosis Date    Allergic rhinitis     Brain atrophy ([facility] [address] 02/2018    Colon polyps     GERD (gastroesophageal reflux disease)     H/O colonoscopy     History of Papanicolaou smear of cervix 2014    Hypercholesterolemia     hypercholesterolemia    Hypothyroidism     IGT (impaired glucose tolerance)     Kidney tumor (benign)     removed in childhood     Other ill-defined conditions(799.89)     right shoulder bursitis    Psychiatric disorder     schizophrenia    Schizophrenia, schizo-affective ([facility] [address]     Thyroid disease       Past Surgical History:   Procedure Laterality Date    COLONOSCOPY Left 10/1/2019    COLONOSCOPY performed by Melissa Belcher MD at [facility] ENDOSCOPY    HX UROLOGICAL      tumor removal as child kidney     Current Outpatient Medications   Medication Sig Dispense Refill    pantoprazole (PROTONIX) 40 mg tablet TAKE 1 TABLET BY MOUTH EVERY DAY 90 Tab 1    levothyroxine (SYNTHROID) 50 mcg tablet TAKE 1 TABLET BY MOUTH EVERY MORNING BEFORE breakfast 90 Tab 1    fenofibrate (LOFIBRA) 160 mg tablet Take 1 Tab by mouth daily. 30 Tab 11    pravastatin (PRAVACHOL) 80 mg tablet Take 1 Tab by mouth nightly. 30 Tab 11    cholecalciferol (VITAMIN D3) (1000 Units /25 mcg) tablet Take 2 Tabs by mouth daily. 60 Tab PRN    MYRBETRIQ 50 mg ER tablet TAKE 1 TABLET BY MOUTH EVERY DAY      cyclobenzaprine (FLEXERIL) 10 mg tablet TAKE 1/2 TABLET BY MOUTH THREE TIMES DAILY AS NEEDED MUSCLE SPASMS 20 Tab 1    memantine (NAMENDA) 10 mg tablet Take 1 Tab by mouth daily. 90 Tab 3    zinc sulfate (ZINCATE) 220 (50) mg capsule   99    mirabegron ER (MYRBETRIQ) 25 mg ER tablet Take 50 mg by mouth daily.  QUEtiapine SR (SEROQUEL XR) 300 mg sr tablet Take 300 mg by mouth nightly.  haloperidol (HALDOL) 5 mg tablet Take 5 mg by mouth every morning.  OLANZapine (ZYPREXA) 5 mg tablet Take  by mouth nightly.  LORazepam (ATIVAN) 1 mg tablet Take 1 Tab by mouth daily as needed (procedural anxiolysis).  3 Tab 0    haloperidol (HALDOL) 10 mg tablet Take 10 mg by mouth nightly.  naproxen sodium (NAPROSYN) 220 mg tablet Take 220 mg by mouth two (2) times daily as needed.  PARoxetine (PAXIL) 20 mg tablet Take 10 mg by mouth daily. Indications: should be 10 mg      divalproex ER (DEPAKOTE ER) 500 mg ER tablet Take 500 mg by mouth two (2) times a day.  melatonin 3 mg tablet Take  by mouth.  triamcinolone acetonide (KENALOG) 0.1 % topical cream Apply  to affected area two (2) times a day. use thin layer as directed x 3-5 days prn 30 g 1    clotrimazole (LOTRIMIN) 1 % topical cream Apply  to affected area two (2) times a day. X 7-10 days 45 g 1    ibuprofen (MOTRIN) 400 mg tablet Take 1 Tab by mouth every eight (8) hours as needed for Pain. 30 Tab 1    permethrin (ACTICIN) 5 % topical cream Apply  to affected area now. apply sparingly as directed      sennosides (PERDIEM OVERNIGHT RELIEF) 15 mg Tab Take  by mouth. Allergies   Allergen Reactions    Bee Sting [Sting, Bee] Unknown (comments)     Patient states this is not an allergy      Bees [Hymenoptera Allergenic Extract] Other (comments)    Ragweed Unknown (comments)    Risperidone Other (comments)       Family History   Family history unknown: Yes     Social History     Tobacco Use    Smoking status: Current Every Day Smoker     Packs/day: 0.25     Years: 30.00     Pack years: 7.50    Smokeless tobacco: Never Used   Substance Use Topics    Alcohol use:  No

## 2020-12-30 NOTE — PATIENT INSTRUCTIONS
Medicare Wellness Visit, Female The best way to live healthy is to have a lifestyle where you eat a well-balanced diet, exercise regularly, limit alcohol use, and quit all forms of tobacco/nicotine, if applicable. Regular preventive services are another way to keep healthy. Preventive services (vaccines, screening tests, monitoring & exams) can help personalize your care plan, which helps you manage your own care. Screening tests can find health problems at the earliest stages, when they are easiest to treat. [facility] follows the current, evidence-based guidelines published by the [facility] Braden ([facility]) when recommending preventive services for our patients. Because we follow these guidelines, sometimes recommendations change over time as research supports it. (For example, mammograms used to be recommended annually. Even though Medicare will still pay for an annual mammogram, the newer guidelines recommend a mammogram every two years for women of average risk). Of course, you and your doctor may decide to screen more often for some diseases, based on your risk and your co-morbidities (chronic disease you are already diagnosed with). Preventive services for you include: - Medicare offers their members a free annual wellness visit, which is time for you and your primary care provider to discuss and plan for your preventive service needs. Take advantage of this benefit every year! 
-All adults over the age of 72 should receive the recommended pneumonia vaccines. Current USPSTF guidelines recommend a series of two vaccines for the best pneumonia protection.  
-All adults should have a flu vaccine yearly and a tetanus vaccine every 10 years.  
-All adults age 48 and older should receive the shingles vaccines (series of two vaccines). -All adults age 38-68 who are overweight should have a diabetes screening test once every three years. -All adults born between [birth year] and 1963 should be screened once for Hepatitis C. 
-Other screening tests and preventive services for persons with diabetes include: an eye exam to screen for diabetic retinopathy, a kidney function test, a foot exam, and stricter control over your cholesterol.  
-Cardiovascular screening for adults with routine risk involves an electrocardiogram (ECG) at intervals determined by your doctor.  
-Colorectal cancer screenings should be done for adults age 54-65 with no increased risk factors for colorectal cancer. There are a number of acceptable methods of screening for this type of cancer. Each test has its own benefits and drawbacks. Discuss with your doctor what is most appropriate for you during your annual wellness visit. The different tests include: colonoscopy (considered the best screening method), a fecal occult blood test, a fecal DNA test, and sigmoidoscopy. 
 
-A bone mass density test is recommended when a woman turns 65 to screen for osteoporosis. This test is only recommended one time, as a screening. Some providers will use this same test as a disease monitoring tool if you already have osteoporosis. -Breast cancer screenings are recommended every other year for women of normal risk, age 54-69. 
-Cervical cancer screenings for women over age 72 are only recommended with certain risk factors. Here is a list of your current Health Maintenance items (your personalized list of preventive services) with a due date: 
Health Maintenance Due Topic Date Due  Pneumococcal Vaccine (1 of 1 - PPSV23) 03[birth year]03/1968  Shingles Vaccine (1 of 2) 03/03/2012  Pap Test  04/28/2020  Yearly Flu Vaccine (1) 09/01/2020 Ask pharmacy about shingles vaccines.

## 2020-12-30 NOTE — PROGRESS NOTES
Eleonora Rush is a 64 y.o. female  Chief Complaint   Patient presents with    Complete Physical     Health Maintenance Due   Topic Date Due    Pneumococcal 0-64 years (1 of 1 - PPSV23) 03[birth year]03/1968    Shingrix Vaccine Age 50> (1 of 2) 03/03/2012    PAP AKA CERVICAL CYTOLOGY  04/28/2020    Medicare Yearly Exam  07/30/2020    Flu Vaccine (1) 09/01/2020     Visit Vitals  /78 (BP 1 Location: Right arm, BP Patient Position: Sitting)   Pulse 97   Temp 97.3 °F (36.3 °C) (Temporal)   Resp 16   Ht 5' 1\" (1.549 m)   Wt 159 lb (72.1 kg)   SpO2 96%   BMI 30.04 kg/m²     1. Have you been to the ER, urgent care clinic since your last visit? Hospitalized since your last visit? No    2. Have you seen or consulted any other health care providers outside of the [address] [place] [address] since your last visit? Include any pap smears or colon screening.  No

## 2020-12-30 NOTE — PROGRESS NOTES
HPI:  Presents for f/u thyroid, lipids, etc    Accompanied by     Clinically euthyroid     Pt scratching and creating escar. Taking and tolerating meds. Past medical, Social, and Family history reviewed    Prior to Admission medications    Medication Sig Start Date End Date Taking? Authorizing Provider   pantoprazole (PROTONIX) 40 mg tablet TAKE 1 TABLET BY MOUTH EVERY DAY 10/28/20  Yes Deloris Munoz MD   levothyroxine (SYNTHROID) 50 mcg tablet TAKE 1 TABLET BY MOUTH EVERY MORNING BEFORE breakfast 10/28/20  Yes Deloris Munoz MD   fenofibrate (LOFIBRA) 160 mg tablet Take 1 Tab by mouth daily. 8/21/20  Yes Deloris Munoz MD   pravastatin (PRAVACHOL) 80 mg tablet Take 1 Tab by mouth nightly. 8/21/20  Yes Deloris Munoz MD   cholecalciferol (VITAMIN D3) (1000 Units /25 mcg) tablet Take 2 Tabs by mouth daily. 8/21/20  Yes Deloris Munoz MD   MYRBETRIQ 50 mg ER tablet TAKE 1 TABLET BY MOUTH EVERY DAY 2/19/20  Yes Provider, Historical   cyclobenzaprine (FLEXERIL) 10 mg tablet TAKE 1/2 TABLET BY MOUTH THREE TIMES DAILY AS NEEDED MUSCLE SPASMS 1/17/20  Yes Deloris Munoz MD   memantine [facility]) 10 mg tablet Take 1 Tab by mouth daily. 1/3/20  Yes Sundeep Devine DO   zinc sulfate (ZINCATE) 220 (50) mg capsule  7/1/19  Yes Provider, Historical   mirabegron ER (MYRBETRIQ) 25 mg ER tablet Take 50 mg by mouth daily. Yes Provider, Historical   QUEtiapine SR (SEROQUEL XR) 300 mg sr tablet Take 300 mg by mouth nightly. Yes Provider, Historical   haloperidol (HALDOL) 5 mg tablet Take 5 mg by mouth every morning. 5/1/18  Yes Provider, Historical   OLANZapine (ZYPREXA) 5 mg tablet Take  by mouth nightly. Yes Provider, Historical   LORazepam (ATIVAN) 1 mg tablet Take 1 Tab by mouth daily as needed (procedural anxiolysis). 2/16/18  Yes Deloris Munoz MD   haloperidol (HALDOL) 10 mg tablet Take 10 mg by mouth nightly.  12/1/17  Yes Provider, Historical   naproxen sodium (NAPROSYN) 220 mg tablet Take 220 mg by mouth two (2) times daily as needed. Yes Provider, Historical   PARoxetine (PAXIL) 20 mg tablet Take 10 mg by mouth daily. Indications: should be 10 mg   Yes Provider, Historical   divalproex ER (DEPAKOTE ER) 500 mg ER tablet Take 500 mg by mouth two (2) times a day. Yes Provider, Historical   melatonin 3 mg tablet Take  by mouth. Provider, Historical   triamcinolone acetonide (KENALOG) 0.1 % topical cream Apply  to affected area two (2) times a day. use thin layer as directed x 3-5 days prn 3/17/17   Conner Salazar MD   clotrimazole (LOTRIMIN) 1 % topical cream Apply  to affected area two (2) times a day. X 7-10 days 3/17/17   Conner Salazar MD   ibuprofen (MOTRIN) 400 mg tablet Take 1 Tab by mouth every eight (8) hours as needed for Pain. 1/29/16   Conner Salazar MD   permethrin (ACTICIN) 5 % topical cream Apply  to affected area now. apply sparingly as directed    Provider, Historical   sennosides (PERDIEM OVERNIGHT RELIEF) 15 mg Tab Take  by mouth. Provider, Historical          ROS  Complete ROS reviewed and negative or stable except as noted in HPI. Physical Exam  Vitals signs and nursing note reviewed. Constitutional:       General: She is not in acute distress. HENT:      Head: Normocephalic and atraumatic. Eyes:      General: No scleral icterus. Pupils: Pupils are equal, round, and reactive to light. Neck:      Musculoskeletal: Normal range of motion and neck supple. Vascular: No JVD. Cardiovascular:      Rate and Rhythm: Normal rate and regular rhythm. Heart sounds: Normal heart sounds. No murmur. No friction rub. No gallop. Pulmonary:      Effort: Pulmonary effort is normal. No respiratory distress. Breath sounds: Normal breath sounds. No wheezing or rales. Abdominal:      General: There is no distension. Palpations: Abdomen is soft. Tenderness: There is no abdominal tenderness.    Musculoskeletal: Normal range of motion. Lymphadenopathy:      Cervical: No cervical adenopathy. Skin:     General: Skin is warm. Findings: No rash. Neurological:      Mental Status: She is alert and oriented to person, place, and time. Motor: No abnormal muscle tone. Psychiatric:      Comments: At baseline           Prior labs reviewed. Assessment/Plan:    ICD-10-CM ICD-9-CM    1. Hypercholesterolemia  E78.00 272.0    2. Needs flu shot  Z23 V04.81 INFLUENZA VIRUS VAC QUAD,SPLIT,PRESV FREE SYRINGE IM   3. Medicare annual wellness visit, subsequent  Z00.00 V70.0    4. Encounter for gynecological examination without abnormal finding  Z01.419 V72.31 REFERRAL TO OBSTETRICS AND GYNECOLOGY   5. Hyperkalemia  Z98.0 785.5 METABOLIC PANEL, BASIC      METABOLIC PANEL, BASIC   6. Schizophrenia, schizo-affective ([facility] [place] [facility])  F25.9 295.70    7. Encounter for screening mammogram for breast cancer  Z12.31 V76.12 [facility] MAMMO BI SCREENING INCL CAD   8. Hypothyroidism due to acquired atrophy of thyroid  E03.4 244.8      246.8    9. Gastroesophageal reflux disease without esophagitis  K21.9 530.81    10. IGT (impaired glucose tolerance)  R73.02 790.22    11. Vitamin D deficiency  E55.9 268.9    12. Allergic rhinitis, unspecified seasonality, unspecified trigger  J30.9 477.9    13. Compulsive scratching behavior  R46.89 V40.39      Follow-up and Dispositions    · Return in about 6 months (around 6/30/2021), or if symptoms worsen or fail to improve, for cholesterol, thyroid. results and schedule of future studies reviewed with patient  reviewed diet, exercise and weight  cardiovascular risk and specific lipid/LDL goals reviewed  reviewed medications and side effects in detail     Encouraged to review with psych re: behavioral and/or medication modification.   Recheck potassium  Ref to GYN  Mammogram  Continue current medications

## 2021-01-11 RX ORDER — MEMANTINE HYDROCHLORIDE 10 MG/1
10 TABLET ORAL DAILY
Qty: 90 TAB | Refills: 3 | Status: SHIPPED | OUTPATIENT
Start: 2021-01-11 | End: 2021-12-21

## 2021-01-11 NOTE — TELEPHONE ENCOUNTER
Last visit 12/30/2020 MD Bishnu Rhodes   Next appointment 6 months (06/2021)   Previous refill encounter(s)   01/03/2020 Namenda #[id number] with 3 refills. Requested Prescriptions     Pending Prescriptions Disp Refills    memantine (NAMENDA) 10 mg tablet 90 Tab 3     Sig: Take 1 Tab by mouth daily.

## 2021-01-25 PROBLEM — U07.1 COVID-19: Status: ACTIVE | Noted: 2021-01-21

## 2021-02-18 RX ORDER — MIRABEGRON 50 MG/1
50 TABLET, FILM COATED, EXTENDED RELEASE ORAL DAILY
Qty: 90 TAB | Refills: 1 | Status: SHIPPED | OUTPATIENT
Start: 2021-02-18 | End: 2021-07-23

## 2021-02-18 NOTE — TELEPHONE ENCOUNTER
Historical medication: Myrbetriq 50 mg ER     Last visit 12/30/2020 MD Doc Charles   Next appointment 6 months (06/2021)     Requested Prescriptions     Pending Prescriptions Disp Refills    Myrbetriq 50 mg ER tablet 90 Tab 1     Sig: Take 1 Tab by mouth daily.

## 2021-04-14 RX ORDER — CYCLOBENZAPRINE HCL 10 MG
TABLET ORAL
Qty: 20 TAB | Refills: 1 | Status: SHIPPED | OUTPATIENT
Start: 2021-04-14 | End: 2021-06-09

## 2021-04-24 DIAGNOSIS — E03.4 HYPOTHYROIDISM DUE TO ACQUIRED ATROPHY OF THYROID: ICD-10-CM

## 2021-04-24 DIAGNOSIS — K21.9 GASTROESOPHAGEAL REFLUX DISEASE WITHOUT ESOPHAGITIS: ICD-10-CM

## 2021-04-24 RX ORDER — LEVOTHYROXINE SODIUM 50 UG/1
TABLET ORAL
Qty: 90 TAB | Refills: 1 | Status: SHIPPED | OUTPATIENT
Start: 2021-04-24 | End: 2021-10-21

## 2021-04-24 RX ORDER — PANTOPRAZOLE SODIUM 40 MG/1
TABLET, DELAYED RELEASE ORAL
Qty: 90 TAB | Refills: 1 | Status: SHIPPED | OUTPATIENT
Start: 2021-04-24 | End: 2021-10-21

## 2021-06-09 RX ORDER — CYCLOBENZAPRINE HCL 10 MG
TABLET ORAL
Qty: 20 TABLET | Refills: 0 | Status: SHIPPED | OUTPATIENT
Start: 2021-06-09 | End: 2022-11-04

## 2021-06-14 ENCOUNTER — HOSPITAL ENCOUNTER (OUTPATIENT)
Dept: MAMMOGRAPHY | Age: 61
Discharge: HOME | End: 2021-06-14
Attending: INTERNAL MEDICINE
Payer: MEDICARE

## 2021-06-14 DIAGNOSIS — Z12.31 ENCOUNTER FOR SCREENING MAMMOGRAM FOR BREAST CANCER: ICD-10-CM

## 2021-06-14 PROCEDURE — 77067 SCR MAMMO BI INCL CAD: CPT

## 2021-06-16 ENCOUNTER — TELEPHONE (OUTPATIENT)
Dept: RHEUMATOLOGY | Age: 61
End: 2021-06-16

## 2021-06-16 NOTE — TELEPHONE ENCOUNTER
Called patient on 6/16/2021 to schedule appt with physician here in the office patient voice mail box is full could not leave message.  sdh

## 2021-07-16 ENCOUNTER — TELEPHONE (OUTPATIENT)
Dept: INTERNAL MEDICINE CLINIC | Age: 61
End: 2021-07-16

## 2021-07-16 DIAGNOSIS — R76.8 CYCLIC CITRULLINATED PEPTIDE (CCP) ANTIBODY POSITIVE: ICD-10-CM

## 2021-07-16 DIAGNOSIS — M25.512 ACUTE PAIN OF BOTH SHOULDERS: Primary | ICD-10-CM

## 2021-07-16 DIAGNOSIS — M25.511 ACUTE PAIN OF BOTH SHOULDERS: Primary | ICD-10-CM

## 2021-07-16 NOTE — TELEPHONE ENCOUNTER
Pt is calling because she needs a new referral to the rheumatologist because , she is unable to make an appointment they are not calling her back so pt is asking provider to please place a new referral  to a new rheumatologist.   Please give pt a call when refferal is written   Best contact [phone]

## 2021-07-20 NOTE — TELEPHONE ENCOUNTER
Writer notified patient of referral information. No further questions or concerns prior to ending call.

## 2021-07-23 DIAGNOSIS — E78.00 HYPERCHOLESTEROLEMIA: ICD-10-CM

## 2021-07-23 RX ORDER — FENOFIBRATE 160 MG/1
TABLET ORAL
Qty: 30 TABLET | Refills: 11 | Status: SHIPPED | OUTPATIENT
Start: 2021-07-23 | End: 2022-09-08

## 2021-07-23 RX ORDER — MIRABEGRON 50 MG/1
TABLET, FILM COATED, EXTENDED RELEASE ORAL
Qty: 90 TABLET | Refills: 1 | Status: SHIPPED | OUTPATIENT
Start: 2021-07-23 | End: 2022-01-20

## 2021-07-23 RX ORDER — PRAVASTATIN SODIUM 80 MG/1
TABLET ORAL
Qty: 30 TABLET | Refills: 11 | Status: SHIPPED | OUTPATIENT
Start: 2021-07-23 | End: 2022-09-08

## 2021-08-07 ENCOUNTER — DOCUMENTATION ONLY (OUTPATIENT)
Dept: INTERNAL MEDICINE CLINIC | Age: 61
End: 2021-08-07

## 2021-08-07 NOTE — PROGRESS NOTES
Home Care Delivered order # [id number] signed 7/28/21 by provider, faxed 7/29/21 to [phone]; order and confirmation scanned into cc

## 2021-08-09 ENCOUNTER — TELEPHONE (OUTPATIENT)
Dept: INTERNAL MEDICINE CLINIC | Age: 61
End: 2021-08-09

## 2021-08-09 ENCOUNTER — OFFICE VISIT (OUTPATIENT)
Dept: INTERNAL MEDICINE CLINIC | Age: 61
End: 2021-08-09
Payer: MEDICARE

## 2021-08-09 VITALS
OXYGEN SATURATION: 94 % | SYSTOLIC BLOOD PRESSURE: 122 MMHG | WEIGHT: 154.6 LBS | HEART RATE: 92 BPM | DIASTOLIC BLOOD PRESSURE: 84 MMHG | HEIGHT: 61 IN | BODY MASS INDEX: 29.19 KG/M2 | TEMPERATURE: 98.3 F

## 2021-08-09 DIAGNOSIS — K21.9 GASTROESOPHAGEAL REFLUX DISEASE WITHOUT ESOPHAGITIS: ICD-10-CM

## 2021-08-09 DIAGNOSIS — R41.89 COGNITIVE AND BEHAVIORAL CHANGES: ICD-10-CM

## 2021-08-09 DIAGNOSIS — G21.19 PARKINSONISM DUE TO DRUG (HCC): ICD-10-CM

## 2021-08-09 DIAGNOSIS — E55.9 VITAMIN D DEFICIENCY: ICD-10-CM

## 2021-08-09 DIAGNOSIS — E87.5 HYPERKALEMIA: ICD-10-CM

## 2021-08-09 DIAGNOSIS — E03.4 HYPOTHYROIDISM DUE TO ACQUIRED ATROPHY OF THYROID: ICD-10-CM

## 2021-08-09 DIAGNOSIS — E78.00 HYPERCHOLESTEROLEMIA: Primary | ICD-10-CM

## 2021-08-09 DIAGNOSIS — F25.9 SCHIZOPHRENIA, SCHIZO-AFFECTIVE (HCC): ICD-10-CM

## 2021-08-09 DIAGNOSIS — R73.02 IGT (IMPAIRED GLUCOSE TOLERANCE): ICD-10-CM

## 2021-08-09 DIAGNOSIS — R46.89 COGNITIVE AND BEHAVIORAL CHANGES: ICD-10-CM

## 2021-08-09 PROCEDURE — 3017F COLORECTAL CA SCREEN DOC REV: CPT | Performed by: INTERNAL MEDICINE

## 2021-08-09 PROCEDURE — G8417 CALC BMI ABV UP PARAM F/U: HCPCS | Performed by: INTERNAL MEDICINE

## 2021-08-09 PROCEDURE — G8427 DOCREV CUR MEDS BY ELIG CLIN: HCPCS | Performed by: INTERNAL MEDICINE

## 2021-08-09 PROCEDURE — G8432 DEP SCR NOT DOC, RNG: HCPCS | Performed by: INTERNAL MEDICINE

## 2021-08-09 PROCEDURE — G9899 SCRN MAM PERF RSLTS DOC: HCPCS | Performed by: INTERNAL MEDICINE

## 2021-08-09 PROCEDURE — 99215 OFFICE O/P EST HI 40 MIN: CPT | Performed by: INTERNAL MEDICINE

## 2021-08-09 NOTE — PROGRESS NOTES
RM 14  Pt is not fasting     Chief Complaint   Patient presents with    Cholesterol Problem       Visit Vitals  /84   Pulse 92   Temp 98.3 °F (36.8 °C)   Ht 5' 1\" (1.549 m)   Wt 154 lb 9.6 oz (70.1 kg)   SpO2 94%   BMI 29.21 kg/m²       3 most recent PHQ Screens 8/9/2021   PHQ Not Done Medical Reason (indicate in comments)   Little interest or pleasure in doing things -   Feeling down, depressed, irritable, or hopeless -   Total Score PHQ 2 -         1. Have you been to the ER, urgent care clinic since your last visit? Hospitalized since your last visit? No    2. Have you seen or consulted any other health care providers outside of the [address] [place] [address] since your last visit? Include any pap smears or colon screening. No    Health Maintenance Due   Topic Date Due    Pneumococcal 0-64 years (1 of 2 - PPSV23) Never done    COVID-19 Vaccine (1) Never done    Shingrix Vaccine Age 50> (1 of 2) Never done    PAP AKA CERVICAL CYTOLOGY  04/28/2020       Learning Assessment 1/3/2020   PRIMARY LEARNER Patient   HIGHEST LEVEL OF EDUCATION - PRIMARY LEARNER  -   BARRIERS PRIMARY LEARNER -   [address] CAREGIVER -   CO-LEARNER NAME -   CO-LEARNER HIGHEST LEVEL OF EDUCATION -   Samina Denise 10 -   PRIMARY LANGUAGE ENGLISH   PRIMARY LANGUAGE CO-LEARNER -    NEED -   LEARNER PREFERENCE PRIMARY DEMONSTRATION   LEARNER PREFERENCE CO-LEARNER -   LEARNING SPECIAL TOPICS -   ANSWERED BY patient   RELATIONSHIP SELF     AVS  education, follow up, and recommendations provided and addressed with patient.

## 2021-08-09 NOTE — TELEPHONE ENCOUNTER
Request for PAP smear results was faxed to [phone].  transmission log received and placed in scanning

## 2021-08-09 NOTE — PROGRESS NOTES
HPI:  established patient  Presents for f/u lipids, thyroid, cognitive function     concerned about cognitive function. Due to check labs    No med changes from psych per     Aside from cognitive concerns, pt o/w stable. Past medical, Social, and Family history reviewed    Prior to Admission medications    Medication Sig Start Date End Date Taking? Authorizing Provider   fenofibrate (LOFIBRA) 160 mg tablet TAKE 1 TABLET BY MOUTH EVERY DAY 7/23/21  Yes Firman Duverney, MD   pravastatin (PRAVACHOL) 80 mg tablet TAKE 1 TABLET BY MOUTH AT BEDTIME 7/23/21  Yes Firman Duverney, MD   Myrbetriq 50 mg ER tablet TAKE 1 TABLET BY MOUTH EVERY DAY 7/23/21  Yes Firman Duverney, MD   cyclobenzaprine (FLEXERIL) 10 mg tablet TAKE 1/2 TABLET BY MOUTH THREE TIMES DAILY AS NEEDED FOR MUSCLE SPASMS 6/9/21  Yes Firman Duverney, MD   levothyroxine (SYNTHROID) 50 mcg tablet TAKE 1 TABLET BY MOUTH EVERY MORNING BEFORE breakfast 4/24/21  Yes Firman Duverney, MD   pantoprazole (PROTONIX) 40 mg tablet TAKE 1 TABLET BY MOUTH EVERY DAY 4/24/21  Yes Firman Duverney, MD   memantine [facility]) 10 mg tablet Take 1 Tab by mouth daily. 1/11/21  Yes Firman Duverney, MD   ibuprofen (MOTRIN) 600 mg tablet Take 1 Tab by mouth every six (6) hours as needed for Pain. 10/11/20  Yes Christos Vargas MD   cholecalciferol (VITAMIN D3) (1000 Units /25 mcg) tablet Take 2 Tabs by mouth daily. 8/21/20  Yes Firman Duverney, MD   zinc sulfate (ZINCATE) 220 (50) mg capsule  7/1/19  Yes Provider, Historical   QUEtiapine SR (SEROQUEL XR) 300 mg sr tablet Take 300 mg by mouth nightly. Yes Provider, Historical   haloperidol (HALDOL) 5 mg tablet Take 5 mg by mouth every morning. 5/1/18  Yes Provider, Historical   melatonin 3 mg tablet Take  by mouth. Yes Provider, Historical   OLANZapine (ZYPREXA) 5 mg tablet Take  by mouth nightly.    Yes Provider, Historical   LORazepam (ATIVAN) 1 mg tablet Take 1 Tab by mouth daily as needed (procedural anxiolysis). 2/16/18  Yes Olinda Orona MD   haloperidol (HALDOL) 10 mg tablet Take 10 mg by mouth nightly. 12/1/17  Yes Provider, Historical   PARoxetine (PAXIL) 20 mg tablet Take 10 mg by mouth daily. Indications: should be 10 mg   Yes Provider, Historical   ibuprofen (MOTRIN) 400 mg tablet Take 1 Tab by mouth every eight (8) hours as needed for Pain. 1/29/16  Yes Olinda Orona MD   divalproex ER (DEPAKOTE ER) 500 mg ER tablet Take 500 mg by mouth two (2) times a day. Yes Provider, Historical   triamcinolone acetonide (KENALOG) 0.1 % topical cream Apply  to affected area two (2) times a day. use thin layer as directed x 3-5 days prn  Patient not taking: Reported on 8/9/2021 3/17/17   Olinda Orona MD   clotrimazole (LOTRIMIN) 1 % topical cream Apply  to affected area two (2) times a day. X 7-10 days  Patient not taking: Reported on 8/9/2021 3/17/17   Olinda Orona MD   naproxen sodium (NAPROSYN) 220 mg tablet Take 220 mg by mouth two (2) times daily as needed. Patient not taking: Reported on 8/9/2021    Provider, Historical   permethrin (ACTICIN) 5 % topical cream Apply  to affected area now. apply sparingly as directed  Patient not taking: Reported on 8/9/2021    Provider, Historical   sennosides (PERDIEM OVERNIGHT RELIEF) 15 mg Tab Take  by mouth. Patient not taking: Reported on 8/9/2021    Provider, Historical          ROS  Complete ROS reviewed and negative or stable except as noted in HPI. Physical Exam  Vitals and nursing note reviewed. Constitutional:       General: She is not in acute distress. HENT:      Head: Normocephalic and atraumatic. Eyes:      General: No scleral icterus. Pupils: Pupils are equal, round, and reactive to light. Neck:      Vascular: No JVD. Cardiovascular:      Rate and Rhythm: Normal rate and regular rhythm. Heart sounds: Normal heart sounds. No murmur heard. No friction rub. No gallop.     Pulmonary:      Effort: Pulmonary effort is normal. No respiratory distress. Breath sounds: Normal breath sounds. No wheezing or rales. Abdominal:      General: There is no distension. Palpations: Abdomen is soft. Tenderness: There is no abdominal tenderness. Musculoskeletal:         General: Normal range of motion. Cervical back: Normal range of motion and neck supple. Lymphadenopathy:      Cervical: No cervical adenopathy. Skin:     General: Skin is warm. Findings: No rash. Neurological:      Mental Status: She is alert and oriented to person, place, and time. Motor: No abnormal muscle tone. Psychiatric:      Comments: At baseline           Prior labs reviewed. Reviewed prior imaging reports with   Reviewed prior neuro notes with     Assessment/Plan:    ICD-10-CM ICD-9-CM    1. Hypercholesterolemia  E78.00 034.4 CK      METABOLIC PANEL, COMPREHENSIVE      LIPID PANEL      LIPID PANEL      METABOLIC PANEL, COMPREHENSIVE      CK   2. Hypothyroidism due to acquired atrophy of thyroid  E03.4 244.8 TSH 3RD GENERATION     246.8 T4, FREE      T4, FREE      TSH 3RD GENERATION   3. IGT (impaired glucose tolerance)  R73.02 790.22 HEMOGLOBIN A1C WITH EAG      HEMOGLOBIN A1C WITH EAG   4. Vitamin D deficiency  E55.9 268.9 VITAMIN D, 25 HYDROXY      VITAMIN D, 25 HYDROXY   5. Schizophrenia, schizo-affective (HCC)  F25.9 295.70    6. Hyperkalemia  E87.5 276.7    7. Gastroesophageal reflux disease without esophagitis  K21.9 530.81 CBC WITH AUTOMATED DIFF      CBC WITH AUTOMATED DIFF   8. [facility]  G21.19 332.1 REFERRAL TO NEUROLOGY     E980.5    9. Cognitive and behavioral changes  R41.89 799.59 REFERRAL TO NEUROLOGY    R46.89 312.9      Follow-up and Dispositions    · Return in about 6 months (around 2/9/2022), or if symptoms worsen or fail to improve, for cholesterol, thyroid.        results and schedule of future studies reviewed with patient  reviewed diet, exercise and weight    cardiovascular risk and specific lipid/LDL goals reviewed  reviewed medications and side effects in detail    See Neuro  ?if pt is a candidate for neuropsych testing   Continue current medications   Labs at lab  Consider psych med dosing adjustment - likely source of much of her cognitive issues. Send lab letter      On this date 08/09/2021 I have spent 40 minutes reviewing previous notes, test results and face to face with the patient discussing the diagnosis and importance of compliance with the treatment plan as well as documenting on the day of the visit. An electronic signature was used to authenticate this note.   -- Gabbie Young MD

## 2021-08-24 DIAGNOSIS — E55.9 VITAMIN D DEFICIENCY: ICD-10-CM

## 2021-08-24 RX ORDER — MELATONIN
Qty: 60 TABLET | Status: SHIPPED | OUTPATIENT
Start: 2021-08-24 | End: 2022-09-08

## 2021-09-11 LAB
25(OH)D3+25(OH)D2 SERPL-MCNC: 37.5 NG/ML (ref 30–100)
ALBUMIN SERPL-MCNC: 3.8 G/DL (ref 3.8–4.9)
ALBUMIN/GLOB SERPL: 1.4 {RATIO} (ref 1.2–2.2)
ALP SERPL-CCNC: 90 IU/L (ref 48–121)
ALT SERPL-CCNC: 17 IU/L (ref 0–32)
AST SERPL-CCNC: 37 IU/L (ref 0–40)
BASOPHILS # BLD AUTO: 0 X10E3/UL (ref 0–0.2)
BASOPHILS NFR BLD AUTO: 0 %
BILIRUB SERPL-MCNC: 0.4 MG/DL (ref 0–1.2)
BUN SERPL-MCNC: 14 MG/DL (ref 6–24)
BUN/CREAT SERPL: 18 (ref 9–23)
CALCIUM SERPL-MCNC: 9.5 MG/DL (ref 8.7–10.2)
CHLORIDE SERPL-SCNC: 96 MMOL/L (ref 96–106)
CHOLEST SERPL-MCNC: 174 MG/DL (ref 100–199)
CK SERPL-CCNC: 122 U/L (ref 32–182)
CO2 SERPL-SCNC: 23 MMOL/L (ref 20–29)
CREAT SERPL-MCNC: 0.76 MG/DL (ref 0.57–1)
EOSINOPHIL # BLD AUTO: 0 X10E3/UL (ref 0–0.4)
EOSINOPHIL NFR BLD AUTO: 0 %
ERYTHROCYTE [DISTWIDTH] IN BLOOD BY AUTOMATED COUNT: 13 % (ref 11.7–15.4)
EST. AVERAGE GLUCOSE BLD GHB EST-MCNC: 117 MG/DL
GLOBULIN SER CALC-MCNC: 2.7 G/DL (ref 1.5–4.5)
GLUCOSE SERPL-MCNC: 85 MG/DL (ref 65–99)
HBA1C MFR BLD: 5.7 % (ref 4.8–5.6)
HCT VFR BLD AUTO: 38.2 % (ref 34–46.6)
HDLC SERPL-MCNC: 48 MG/DL
HGB BLD-MCNC: 12.5 G/DL (ref 11.1–15.9)
IMM GRANULOCYTES # BLD AUTO: 0.1 X10E3/UL (ref 0–0.1)
IMM GRANULOCYTES NFR BLD AUTO: 1 %
LDLC SERPL CALC-MCNC: 95 MG/DL (ref 0–99)
LYMPHOCYTES # BLD AUTO: 2.5 X10E3/UL (ref 0.7–3.1)
LYMPHOCYTES NFR BLD AUTO: 28 %
MCH RBC QN AUTO: 32.1 PG (ref 26.6–33)
MCHC RBC AUTO-ENTMCNC: 32.7 G/DL (ref 31.5–35.7)
MCV RBC AUTO: 98 FL (ref 79–97)
MONOCYTES # BLD AUTO: 0.9 X10E3/UL (ref 0.1–0.9)
MONOCYTES NFR BLD AUTO: 10 %
NEUTROPHILS # BLD AUTO: 5.4 X10E3/UL (ref 1.4–7)
NEUTROPHILS NFR BLD AUTO: 61 %
PLATELET # BLD AUTO: 268 X10E3/UL (ref 150–450)
POTASSIUM SERPL-SCNC: 4.9 MMOL/L (ref 3.5–5.2)
PROT SERPL-MCNC: 6.5 G/DL (ref 6–8.5)
RBC # BLD AUTO: 3.9 X10E6/UL (ref 3.77–5.28)
SODIUM SERPL-SCNC: 132 MMOL/L (ref 134–144)
T4 FREE SERPL-MCNC: 1.08 NG/DL (ref 0.82–1.77)
TRIGL SERPL-MCNC: 182 MG/DL (ref 0–149)
TSH SERPL DL<=0.005 MIU/L-ACNC: 3.08 UIU/ML (ref 0.45–4.5)
VLDLC SERPL CALC-MCNC: 31 MG/DL (ref 5–40)
WBC # BLD AUTO: 9 X10E3/UL (ref 3.4–10.8)

## 2021-09-13 NOTE — PROGRESS NOTES
Sodium is slightly low. This could be from too much plain water. Encourage hydration with electrolyte containing fluids, like sports drinks or juice.   Other labs are either normal or stable and at goal.  Continue your current medications

## 2021-10-18 NOTE — TELEPHONE ENCOUNTER
Pt has appt 10/21/21 w/ rheumatology / Dr Douglas Lyle; referral processed w/ [facility], scanned into cc

## 2021-10-21 ENCOUNTER — OFFICE VISIT (OUTPATIENT)
Dept: RHEUMATOLOGY | Age: 61
End: 2021-10-21
Payer: MEDICARE

## 2021-10-21 VITALS
HEIGHT: 61 IN | WEIGHT: 148.8 LBS | TEMPERATURE: 97.8 F | HEART RATE: 107 BPM | RESPIRATION RATE: 18 BRPM | BODY MASS INDEX: 28.09 KG/M2 | DIASTOLIC BLOOD PRESSURE: 86 MMHG | SYSTOLIC BLOOD PRESSURE: 124 MMHG | OXYGEN SATURATION: 98 %

## 2021-10-21 DIAGNOSIS — M75.82 TENDONITIS OF BOTH ROTATOR CUFFS: Primary | ICD-10-CM

## 2021-10-21 DIAGNOSIS — E03.4 HYPOTHYROIDISM DUE TO ACQUIRED ATROPHY OF THYROID: ICD-10-CM

## 2021-10-21 DIAGNOSIS — M15.9 PRIMARY OSTEOARTHRITIS INVOLVING MULTIPLE JOINTS: ICD-10-CM

## 2021-10-21 DIAGNOSIS — M75.81 TENDONITIS OF BOTH ROTATOR CUFFS: Primary | ICD-10-CM

## 2021-10-21 DIAGNOSIS — R76.8 CYCLIC CITRULLINATED PEPTIDE (CCP) ANTIBODY POSITIVE: ICD-10-CM

## 2021-10-21 DIAGNOSIS — K21.9 GASTROESOPHAGEAL REFLUX DISEASE WITHOUT ESOPHAGITIS: ICD-10-CM

## 2021-10-21 PROCEDURE — G8417 CALC BMI ABV UP PARAM F/U: HCPCS | Performed by: INTERNAL MEDICINE

## 2021-10-21 PROCEDURE — 99215 OFFICE O/P EST HI 40 MIN: CPT | Performed by: INTERNAL MEDICINE

## 2021-10-21 PROCEDURE — G8427 DOCREV CUR MEDS BY ELIG CLIN: HCPCS | Performed by: INTERNAL MEDICINE

## 2021-10-21 PROCEDURE — G9899 SCRN MAM PERF RSLTS DOC: HCPCS | Performed by: INTERNAL MEDICINE

## 2021-10-21 PROCEDURE — 3017F COLORECTAL CA SCREEN DOC REV: CPT | Performed by: INTERNAL MEDICINE

## 2021-10-21 PROCEDURE — G8432 DEP SCR NOT DOC, RNG: HCPCS | Performed by: INTERNAL MEDICINE

## 2021-10-21 RX ORDER — PANTOPRAZOLE SODIUM 40 MG/1
TABLET, DELAYED RELEASE ORAL
Qty: 90 TABLET | Refills: 1 | Status: SHIPPED | OUTPATIENT
Start: 2021-10-21 | End: 2022-04-21

## 2021-10-21 RX ORDER — LEVOTHYROXINE SODIUM 50 UG/1
TABLET ORAL
Qty: 90 TABLET | Refills: 1 | Status: SHIPPED | OUTPATIENT
Start: 2021-10-21 | End: 2022-04-21

## 2021-10-21 NOTE — PATIENT INSTRUCTIONS
1. I think your shoulder pain is due to rotator cuff tendinopathy and underlying osteoarthritis. I fortunately don't see any sign of autoimmune arthritis such as rheumatoid arthritis, even though you have the CCP antibody in the blood work that sometimes goes along with rheumatoid arthritis. 2. Physical therapy for your shoulders. 3. Talk to Dr. China Tran about trying an NSAID-type medication easier on the stomach, such as meloxicam once daily for pain. 4. Stop smoking, this increases your likelihood of developing autoimmune joint disease, and worsens pain. 5. Return if you develop new joint swelling or your PCP has concerns for new autoimmune disease. Keep working with your PCP on pain control, and orthopedics referral if the shoulder worsens.

## 2021-10-21 NOTE — PROGRESS NOTES
Patient states she has shoulder joint pain when lifting arms. Visit Vitals  /86 (BP 1 Location: Left arm, BP Patient Position: Sitting, BP Cuff Size: Small adult)   Pulse (!) 107   Temp 97.8 °F (36.6 °C) (Oral)   Resp 18   Ht 5' 1\" (1.549 m)   Wt 148 lb 12.8 oz (67.5 kg)   SpO2 98%   BMI 28.12 kg/m²     1. Have you been to the ER, urgent care clinic since your last visit? Hospitalized since your last visit?no    2. Have you seen or consulted any other health care providers outside of the [address] [place] [address] since your last visit? Include any pap smears or colon screening.  yes

## 2021-10-21 NOTE — PROGRESS NOTES
REASON FOR VISIT    This is the initial followup for Ms. Ramirez, a 63 y.o.  female for question of positive CCP. HISTORY OF PRESENT ILLNESS     Previous medical records reviewed and summarized: yes    About a year ago twisted awkwardly, hurt her left ankle and struck her right shoulder against a wall, since which time she has had increased pain in both shoulders and sometimes the ankle. Labs in 11/19 showed high-titer CCP+ with normal acute phase reactants. Initially saw Dr. Sybil Nicholson in this clinic in 2/20, when she was felt to mostly have osteoarthritic pain. Subacromial injection performed at that visit. Doesn't recall a major improvement after subacromial injection last visit. Pains still mostly in the shoulders. Pain is more noticeable late in the day. Takes an Advil when having more shoulder pain or a headache which is helpful. . Excedrin or naproxen she doesn't think are as helpful. Denies night sweats since menopause. Weight is down 10 pounds over the last year, she attributes to eating a healthier diet. No cough or dyspnea on exertion. Smokes between 1/4 and 1 PPD by description. REVIEW OF SYSTEMS    A 15 point review of systems was performed and summarized below. The questionnaire was reviewed with the patient and scanned into the patient's medical record.     General:denies recent weight gain, fatigue, weakness, fever, drenching night sweats  Musculoskeletal:  Denies joint swelling, morning stiffness, muscle pain  Ears: denies ringing in ears, hearing loss, deafness  Eyes: denies pain, light sensitive, redness, blindness, double vision, blurred vision, excess tearing, dryness, foreign body sensation  Mouth: denies sore tongue, oral ulcers, loss of taste, dryness, increased dental caries  Nose: denies nosebleeds, nasal ulcers  Throat: denies food stuck when swallowing, difficulty with swallowing, hoarseness, pain in jaw while chewing  Neck: denies swollen glands, tender glands  Cardiopulmonary: denies pain in chest with deep breaths, pain in chest when lying down, murmurs, sudden changes in heart beat, wheezing, dry cough, productive cough, shortness of breath at rest, shortness of breath on exertion, coughing of blood  Gastrointestinal: denies nausea, heartburn, stomach pain relieved by food, chronic constipation, chronic diarrhea, blood in stools, black stools  Genitourinary: denies vaginal dryness, pain or burning on urination, blood in urine, cloudy urine, vaginal ulcers, penile ulcers  Hematologic: denies anemia, bleeding tendency, blood clots, bleeding gums  Skin: denies easy bruising, hair loss, rash, rash worsened after sun exposure, hives/urticaria, skin thickening, skin tightness, nodules/bumps, color changes of hands or feet in the cold (Raynaud's)  Neurologic:  denies numbness or tingling in hands, numbness or tingling in feet, muscle weakness  Psychiatric: denies depression, excessive worries, PTSD, Bipolar  Sleep: denies poor sleep (6-8 hours), denies snoring, apnea, daytime somnolence, difficulty falling asleep, difficulty staying asleep     PAST MEDICAL HISTORY    Past Medical History:   Diagnosis Date    Allergic rhinitis     [facility] ([facility] 02/2018    Colon polyps     COVID-19 01/21/2021    GERD (gastroesophageal reflux disease)     H/O colonoscopy     History of Papanicolaou smear of cervix 2014    Hypercholesterolemia     hypercholesterolemia    Hypothyroidism     IGT (impaired glucose tolerance)     Kidney tumor (benign)     removed in childhood     Other ill-defined conditions(799.89)     right shoulder bursitis    Psychiatric disorder     schizophrenia    Schizophrenia, schizo-affective ([address]     Thyroid disease         Past Surgical History:   Procedure Laterality Date    COLONOSCOPY Left 10/1/2019    COLONOSCOPY performed by Vee Zaman MD at [facility]COPY    HX UROLOGICAL      tumor removal as child kidney       FAMILY HISTORY    Family History   Family history unknown: Yes       SOCIAL HISTORY    Social History     Tobacco Use    Smoking status: Current Every Day Smoker     Packs/day: 0.25     Years: 30.00     Pack years: 7.50    Smokeless tobacco: Never Used   Vaping Use    Vaping Use: Never used   Substance Use Topics    Alcohol use: No    Drug use: No       MEDICATIONS    Current Outpatient Medications   Medication Sig Dispense Refill    pantoprazole (PROTONIX) 40 mg tablet TAKE 1 TABLET BY MOUTH EVERY DAY 90 Tablet 1    levothyroxine (SYNTHROID) 50 mcg tablet TAKE 1 TABLET BY MOUTH EVERY MORNING BEFORE breakfast 90 Tablet 1    cholecalciferol (VITAMIN D3) (1000 Units /25 mcg) tablet TAKE 2 TABLETS BY MOUTH EVERY DAY 60 Tablet PRN    fenofibrate (LOFIBRA) 160 mg tablet TAKE 1 TABLET BY MOUTH EVERY DAY 30 Tablet 11    pravastatin (PRAVACHOL) 80 mg tablet TAKE 1 TABLET BY MOUTH AT BEDTIME 30 Tablet 11    Myrbetriq 50 mg ER tablet TAKE 1 TABLET BY MOUTH EVERY DAY 90 Tablet 1    cyclobenzaprine (FLEXERIL) 10 mg tablet TAKE 1/2 TABLET BY MOUTH THREE TIMES DAILY AS NEEDED FOR MUSCLE SPASMS 20 Tablet 0    memantine (NAMENDA) 10 mg tablet Take 1 Tab by mouth daily. 90 Tab 3    ibuprofen (MOTRIN) 600 mg tablet Take 1 Tab by mouth every six (6) hours as needed for Pain. 20 Tab 0    zinc sulfate (ZINCATE) 220 (50) mg capsule   99    QUEtiapine SR (SEROQUEL XR) 300 mg sr tablet Take 300 mg by mouth nightly.  haloperidol (HALDOL) 5 mg tablet Take 5 mg by mouth every morning.  melatonin 3 mg tablet Take  by mouth.  OLANZapine (ZYPREXA) 5 mg tablet Take  by mouth nightly.  LORazepam (ATIVAN) 1 mg tablet Take 1 Tab by mouth daily as needed (procedural anxiolysis). 3 Tab 0    haloperidol (HALDOL) 10 mg tablet Take 10 mg by mouth nightly.  triamcinolone acetonide (KENALOG) 0.1 % topical cream Apply  to affected area two (2) times a day.  use thin layer as directed x 3-5 days prn (Patient not taking: Reported on 8/9/2021) 30 g 1    clotrimazole (LOTRIMIN) 1 % topical cream Apply  to affected area two (2) times a day. X 7-10 days (Patient not taking: Reported on 8/9/2021) 45 g 1    naproxen sodium (NAPROSYN) 220 mg tablet Take 220 mg by mouth two (2) times daily as needed. (Patient not taking: Reported on 8/9/2021)      PARoxetine (PAXIL) 20 mg tablet Take 10 mg by mouth daily. Indications: should be 10 mg      ibuprofen (MOTRIN) 400 mg tablet Take 1 Tab by mouth every eight (8) hours as needed for Pain. 30 Tab 1    permethrin (ACTICIN) 5 % topical cream Apply  to affected area now. apply sparingly as directed (Patient not taking: Reported on 8/9/2021)      sennosides (PERDIEM OVERNIGHT RELIEF) 15 mg Tab Take  by mouth. (Patient not taking: Reported on 8/9/2021)      divalproex ER (DEPAKOTE ER) 500 mg ER tablet Take 500 mg by mouth two (2) times a day. ALLERGIES    Allergies   Allergen Reactions    Bee Sting [Sting, Bee] Unknown (comments)     Patient states this is not an allergy      Bees [Hymenoptera Allergenic Extract] Other (comments)    Ragweed Unknown (comments)    Risperidone Other (comments)       PHYSICAL EXAMINATION    Visit Vitals  /86 (BP 1 Location: Left arm, BP Patient Position: Sitting, BP Cuff Size: Small adult)   Pulse (!) 107   Temp 97.8 °F (36.6 °C) (Oral)   Resp 18   Ht 5' 1\" (1.549 m)   Wt 148 lb 12.8 oz (67.5 kg)   SpO2 98%   BMI 28.12 kg/m²     Body mass index is 28.12 kg/m². General: NAD  HEENT: PERRL, anicteric, non-injected sclerae; oropharynx without ulcers, erythema, or exudate. Moist mucous membranes. Lymphatic: No cervical or axillary lymphadenopathy. Cardiovascular: S1, S2,no R/M/G  Pulmonary: CTA b/l. No wheezes/rales/rhonchi. Abdominal: Soft,NTND, + BS. Skin: No rash, nodules, or periungual changes. No psoriaform rashes.   Neuro: Dysarthric, alert; able to carry normal conversation  Psych: Somewhat paranoid (dogmatic re: prior injuries), talks over , perseverant (weight watchers books, unhelpful prior therapies)    Musculoskeletal:   No swollen joints  +bilateral AC tenderness and tenderness with bilateral shoulder extension and abduction. Full active ROM. DATA REVIEW    Prior medical records were reviewed and if applicable are summarized as below:    Labs:   9/10/21: WBC 9.0, Hgb 12.5, Plt 268, Cr 0.76, LFTs WNL, A1c 5.7, TSH WNL  1/21/21: SARS-CoV2+  11/2019:CCP positive, RF negative, Cbc, cmp unremarkable    Imaging:   10/11/20 XR L knee:No acute abnormality. Mild osteoarthritis, no chondrocalcinosis  10/10/20 XR R shoulder: FINDINGS: Three views of the right shoulder demonstrate no fracture, dislocation or other acute abnormality. Glenohumeral and acromioclavicular osteoarthritic changes are noted. Bones are diffusely osteopenic. ASSESSMENT AND PLAN    A 63 y.o. female with hx of schizoaffective disorder presents for evaluation of positive CCP antibody on a background of smoking, with historically normal acute phase reactants, no peripheral synovitis, and degenerative-character pain. She does have mild bilateral shoulder osteoarthritis radiographically and rotator cuff tendinopathies. Recommend physical therapy for functional maintenance, and a trial of meloxicam for her h/o gastritis with good responses to ibuprofen. Provided a referral to PT and encouraged her to follow back up with her PCP to discuss further treatments for osteoarthritis. Counseled her that smoking likely increases the risk of developing clinical rheumatoid arthritis and tends to worsen chronic pain, but she is not motivated to stop. If she develops new joint swelling, rashes, interstitial lung disease, or other concerns for autoimmunity we will be happy to reevaluate her. 1. Tendonitis of both rotator cuffs  - REFERRAL TO PHYSICAL THERAPY  - Pt to discuss NSAID trials such as meloxicam with PCP    2.  Primary osteoarthritis involving multiple joints  - REFERRAL TO PHYSICAL THERAPY    3. Cyclic citrullinated peptide (CCP) antibody positive  - Reinforced smoking cessation  - Provided reassurance, though she may be at increased risk for development of RA in the future. Patient Instructions   1. I think your shoulder pain is due to rotator cuff tendinopathy and underlying osteoarthritis. I fortunately don't see any sign of autoimmune arthritis such as rheumatoid arthritis, even though you have the CCP antibody in the blood work that sometimes goes along with rheumatoid arthritis. 2. Physical therapy for your shoulders. 3. Talk to Dr. Luis Martinez about trying an NSAID-type medication easier on the stomach, such as meloxicam once daily for pain. 4. Stop smoking, this increases your likelihood of developing autoimmune joint disease, and worsens pain. 5. Return if you develop new joint swelling or your PCP has concerns for new autoimmune disease. Keep working with your PCP on pain control, and orthopedics referral if the shoulder worsens. RTC as needed     Face to face time: 30 minutes  Note preparation and records review day of service: 20 minutes  Total provider time day of service:50  minutes    TODAY'S ORDERS    Orders Placed This Encounter    REFERRAL TO PHYSICAL THERAPY        No future appointments.     Aiden Costello MD    Adult Rheumatology   [facility]  A [facility], [address]   Phone [phone]  Fax [phone]

## 2021-10-21 NOTE — LETTER
10/21/2021    Patient: Leigh Fermin   YOB: 1960   Date of Visit: 10/21/2021     Julia Mercedes MD  [facility] [address] [id number]  [place] [place]    Dear Julia Mercedes MD,    We recently saw Ms. Batsheva Ramirez in the [facility] for evaluation. My notes for this consultation are attached. If you have questions, please do not hesitate to call me.       Sincerely,  Sheryl Fisher MD [facility]  Cell: [phone]

## 2021-10-27 ENCOUNTER — TELEPHONE (OUTPATIENT)
Dept: INTERNAL MEDICINE CLINIC | Age: 61
End: 2021-10-27

## 2021-10-27 NOTE — TELEPHONE ENCOUNTER
Patient scheduled for appointment for routine check and needs chest x ray before respite care intake on 11/22/21.

## 2021-10-27 NOTE — TELEPHONE ENCOUNTER
----- Message from Jason Norman sent at 10/26/2021  8:48 AM EDT -----  Subject: Message to Provider    QUESTIONS  Information for Provider? Patient is calling to request orders for a chest   Xray so that she can schedule an appt.  ---------------------------------------------------------------------------  --------------  CALL BACK INFO  What is the best way for the office to contact you? OK to leave message on   voicemail  Preferred Call Back Phone Number? [phone]  ---------------------------------------------------------------------------  --------------  SCRIPT ANSWERS  Relationship to Patient?  Self

## 2021-11-02 ENCOUNTER — HOSPITAL ENCOUNTER (OUTPATIENT)
Dept: GENERAL RADIOLOGY | Age: 61
Discharge: HOME | End: 2021-11-02
Attending: INTERNAL MEDICINE
Payer: MEDICARE

## 2021-11-02 ENCOUNTER — OFFICE VISIT (OUTPATIENT)
Dept: INTERNAL MEDICINE CLINIC | Age: 61
End: 2021-11-02

## 2021-11-02 VITALS
TEMPERATURE: 98.2 F | WEIGHT: 150.4 LBS | SYSTOLIC BLOOD PRESSURE: 120 MMHG | DIASTOLIC BLOOD PRESSURE: 85 MMHG | OXYGEN SATURATION: 94 % | HEIGHT: 61 IN | BODY MASS INDEX: 28.4 KG/M2 | HEART RATE: 101 BPM

## 2021-11-02 DIAGNOSIS — Z23 NEEDS FLU SHOT: ICD-10-CM

## 2021-11-02 DIAGNOSIS — G47.9 SLEEP DISTURBANCE: Primary | ICD-10-CM

## 2021-11-02 DIAGNOSIS — R73.02 IGT (IMPAIRED GLUCOSE TOLERANCE): ICD-10-CM

## 2021-11-02 DIAGNOSIS — E03.4 HYPOTHYROIDISM DUE TO ACQUIRED ATROPHY OF THYROID: ICD-10-CM

## 2021-11-02 DIAGNOSIS — E87.1 HYPONATREMIA: ICD-10-CM

## 2021-11-02 DIAGNOSIS — F25.9 SCHIZOPHRENIA, SCHIZO-AFFECTIVE (HCC): ICD-10-CM

## 2021-11-02 DIAGNOSIS — E78.00 HYPERCHOLESTEROLEMIA: ICD-10-CM

## 2021-11-02 DIAGNOSIS — Z11.1 SCREENING-PULMONARY TB: ICD-10-CM

## 2021-11-02 PROCEDURE — 90686 IIV4 VACC NO PRSV 0.5 ML IM: CPT | Performed by: INTERNAL MEDICINE

## 2021-11-02 PROCEDURE — 71046 X-RAY EXAM CHEST 2 VIEWS: CPT

## 2021-11-02 PROCEDURE — 3017F COLORECTAL CA SCREEN DOC REV: CPT | Performed by: INTERNAL MEDICINE

## 2021-11-02 PROCEDURE — G8432 DEP SCR NOT DOC, RNG: HCPCS | Performed by: INTERNAL MEDICINE

## 2021-11-02 PROCEDURE — G8417 CALC BMI ABV UP PARAM F/U: HCPCS | Performed by: INTERNAL MEDICINE

## 2021-11-02 PROCEDURE — 99214 OFFICE O/P EST MOD 30 MIN: CPT | Performed by: INTERNAL MEDICINE

## 2021-11-02 PROCEDURE — G9899 SCRN MAM PERF RSLTS DOC: HCPCS | Performed by: INTERNAL MEDICINE

## 2021-11-02 PROCEDURE — G8427 DOCREV CUR MEDS BY ELIG CLIN: HCPCS | Performed by: INTERNAL MEDICINE

## 2021-11-02 PROCEDURE — G0008 ADMIN INFLUENZA VIRUS VAC: HCPCS | Performed by: INTERNAL MEDICINE

## 2021-11-02 RX ORDER — TRAZODONE HYDROCHLORIDE 50 MG/1
50 TABLET ORAL
Qty: 30 TABLET | Refills: 5 | Status: SHIPPED | OUTPATIENT
Start: 2021-11-02 | End: 2022-03-01

## 2021-11-02 NOTE — PROGRESS NOTES
RM 15    Chief Complaint   Patient presents with    Cholesterol Problem    Thyroid Problem    Form Completion     needs physical form completed and order for chest Xray        Visit Vitals  /85   Pulse (!) 101   Temp 98.2 °F (36.8 °C)   Ht 5' 1\" (1.549 m)   Wt 150 lb 6.4 oz (68.2 kg)   SpO2 94%   BMI 28.42 kg/m²       3 most recent PHQ Screens 11/2/2021   PHQ Not Done Functional capacity motivation limits accuracy   Little interest or pleasure in doing things -   Feeling down, depressed, irritable, or hopeless -   Total Score PHQ 2 -         1. Have you been to the ER, urgent care clinic since your last visit? Hospitalized since your last visit? No    2. Have you seen or consulted any other health care providers outside of the [address] [place] [address] since your last visit? Include any pap smears or colon screening. No    Health Maintenance Due   Topic Date Due    Pneumococcal 0-64 years (1 of 2 - PPSV23) Never done    Shingrix Vaccine Age 50> (1 of 2) Never done    Cervical cancer screen  04/28/2020    Flu Vaccine (1) 09/01/2021       Learning Assessment 1/3/2020   PRIMARY LEARNER Patient   HIGHEST LEVEL OF EDUCATION - PRIMARY LEARNER  -   BARRIERS PRIMARY LEARNER -   [address]  CAREGIVER -   CO-LEARNER NAME -   CO-LEARNER HIGHEST LEVEL OF EDUCATION -   Samina Denise 10 -   PRIMARY LANGUAGE ENGLISH   PRIMARY LANGUAGE CO-LEARNER -    NEED -   LEARNER PREFERENCE PRIMARY DEMONSTRATION   LEARNER PREFERENCE CO-LEARNER -   LEARNING SPECIAL TOPICS -   ANSWERED BY patient   RELATIONSHIP SELF   After obtaining consent, and per orders of Dr. Kamille Fitzgerald, injection of flu given by Arely Lee. Patient instructed to remain in clinic for 20 minutes afterwards, and to report any adverse reaction to me immediately. AVS  education, follow up, and recommendations provided and addressed with patient.   services used to advise patient -no

## 2021-11-02 NOTE — PROGRESS NOTES
HPI:  established patient  Presents for f/u day program paperwork, sleep, etc    Accompanied by     Difficulty sleeping despite current meds and melatonin    Pt needs TB screening and request was for CXR    Needs paperwork to attend adult  program.    O/w stable    Past medical, Social, and Family history reviewed    Prior to Admission medications    Medication Sig Start Date End Date Taking? Authorizing Provider   pantoprazole (PROTONIX) 40 mg tablet TAKE 1 TABLET BY MOUTH EVERY DAY 10/21/21  Yes Omari Cordero MD   levothyroxine (SYNTHROID) 50 mcg tablet TAKE 1 TABLET BY MOUTH EVERY MORNING BEFORE breakfast 10/21/21  Yes Omari Cordero MD   cholecalciferol (VITAMIN D3) (1000 Units /25 mcg) tablet TAKE 2 TABLETS BY MOUTH EVERY DAY 8/24/21  Yes Omari Cordero MD   fenofibrate (LOFIBRA) 160 mg tablet TAKE 1 TABLET BY MOUTH EVERY DAY 7/23/21  Yes Omari Cordero MD   pravastatin (PRAVACHOL) 80 mg tablet TAKE 1 TABLET BY MOUTH AT BEDTIME 7/23/21  Yes Omari Cordero MD   Myrbetriq 50 mg ER tablet TAKE 1 TABLET BY MOUTH EVERY DAY 7/23/21  Yes Omari Cordero MD   cyclobenzaprine (FLEXERIL) 10 mg tablet TAKE 1/2 TABLET BY MOUTH THREE TIMES DAILY AS NEEDED FOR MUSCLE SPASMS 6/9/21  Yes Omari Cordero MD   memantine [facility]) 10 mg tablet Take 1 Tab by mouth daily. 1/11/21  Yes Omari Cordero MD   ibuprofen (MOTRIN) 600 mg tablet Take 1 Tab by mouth every six (6) hours as needed for Pain. 10/11/20  Yes May Escalante MD   zinc sulfate (ZINCATE) 220 (50) mg capsule  7/1/19  Yes Provider, Historical   QUEtiapine SR (SEROQUEL XR) 300 mg sr tablet Take 300 mg by mouth nightly. Yes Provider, Historical   haloperidol (HALDOL) 5 mg tablet Take 5 mg by mouth every morning. 5/1/18  Yes Provider, Historical   melatonin 3 mg tablet Take  by mouth. Yes Provider, Historical   OLANZapine (ZYPREXA) 5 mg tablet Take  by mouth nightly.    Yes Provider, Historical   LORazepam (ATIVAN) 1 mg tablet Take 1 Tab by mouth daily as needed (procedural anxiolysis). 2/16/18  Yes Jossie Evans MD   haloperidol (HALDOL) 10 mg tablet Take 10 mg by mouth nightly. 12/1/17  Yes Provider, Historical   naproxen sodium (NAPROSYN) 220 mg tablet Take 220 mg by mouth two (2) times daily as needed. Yes Provider, Historical   PARoxetine (PAXIL) 20 mg tablet Take 10 mg by mouth daily. Indications: should be 10 mg   Yes Provider, Historical   ibuprofen (MOTRIN) 400 mg tablet Take 1 Tab by mouth every eight (8) hours as needed for Pain. 1/29/16  Yes Jossie Evans MD   divalproex ER (DEPAKOTE ER) 500 mg ER tablet Take 500 mg by mouth two (2) times a day. Yes Provider, Historical   triamcinolone acetonide (KENALOG) 0.1 % topical cream Apply  to affected area two (2) times a day. use thin layer as directed x 3-5 days prn  Patient not taking: Reported on 10/21/2021 3/17/17   Jossie Evans MD   clotrimazole (LOTRIMIN) 1 % topical cream Apply  to affected area two (2) times a day. X 7-10 days  Patient not taking: Reported on 11/2/2021 3/17/17   Jossie Evans MD   permethrin (ACTICIN) 5 % topical cream Apply  to affected area now. apply sparingly as directed  Patient not taking: Reported on 8/9/2021    Provider, Historical   sennosides (PERDIEM OVERNIGHT RELIEF) 15 mg Tab Take  by mouth. Patient not taking: Reported on 11/2/2021    Provider, Historical          ROS  Complete ROS reviewed and negative or stable except as noted in HPI. Physical Exam  Vitals and nursing note reviewed. Constitutional:       General: She is not in acute distress. HENT:      Head: Normocephalic and atraumatic. Eyes:      General: No scleral icterus. Pupils: Pupils are equal, round, and reactive to light. Neck:      Vascular: No JVD. Cardiovascular:      Rate and Rhythm: Normal rate and regular rhythm. Heart sounds: Normal heart sounds. No murmur heard. No friction rub. No gallop.     Pulmonary:      Effort: Pulmonary effort is normal. No respiratory distress. Breath sounds: Normal breath sounds. No wheezing or rales. Abdominal:      General: There is no distension. Palpations: Abdomen is soft. Tenderness: There is no abdominal tenderness. Musculoskeletal:         General: Normal range of motion. Cervical back: Normal range of motion and neck supple. Lymphadenopathy:      Cervical: No cervical adenopathy. Skin:     General: Skin is warm. Findings: No rash. Neurological:      Mental Status: She is alert and oriented to person, place, and time. Motor: No abnormal muscle tone. Psychiatric:      Comments: At baseline           Prior labs reviewed. Assessment/Plan:    ICD-10-CM ICD-9-CM    1. Sleep disturbance  G47.9 780.50 traZODone (DESYREL) 50 mg tablet   2. Screening-pulmonary TB  Z11.1 V74.1 XR CHEST PA LAT   3. Hyponatremia  P88.3 246.0 METABOLIC PANEL, BASIC      METABOLIC PANEL, BASIC   4. Hypothyroidism due to acquired atrophy of thyroid  E03.4 244.8      246.8    5. Hypercholesterolemia  E78.00 272.0    6. IGT (impaired glucose tolerance)  R73.02 790.22    7. Schizophrenia, schizo-affective ([facility] [address]  F25.9 295.70    8. Needs flu shot  Z23 V04.81 INFLUENZA VIRUS VAC QUAD,SPLIT,PRESV FREE SYRINGE IM     Follow-up and Dispositions    · Return in about 4 months (around 3/2/2022), or if symptoms worsen or fail to improve, for cholesterol, thyroid. results and schedule of future studies reviewed with patient  reviewed diet, exercise and weight   cardiovascular risk and specific lipid/LDL goals reviewed  reviewed medications and side effects in detail    Trazodone trial   CXR  Flu shot  Moderna booster at [facility] was used to authenticate this note.   -- Desiree Kurtz MD

## 2021-11-03 LAB
BUN SERPL-MCNC: 17 MG/DL (ref 6–24)
BUN/CREAT SERPL: 26 (ref 9–23)
CALCIUM SERPL-MCNC: 9.5 MG/DL (ref 8.7–10.2)
CHLORIDE SERPL-SCNC: 103 MMOL/L (ref 96–106)
CO2 SERPL-SCNC: 24 MMOL/L (ref 20–29)
CREAT SERPL-MCNC: 0.65 MG/DL (ref 0.57–1)
GLUCOSE SERPL-MCNC: 74 MG/DL (ref 65–99)
POTASSIUM SERPL-SCNC: 5.1 MMOL/L (ref 3.5–5.2)
SODIUM SERPL-SCNC: 140 MMOL/L (ref 134–144)

## 2021-11-10 ENCOUNTER — TELEPHONE (OUTPATIENT)
Dept: INTERNAL MEDICINE CLINIC | Age: 61
End: 2021-11-10

## 2021-11-10 NOTE — TELEPHONE ENCOUNTER
Spoke to leonel, needed a copy of xray results and last office visit note faxed to [facility] and [facility]. Faxed to both. Transmission log received and placed in scanning.

## 2021-11-10 NOTE — TELEPHONE ENCOUNTER
----- Message from Jessica Villafuerteon sent at 11/10/2021 12:43 PM EST -----  Subject: Message to Provider    QUESTIONS  Information for Provider? Shree Mcnair, a care coordinator, was looking   to get a copy of x-rays.   ---------------------------------------------------------------------------  --------------  CALL BACK INFO  What is the best way for the office to contact you? OK to respond with   electronic message via [employer] portal (only for patients who have   registered [employer] account)  Preferred Call Back Phone Number? [phone]  ---------------------------------------------------------------------------  --------------  SCRIPT ANSWERS  Relationship to Patient? Third Party  Representative Name?  Shree Mcnair

## 2021-12-21 RX ORDER — MEMANTINE HYDROCHLORIDE 10 MG/1
TABLET ORAL
Qty: 30 TABLET | Refills: 5 | Status: SHIPPED | OUTPATIENT
Start: 2021-12-21 | End: 2022-06-14 | Stop reason: SDUPTHER

## 2022-01-08 NOTE — PROGRESS NOTES
Patient to be transported by [address] at [address] to [facility], nurse report [phone] prior to transfer  RM#7  Chief Complaint   Patient presents with    Complete Physical     and TB test     Results for orders placed or performed in visit on 04/18/17   AMB POC URINALYSIS DIP STICK AUTO W/O MICRO   Result Value Ref Range    Color (UA POC) Yellow     Clarity (UA POC) Clear     Glucose (UA POC) Negative Negative    Bilirubin (UA POC) Negative Negative    Ketones (UA POC) Negative Negative    Specific gravity (UA POC) 1.015 1.001 - 1.035    Blood (UA POC) Trace Negative    pH (UA POC) 7.5 4.6 - 8.0    Protein (UA POC) Negative Negative mg/dL    Urobilinogen (UA POC) 2 mg/dL 0.2 - 1    Nitrites (UA POC) Negative Negative    Leukocyte esterase (UA POC) 2+ Negative       1. Have you been to the ER, urgent care clinic since your last visit? Hospitalized since your last visit? No    2. Have you seen or consulted any other health care providers outside of the [address] [place] [address] since your last visit? Include any pap smears or colon screening.  No

## 2022-01-20 RX ORDER — MIRABEGRON 50 MG/1
TABLET, FILM COATED, EXTENDED RELEASE ORAL
Qty: 30 TABLET | Refills: 5 | Status: SHIPPED | OUTPATIENT
Start: 2022-01-20 | End: 2022-09-08

## 2022-03-01 ENCOUNTER — TELEPHONE (OUTPATIENT)
Dept: INTERNAL MEDICINE CLINIC | Age: 62
End: 2022-03-01

## 2022-03-01 DIAGNOSIS — G47.9 SLEEP DISTURBANCE: ICD-10-CM

## 2022-03-01 RX ORDER — TRAZODONE HYDROCHLORIDE 50 MG/1
25 TABLET ORAL
Qty: 30 TABLET | Refills: 5 | Status: SHIPPED | OUTPATIENT
Start: 2022-03-01 | End: 2022-06-07 | Stop reason: SDUPTHER

## 2022-03-01 NOTE — TELEPHONE ENCOUNTER
Patient's caregiver (Mauro Shoemaker) would like to if she can cut the the patient's dose of trazodone in half. Patient seems to be a little disoriented in the mornings after taking a whole pill.

## 2022-03-02 NOTE — TELEPHONE ENCOUNTER
Called to advise caregiver of Dr. Nasrin Rios recommendations. VM is full and could not leave message. Will try again later.

## 2022-03-16 ENCOUNTER — OFFICE VISIT (OUTPATIENT)
Dept: INTERNAL MEDICINE CLINIC | Age: 62
End: 2022-03-16
Payer: MEDICARE

## 2022-03-16 VITALS
BODY MASS INDEX: 25.3 KG/M2 | DIASTOLIC BLOOD PRESSURE: 74 MMHG | SYSTOLIC BLOOD PRESSURE: 109 MMHG | RESPIRATION RATE: 18 BRPM | WEIGHT: 134 LBS | TEMPERATURE: 97.8 F | HEART RATE: 94 BPM | HEIGHT: 61 IN | OXYGEN SATURATION: 95 %

## 2022-03-16 DIAGNOSIS — Z01.00 EYE EXAM, ROUTINE: ICD-10-CM

## 2022-03-16 DIAGNOSIS — Z00.00 MEDICARE ANNUAL WELLNESS VISIT, SUBSEQUENT: Primary | ICD-10-CM

## 2022-03-16 DIAGNOSIS — E03.4 HYPOTHYROIDISM DUE TO ACQUIRED ATROPHY OF THYROID: ICD-10-CM

## 2022-03-16 DIAGNOSIS — Z23 ENCOUNTER FOR IMMUNIZATION: ICD-10-CM

## 2022-03-16 DIAGNOSIS — E78.00 HYPERCHOLESTEROLEMIA: ICD-10-CM

## 2022-03-16 DIAGNOSIS — Z12.31 SCREENING MAMMOGRAM FOR BREAST CANCER: ICD-10-CM

## 2022-03-16 DIAGNOSIS — E55.9 VITAMIN D DEFICIENCY: ICD-10-CM

## 2022-03-16 DIAGNOSIS — F25.9 SCHIZOPHRENIA, SCHIZO-AFFECTIVE (HCC): ICD-10-CM

## 2022-03-16 DIAGNOSIS — R73.02 IGT (IMPAIRED GLUCOSE TOLERANCE): ICD-10-CM

## 2022-03-16 DIAGNOSIS — E87.1 HYPONATREMIA: ICD-10-CM

## 2022-03-16 PROCEDURE — G8419 CALC BMI OUT NRM PARAM NOF/U: HCPCS | Performed by: INTERNAL MEDICINE

## 2022-03-16 PROCEDURE — G8432 DEP SCR NOT DOC, RNG: HCPCS | Performed by: INTERNAL MEDICINE

## 2022-03-16 PROCEDURE — G9899 SCRN MAM PERF RSLTS DOC: HCPCS | Performed by: INTERNAL MEDICINE

## 2022-03-16 PROCEDURE — G0009 ADMIN PNEUMOCOCCAL VACCINE: HCPCS | Performed by: INTERNAL MEDICINE

## 2022-03-16 PROCEDURE — 3017F COLORECTAL CA SCREEN DOC REV: CPT | Performed by: INTERNAL MEDICINE

## 2022-03-16 PROCEDURE — G8427 DOCREV CUR MEDS BY ELIG CLIN: HCPCS | Performed by: INTERNAL MEDICINE

## 2022-03-16 PROCEDURE — G0439 PPPS, SUBSEQ VISIT: HCPCS | Performed by: INTERNAL MEDICINE

## 2022-03-16 PROCEDURE — 90732 PPSV23 VACC 2 YRS+ SUBQ/IM: CPT | Performed by: INTERNAL MEDICINE

## 2022-03-16 PROCEDURE — 99214 OFFICE O/P EST MOD 30 MIN: CPT | Performed by: INTERNAL MEDICINE

## 2022-03-16 NOTE — PROGRESS NOTES
HPI:  established patient  Presents for f/u IGT, HTN, lipids, etc    Accompanied by     Taking and tolerating meds    No recent behavioral or psych med changes    Euthyroid. Still smoking. Past medical, Social, and Family history reviewed    Prior to Admission medications    Medication Sig Start Date End Date Taking? Authorizing Provider   traZODone (DESYREL) 50 mg tablet Take 0.5 Tablets by mouth nightly. 3/1/22  Yes Zechariah Cade MD   Myrbetriq 50 mg ER tablet TAKE 1 TABLET BY MOUTH EVERY DAY 1/20/22  Yes Zechariah Cade MD   memantine [facility]) 10 mg tablet TAKE 1 TABLET BY MOUTH EVERY DAY 12/21/21  Yes Zechariah Cade MD   pantoprazole (PROTONIX) 40 mg tablet TAKE 1 TABLET BY MOUTH EVERY DAY 10/21/21  Yes Zechariah Cade MD   levothyroxine (SYNTHROID) 50 mcg tablet TAKE 1 TABLET BY MOUTH EVERY MORNING BEFORE breakfast 10/21/21  Yes Zechariah Cade MD   cholecalciferol (VITAMIN D3) (1000 Units /25 mcg) tablet TAKE 2 TABLETS BY MOUTH EVERY DAY 8/24/21  Yes Zechariah Cade MD   fenofibrate (LOFIBRA) 160 mg tablet TAKE 1 TABLET BY MOUTH EVERY DAY 7/23/21  Yes Zechariah Cade MD   pravastatin (PRAVACHOL) 80 mg tablet TAKE 1 TABLET BY MOUTH AT BEDTIME 7/23/21  Yes Zechariah Cade MD   ibuprofen (MOTRIN) 600 mg tablet Take 1 Tab by mouth every six (6) hours as needed for Pain. 10/11/20  Yes Maisha Tucker MD   zinc sulfate (ZINCATE) 220 (50) mg capsule  7/1/19  Yes Provider, Historical   QUEtiapine SR (SEROQUEL XR) 300 mg sr tablet Take 300 mg by mouth nightly. Yes Provider, Historical   haloperidol (HALDOL) 5 mg tablet Take 5 mg by mouth every morning. 5/1/18  Yes Provider, Historical   melatonin 3 mg tablet Take  by mouth. Yes Provider, Historical   OLANZapine (ZYPREXA) 5 mg tablet Take  by mouth nightly. Yes Provider, Historical   LORazepam (ATIVAN) 1 mg tablet Take 1 Tab by mouth daily as needed (procedural anxiolysis).  2/16/18  Yes Zechariah Cade MD haloperidol (HALDOL) 10 mg tablet Take 10 mg by mouth nightly. 12/1/17  Yes Provider, Historical   clotrimazole (LOTRIMIN) 1 % topical cream Apply  to affected area two (2) times a day. X 7-10 days 3/17/17  Yes Sandra Lee MD   PARoxetine (PAXIL) 20 mg tablet Take 10 mg by mouth daily. Indications: should be 10 mg   Yes Provider, Historical   ibuprofen (MOTRIN) 400 mg tablet Take 1 Tab by mouth every eight (8) hours as needed for Pain. 1/29/16  Yes Sandra Lee MD   divalproex ER (DEPAKOTE ER) 500 mg ER tablet Take 500 mg by mouth two (2) times a day. Yes Provider, Historical   cyclobenzaprine (FLEXERIL) 10 mg tablet TAKE 1/2 TABLET BY MOUTH THREE TIMES DAILY AS NEEDED FOR MUSCLE SPASMS  Patient not taking: Reported on 3/16/2022 6/9/21   Sandra Lee MD   triamcinolone acetonide (KENALOG) 0.1 % topical cream Apply  to affected area two (2) times a day. use thin layer as directed x 3-5 days prn  Patient not taking: Reported on 10/21/2021 3/17/17   Sandra Lee MD   naproxen sodium (NAPROSYN) 220 mg tablet Take 220 mg by mouth two (2) times daily as needed. Patient not taking: Reported on 3/16/2022    Provider, Historical   permethrin (ACTICIN) 5 % topical cream Apply  to affected area now. apply sparingly as directed  Patient not taking: Reported on 8/9/2021    Provider, Historical   sennosides (PERDIEM OVERNIGHT RELIEF) 15 mg Tab Take  by mouth. Patient not taking: Reported on 11/2/2021    Provider, Historical          ROS  Complete ROS reviewed and negative or stable except as noted in HPI. Physical Exam  Vitals and nursing note reviewed. Constitutional:       General: She is not in acute distress. HENT:      Head: Normocephalic and atraumatic. Eyes:      General: No scleral icterus. Pupils: Pupils are equal, round, and reactive to light. Neck:      Vascular: No JVD. Cardiovascular:      Rate and Rhythm: Normal rate and regular rhythm.       Heart sounds: Normal heart sounds. No murmur heard. No friction rub. No gallop. Pulmonary:      Effort: Pulmonary effort is normal. No respiratory distress. Breath sounds: Normal breath sounds. No wheezing or rales. Abdominal:      General: There is no distension. Palpations: Abdomen is soft. Tenderness: There is no abdominal tenderness. Musculoskeletal:         General: Normal range of motion. Cervical back: Normal range of motion and neck supple. Lymphadenopathy:      Cervical: No cervical adenopathy. Skin:     General: Skin is warm. Findings: No rash. Neurological:      Mental Status: She is alert and oriented to person, place, and time. Motor: No abnormal muscle tone. Psychiatric:      Comments: At baseline           Prior labs reviewed. Assessment/Plan:    ICD-10-CM ICD-9-CM    1. Hypothyroidism due to acquired atrophy of thyroid  E03.4 244.8 CBC WITH AUTOMATED DIFF     246.8 TSH 3RD GENERATION      T4, FREE   2. Medicare annual wellness visit, subsequent  Z00.00 V70.0    3. Hyponatremia  E87.1 276.1    4. Hypercholesterolemia  E78.00 529.3 CK      METABOLIC PANEL, COMPREHENSIVE      LIPID PANEL   5. IGT (impaired glucose tolerance)  R73.02 790.22 HEMOGLOBIN A1C WITH EAG   6. Schizophrenia, schizo-affective ([facility] [address]  F25.9 295.70    7. Vitamin D deficiency  E55.9 268.9 VITAMIN D, 25 HYDROXY   8. Encounter for immunization  Z23 V03.89 PNEUMOCOCCAL POLYSACCHARIDE VACCINE, 23-VALENT, ADULT OR IMMUNOSUPPRESSED PT DOSE,   9. Screening mammogram for breast cancer  Z12.31 V76.12 ERIC 3D MAL W MAMMO BI SCREENING INCL CAD   8. Eye exam, routine  Z01.00 V72.0 REFERRAL TO OPHTHALMOLOGY     Follow-up and Dispositions    · Return in about 6 months (around 9/16/2022), or if symptoms worsen or fail to improve, for cholesterol, thyroid.         results and schedule of future studies reviewed with patient  reviewed diet, exercise and weight   cardiovascular risk and specific lipid/LDL goals reviewed  reviewed medications and side effects in detail    Send lab letter  Continue current medications   Labs   Pneumovax 23  Mammogram       An electronic signature was used to authenticate this note.   -- Gianfranco Chung MD

## 2022-03-16 NOTE — PROGRESS NOTES
This is the Subsequent Medicare Annual Wellness Exam, performed 12 months or more after the Initial AWV or the last Subsequent AWV    I have reviewed the patient's medical history in detail and updated the computerized patient record. Assessment/Plan   Education and counseling provided:  Are appropriate based on today's review and evaluation    ICD-10-CM ICD-9-CM    1. Medicare annual wellness visit, subsequent  Z00.00 V70.0    2. Hyponatremia  E87.1 276.1    3. Hypothyroidism due to acquired atrophy of thyroid  E03.4 244.8 CBC WITH AUTOMATED DIFF     246.8 TSH 3RD GENERATION      T4, FREE   4. Hypercholesterolemia  E78.00 425.7 CK      METABOLIC PANEL, COMPREHENSIVE      LIPID PANEL   5. IGT (impaired glucose tolerance)  R73.02 790.22 HEMOGLOBIN A1C WITH EAG   6. Schizophrenia, schizo-affective ([facility] [address]  F25.9 295.70    7. Vitamin D deficiency  E55.9 268.9 VITAMIN D, 25 HYDROXY   8. Encounter for immunization  Z23 V03.89 PNEUMOCOCCAL POLYSACCHARIDE VACCINE, 23-VALENT, ADULT OR IMMUNOSUPPRESSED PT DOSE,   9. Screening mammogram for breast cancer  Z12.31 V76.12 ERIC 3D MAL W MAMMO BI SCREENING INCL [facility]. Eye exam, routine  Z01.00 V72.0 REFERRAL TO OPHTHALMOLOGY     Follow-up and Dispositions    · Return in about 6 months (around 9/16/2022), or if symptoms worsen or fail to improve, for cholesterol, thyroid. results and schedule of future studies reviewed with patient  reviewed diet, exercise and weight   very strongly urged to quit smoking to reduce cardiovascular risk  cardiovascular risk and specific lipid/LDL goals reviewed  reviewed medications and side effects in detail     Mammogram  Eye exam  Pneumovax 23    Depression Risk Factor Screening     3 most recent PHQ Screens 3/16/2022   PHQ Not Done Functional capacity motivation limits accuracy   Little interest or pleasure in doing things -   Feeling down, depressed, irritable, or hopeless -   Total Score PHQ 2 -     Stable.     Alcohol & Drug Abuse Risk Screen    Do you average more than 1 drink per night or more than 7 drinks a week:  No    On any one occasion in the past three months have you have had more than 3 drinks containing alcohol:  No          Functional Ability and Level of Safety    Hearing: Hearing is good. Activities of Daily Living: The home contains: no safety equipment. Patient does total self care      Ambulation: with no difficulty     Fall Risk:  Fall Risk Assessment, last 12 mths 3/16/2022   Able to walk? Yes   Fall in past 12 months? 0   Do you feel unsteady? 1   Are you worried about falling 1   Number of falls in past 12 months -   Fall with injury? -      Abuse Screen:  Patient is not abused       Cognitive Screening    Has your family/caregiver stated any concerns about your memory: yes - but at baseline     Stable. Health Maintenance Due     Health Maintenance Due   Topic Date Due    Pneumococcal 0-64 years (1 of 2 - PPSV23) Never done    Shingrix Vaccine Age 49> (1 of 2) Never done    Depression Screen  12/30/2021       Patient Care Team   Patient Care Team:  Karina Helm MD as PCP - General (Pediatric Medicine)  [facility] Hel[facility]  Yvonne Wade MD (Family Medicine)  Karina Helm MD (Pediatric Medicine)    History     Patient Active Problem List   Diagnosis Code    Schizophrenia, schizo-affective ([facility] [address] F25.9    Hypothyroidism E03.9    GERD (gastroesophageal reflux disease) K21.9    Kidney tumor (benign) D30.00    Allergic rhinitis J30.9    Hypercholesterolemia E78.00    IGT (impaired glucose tolerance) R73.02    Vitamin D deficiency E55.9    Eczema L30.9    Contusion of abdominal wall S30. 1XXA    Contusion of right breast M43.73WF    Acute folliculitis N26.8    Urinary incontinence R32    Dysphagia R13.10    COVID-19 U07.1     Past Medical History:   Diagnosis Date    Allergic rhinitis     Brain atrophy ([facility] [address] 02/2018    Colon polyps     COVID-19 01/21/2021    GERD (gastroesophageal reflux disease)     H/O colonoscopy     History of Papanicolaou smear of cervix 2014    Hypercholesterolemia     hypercholesterolemia    Hypothyroidism     IGT (impaired glucose tolerance)     Kidney tumor (benign)     removed in childhood     Other ill-defined conditions(799.89)     right shoulder bursitis    Psychiatric disorder     schizophrenia    Schizophrenia, schizo-affective ([facility] [address]     Thyroid disease       Past Surgical History:   Procedure Laterality Date    COLONOSCOPY Left 10/1/2019    COLONOSCOPY performed by Clif Drummond MD at [facility] ENDOSCOPY    HX UROLOGICAL      tumor removal as child kidney     Current Outpatient Medications   Medication Sig Dispense Refill    traZODone (DESYREL) 50 mg tablet Take 0.5 Tablets by mouth nightly. 30 Tablet 5    Myrbetriq 50 mg ER tablet TAKE 1 TABLET BY MOUTH EVERY DAY 30 Tablet 5    memantine (NAMENDA) 10 mg tablet TAKE 1 TABLET BY MOUTH EVERY DAY 30 Tablet 5    pantoprazole (PROTONIX) 40 mg tablet TAKE 1 TABLET BY MOUTH EVERY DAY 90 Tablet 1    levothyroxine (SYNTHROID) 50 mcg tablet TAKE 1 TABLET BY MOUTH EVERY MORNING BEFORE breakfast 90 Tablet 1    cholecalciferol (VITAMIN D3) (1000 Units /25 mcg) tablet TAKE 2 TABLETS BY MOUTH EVERY DAY 60 Tablet PRN    fenofibrate (LOFIBRA) 160 mg tablet TAKE 1 TABLET BY MOUTH EVERY DAY 30 Tablet 11    pravastatin (PRAVACHOL) 80 mg tablet TAKE 1 TABLET BY MOUTH AT BEDTIME 30 Tablet 11    ibuprofen (MOTRIN) 600 mg tablet Take 1 Tab by mouth every six (6) hours as needed for Pain. 20 Tab 0    zinc sulfate (ZINCATE) 220 (50) mg capsule   99    QUEtiapine SR (SEROQUEL XR) 300 mg sr tablet Take 300 mg by mouth nightly.  haloperidol (HALDOL) 5 mg tablet Take 5 mg by mouth every morning.  melatonin 3 mg tablet Take  by mouth.  OLANZapine (ZYPREXA) 5 mg tablet Take  by mouth nightly.       LORazepam (ATIVAN) 1 mg tablet Take 1 Tab by mouth daily as needed (procedural anxiolysis). 3 Tab 0    haloperidol (HALDOL) 10 mg tablet Take 10 mg by mouth nightly.  clotrimazole (LOTRIMIN) 1 % topical cream Apply  to affected area two (2) times a day. X 7-10 days 45 g 1    PARoxetine (PAXIL) 20 mg tablet Take 10 mg by mouth daily. Indications: should be 10 mg      ibuprofen (MOTRIN) 400 mg tablet Take 1 Tab by mouth every eight (8) hours as needed for Pain. 30 Tab 1    divalproex ER (DEPAKOTE ER) 500 mg ER tablet Take 500 mg by mouth two (2) times a day.  cyclobenzaprine (FLEXERIL) 10 mg tablet TAKE 1/2 TABLET BY MOUTH THREE TIMES DAILY AS NEEDED FOR MUSCLE SPASMS (Patient not taking: Reported on 3/16/2022) 20 Tablet 0    triamcinolone acetonide (KENALOG) 0.1 % topical cream Apply  to affected area two (2) times a day. use thin layer as directed x 3-5 days prn (Patient not taking: Reported on 10/21/2021) 30 g 1    naproxen sodium (NAPROSYN) 220 mg tablet Take 220 mg by mouth two (2) times daily as needed. (Patient not taking: Reported on 3/16/2022)      permethrin (ACTICIN) 5 % topical cream Apply  to affected area now. apply sparingly as directed (Patient not taking: Reported on 8/9/2021)      sennosides (PERDIEM OVERNIGHT RELIEF) 15 mg Tab Take  by mouth.  (Patient not taking: Reported on 11/2/2021)       Allergies   Allergen Reactions    Bee Sting [Sting, Bee] Unknown (comments)     Patient states this is not an allergy      Bees [Hymenoptera Allergenic Extract] Other (comments)    Ragweed Unknown (comments)    Risperidone Other (comments)       Family History   Family history unknown: Yes     Social History     Tobacco Use    Smoking status: Current Every Day Smoker     Packs/day: 0.25     Years: 30.00     Pack years: 7.50    Smokeless tobacco: Never Used   Substance Use Topics    Alcohol use: No         Anali Camarena MD

## 2022-03-16 NOTE — PROGRESS NOTES
rm 15    Not fasting    Chief Complaint   Patient presents with   [employer] Annual Wellness Visit     1. Have you been to the ER, urgent care clinic since your last visit? Hospitalized since your last visit? No    2. Have you seen or consulted any other health care providers outside of the [address] [place] [address] since your last visit? Include any pap smears or colon screening.  No     Visit Vitals  /74 (BP 1 Location: Left arm, BP Patient Position: Sitting, BP Cuff Size: Adult)   Pulse 94   Temp 97.8 °F (36.6 °C) (Oral)   Resp 18   Ht 5' 1\" (1.549 m)   Wt 134 lb (60.8 kg)   SpO2 95%   BMI 25.32 kg/m²

## 2022-03-16 NOTE — PATIENT INSTRUCTIONS
Medicare Wellness Visit, Female     The best way to live healthy is to have a lifestyle where you eat a well-balanced diet, exercise regularly, limit alcohol use, and quit all forms of tobacco/nicotine, if applicable. Regular preventive services are another way to keep healthy. Preventive services (vaccines, screening tests, monitoring & exams) can help personalize your care plan, which helps you manage your own care. Screening tests can find health problems at the earliest stages, when they are easiest to treat. [facility] follows the current, evidence-based guidelines published by the [facility] ([facility]) when recommending preventive services for our patients. Because we follow these guidelines, sometimes recommendations change over time as research supports it. (For example, mammograms used to be recommended annually. Even though Medicare will still pay for an annual mammogram, the newer guidelines recommend a mammogram every two years for women of average risk). Of course, you and your doctor may decide to screen more often for some diseases, based on your risk and your co-morbidities (chronic disease you are already diagnosed with). Preventive services for you include:  - Medicare offers their members a free annual wellness visit, which is time for you and your primary care provider to discuss and plan for your preventive service needs. Take advantage of this benefit every year!  -All adults over the age of 72 should receive the recommended pneumonia vaccines. Current USPSTF guidelines recommend a series of two vaccines for the best pneumonia protection.   -All adults should have a flu vaccine yearly and a tetanus vaccine every 10 years.   -All adults age 48 and older should receive the shingles vaccines (series of two vaccines).       -All adults age 38-68 who are overweight should have a diabetes screening test once every three years.   -All adults born between [birth year] and 1963 should be screened once for Hepatitis C.  -Other screening tests and preventive services for persons with diabetes include: an eye exam to screen for diabetic retinopathy, a kidney function test, a foot exam, and stricter control over your cholesterol.   -Cardiovascular screening for adults with routine risk involves an electrocardiogram (ECG) at intervals determined by your doctor.   -Colorectal cancer screenings should be done for adults age 54-65 with no increased risk factors for colorectal cancer. There are a number of acceptable methods of screening for this type of cancer. Each test has its own benefits and drawbacks. Discuss with your doctor what is most appropriate for you during your annual wellness visit. The different tests include: colonoscopy (considered the best screening method), a fecal occult blood test, a fecal DNA test, and sigmoidoscopy.    -A bone mass density test is recommended when a woman turns 65 to screen for osteoporosis. This test is only recommended one time, as a screening. Some providers will use this same test as a disease monitoring tool if you already have osteoporosis. -Breast cancer screenings are recommended every other year for women of normal risk, age 54-69.  -Cervical cancer screenings for women over age 72 are only recommended with certain risk factors.      Here is a list of your current Health Maintenance items (your personalized list of preventive services) with a due date:  Health Maintenance Due   Topic Date Due    Pneumococcal Vaccine (1 of 2 - PPSV23) Never done    Shingles Vaccine (1 of 2) Never done    Depresssion Screening  12/30/2021       Ask pharmacy about shingles vaccines    apiOmat username = [id number]

## 2022-03-18 PROBLEM — U07.1 COVID-19: Status: ACTIVE | Noted: 2021-01-21

## 2022-03-18 PROBLEM — R13.10 DYSPHAGIA: Status: ACTIVE | Noted: 2018-10-31

## 2022-03-19 PROBLEM — S30.1XXA CONTUSION OF ABDOMINAL WALL: Status: ACTIVE | Noted: 2017-04-25

## 2022-03-19 PROBLEM — L73.9 ACUTE FOLLICULITIS: Status: ACTIVE | Noted: 2018-05-21

## 2022-03-19 PROBLEM — L30.9 ECZEMA: Status: ACTIVE | Noted: 2017-03-17

## 2022-03-19 PROBLEM — R32 URINARY INCONTINENCE: Status: ACTIVE | Noted: 2018-10-31

## 2022-03-20 PROBLEM — S20.01XA CONTUSION OF RIGHT BREAST: Status: ACTIVE | Noted: 2017-04-25

## 2022-04-19 LAB
25(OH)D3+25(OH)D2 SERPL-MCNC: 50.9 NG/ML (ref 30–100)
ALBUMIN SERPL-MCNC: 3.6 G/DL (ref 3.8–4.9)
ALBUMIN/GLOB SERPL: 1.5 {RATIO} (ref 1.2–2.2)
ALP SERPL-CCNC: 88 IU/L (ref 44–121)
ALT SERPL-CCNC: 12 IU/L (ref 0–32)
AST SERPL-CCNC: 42 IU/L (ref 0–40)
BASOPHILS # BLD AUTO: 0 X10E3/UL (ref 0–0.2)
BASOPHILS NFR BLD AUTO: 0 %
BILIRUB SERPL-MCNC: 0.5 MG/DL (ref 0–1.2)
BUN SERPL-MCNC: 12 MG/DL (ref 8–27)
BUN/CREAT SERPL: 16 (ref 12–28)
CALCIUM SERPL-MCNC: 9.4 MG/DL (ref 8.7–10.3)
CHLORIDE SERPL-SCNC: 103 MMOL/L (ref 96–106)
CHOLEST SERPL-MCNC: 145 MG/DL (ref 100–199)
CK SERPL-CCNC: 50 U/L (ref 32–182)
CO2 SERPL-SCNC: 22 MMOL/L (ref 20–29)
CREAT SERPL-MCNC: 0.75 MG/DL (ref 0.57–1)
EOSINOPHIL # BLD AUTO: 0 X10E3/UL (ref 0–0.4)
EOSINOPHIL NFR BLD AUTO: 0 %
ERYTHROCYTE [DISTWIDTH] IN BLOOD BY AUTOMATED COUNT: 13.3 % (ref 11.7–15.4)
EST. AVERAGE GLUCOSE BLD GHB EST-MCNC: 111 MG/DL
GLOBULIN SER CALC-MCNC: 2.4 G/DL (ref 1.5–4.5)
GLUCOSE SERPL-MCNC: 83 MG/DL (ref 65–99)
HBA1C MFR BLD: 5.5 % (ref 4.8–5.6)
HCT VFR BLD AUTO: 37.7 % (ref 34–46.6)
HDLC SERPL-MCNC: 43 MG/DL
HGB BLD-MCNC: 12.2 G/DL (ref 11.1–15.9)
IMM GRANULOCYTES # BLD AUTO: 0 X10E3/UL (ref 0–0.1)
IMM GRANULOCYTES NFR BLD AUTO: 1 %
LDLC SERPL CALC-MCNC: 80 MG/DL (ref 0–99)
LYMPHOCYTES # BLD AUTO: 1.7 X10E3/UL (ref 0.7–3.1)
LYMPHOCYTES NFR BLD AUTO: 29 %
MCH RBC QN AUTO: 31.9 PG (ref 26.6–33)
MCHC RBC AUTO-ENTMCNC: 32.4 G/DL (ref 31.5–35.7)
MCV RBC AUTO: 99 FL (ref 79–97)
MONOCYTES # BLD AUTO: 0.9 X10E3/UL (ref 0.1–0.9)
MONOCYTES NFR BLD AUTO: 15 %
NEUTROPHILS # BLD AUTO: 3.2 X10E3/UL (ref 1.4–7)
NEUTROPHILS NFR BLD AUTO: 55 %
PLATELET # BLD AUTO: 237 X10E3/UL (ref 150–450)
POTASSIUM SERPL-SCNC: 5 MMOL/L (ref 3.5–5.2)
PROT SERPL-MCNC: 6 G/DL (ref 6–8.5)
RBC # BLD AUTO: 3.82 X10E6/UL (ref 3.77–5.28)
SODIUM SERPL-SCNC: 140 MMOL/L (ref 134–144)
T4 FREE SERPL-MCNC: 1.16 NG/DL (ref 0.82–1.77)
TRIGL SERPL-MCNC: 122 MG/DL (ref 0–149)
TSH SERPL DL<=0.005 MIU/L-ACNC: 3.7 UIU/ML (ref 0.45–4.5)
VLDLC SERPL CALC-MCNC: 22 MG/DL (ref 5–40)
WBC # BLD AUTO: 5.9 X10E3/UL (ref 3.4–10.8)

## 2022-04-21 DIAGNOSIS — K21.9 GASTROESOPHAGEAL REFLUX DISEASE WITHOUT ESOPHAGITIS: ICD-10-CM

## 2022-04-21 DIAGNOSIS — E03.4 HYPOTHYROIDISM DUE TO ACQUIRED ATROPHY OF THYROID: ICD-10-CM

## 2022-04-21 RX ORDER — LEVOTHYROXINE SODIUM 50 UG/1
TABLET ORAL
Qty: 90 TABLET | Refills: 1 | Status: SHIPPED | OUTPATIENT
Start: 2022-04-21 | End: 2022-09-08

## 2022-04-21 RX ORDER — PANTOPRAZOLE SODIUM 40 MG/1
TABLET, DELAYED RELEASE ORAL
Qty: 90 TABLET | Refills: 1 | Status: SHIPPED | OUTPATIENT
Start: 2022-04-21 | End: 2022-09-08

## 2022-05-26 ENCOUNTER — TELEPHONE (OUTPATIENT)
Dept: INTERNAL MEDICINE CLINIC | Age: 62
End: 2022-05-26

## 2022-05-26 DIAGNOSIS — E03.4 HYPOTHYROIDISM DUE TO ACQUIRED ATROPHY OF THYROID: Primary | ICD-10-CM

## 2022-05-26 DIAGNOSIS — R63.4 WEIGHT LOSS: ICD-10-CM

## 2022-05-26 NOTE — TELEPHONE ENCOUNTER
Patient asking for her Thyroid to be tested due to weight lost( 30)pds. within the last 3 months.    Last visit-3-13-22

## 2022-06-02 ENCOUNTER — OFFICE VISIT (OUTPATIENT)
Dept: INTERNAL MEDICINE CLINIC | Age: 62
End: 2022-06-02
Payer: MEDICARE

## 2022-06-02 VITALS
RESPIRATION RATE: 16 BRPM | DIASTOLIC BLOOD PRESSURE: 74 MMHG | WEIGHT: 130 LBS | HEIGHT: 62 IN | BODY MASS INDEX: 23.92 KG/M2 | SYSTOLIC BLOOD PRESSURE: 109 MMHG | HEART RATE: 86 BPM | TEMPERATURE: 98.1 F | OXYGEN SATURATION: 98 %

## 2022-06-02 DIAGNOSIS — F25.9 SCHIZOPHRENIA, SCHIZO-AFFECTIVE (HCC): ICD-10-CM

## 2022-06-02 DIAGNOSIS — R63.4 WEIGHT LOSS: ICD-10-CM

## 2022-06-02 DIAGNOSIS — E03.4 HYPOTHYROIDISM DUE TO ACQUIRED ATROPHY OF THYROID: ICD-10-CM

## 2022-06-02 DIAGNOSIS — R63.4 UNINTENTIONAL WEIGHT LOSS: ICD-10-CM

## 2022-06-02 DIAGNOSIS — R00.2 PALPITATIONS: Primary | ICD-10-CM

## 2022-06-02 PROCEDURE — G8420 CALC BMI NORM PARAMETERS: HCPCS | Performed by: INTERNAL MEDICINE

## 2022-06-02 PROCEDURE — 3017F COLORECTAL CA SCREEN DOC REV: CPT | Performed by: INTERNAL MEDICINE

## 2022-06-02 PROCEDURE — G9899 SCRN MAM PERF RSLTS DOC: HCPCS | Performed by: INTERNAL MEDICINE

## 2022-06-02 PROCEDURE — 99214 OFFICE O/P EST MOD 30 MIN: CPT | Performed by: INTERNAL MEDICINE

## 2022-06-02 PROCEDURE — G8427 DOCREV CUR MEDS BY ELIG CLIN: HCPCS | Performed by: INTERNAL MEDICINE

## 2022-06-02 PROCEDURE — G8510 SCR DEP NEG, NO PLAN REQD: HCPCS | Performed by: INTERNAL MEDICINE

## 2022-06-02 PROCEDURE — 93000 ELECTROCARDIOGRAM COMPLETE: CPT | Performed by: INTERNAL MEDICINE

## 2022-06-02 NOTE — PROGRESS NOTES
rm 20    Episode of heart pain/palpitations  Weight loss  Psych provider told her it could be from thyroid    Chief Complaint   Patient presents with    Palpitations    Weight Loss     1. Have you been to the ER, urgent care clinic since your last visit? Hospitalized since your last visit? No    2. Have you seen or consulted any other health care providers outside of the [address] [place] [address] since your last visit? Include any pap smears or colon screening.  Yes [facility]     Visit Vitals  /74 (BP 1 Location: Left arm, BP Patient Position: Sitting, BP Cuff Size: Adult)   Pulse 86   Temp 98.1 °F (36.7 °C) (Oral)   Resp 16   Ht 5' 2\" (1.575 m)   Wt 130 lb (59 kg)   SpO2 98%   BMI 23.78 kg/m²

## 2022-06-02 NOTE — PROGRESS NOTES
Batsheva Ramirez (: 1960) is a 63 y.o. female, established patient, here for evaluation of the following chief complaint(s):  Chief Complaint   Patient presents with    Palpitations    Weight Loss       Assessment and Plan:       ICD-10-CM ICD-9-CM    1. Palpitations  R00.2 785.1 AMB POC EKG ROUTINE W/ 12 LEADS, INTER & REP   2. Unintentional weight loss  R63.4 783.21    3. Hypothyroidism due to acquired atrophy of thyroid  E03.4 244.8 TSH 3RD GENERATION     246.8 T4, FREE   4. Schizophrenia, schizo-affective ([facility] [address]  F25.9 295.70    5. Weight loss  R63.4 783.21 SED RATE (ESR)      METABOLIC PANEL, COMPREHENSIVE      C REACTIVE PROTEIN, QT      CBC WITH AUTOMATED DIFF       1. EKG and mgt reviewed at visit. 2,3:  Labs ordered by PCP (lab orders for diagnoses #3 and #5) done at visit today, as reviewed. 4.  Mgt reviewed with pt and CW at visit. Follow-up and Dispositions    · Return for medication follow-up, weight follow-up--as scheduled. .       lab results and schedule of future lab studies reviewed with patient  reviewed medications and side effects in detail    For additional documentation of information and/or recommendations discussed this visit, please see notes in instructions. Plan and evaluation (above) reviewed with pt/CW at visit  Patient/CW voiced understanding of plan and provided with time to ask/review questions. After Visit Summary (AVS) provided to pt/CW after visit with additional instructions as needed/reviewed. Future Appointments   Date Time Provider Parmjit Contreras   2022 10:30 AM Eddie Yeh MD CP BS AMB   --Updated future visits after patient check-out. History of Present Illness:     Notes (nursing/rooming note copied below in italics):  Episode of heart pain/palpitations  Weight loss  Psych provider told her it could be from thyroid    PCP:  Eddie Yeh MD    Here to evaluate above. EKG done and reviewed with pt and CW.      Debo Contreras had requested labs 5/26 for weight loss with phone encounter. They are able to do today. Lab Results   Component Value Date/Time    TSH 3.700 04/18/2022 09:42 AM    T4, Free 1.16 04/18/2022 09:42 AM     He has ordered labs above for evaluation. History as above from pt and CW. She will have some problems with intake/consuming calories at times. Vitals 6/2/2022 3/16/2022 11/2/2021 10/21/2021 8/9/2021   Weight 130 lb 134 lb 150 lb 6.4 oz 148 lb 12.8 oz 154 lb 9.6 oz     Vitals 12/30/2020   Weight 159 lb       They do not want to do nurtrition or GI referral at this time--will just await plan pending labs. Had one episode of plapitations 2wks ago. She had end of day prior to going to bed. She was not asleep, but told them felt palpitations. CW checked pulse ox and normal.    Nursing screenings reviewed by provider at visit. Past Medical History:   Diagnosis Date    Allergic rhinitis     Brain atrophy ([facility] [address] 02/2018    Colon polyps     COVID-19 01/21/2021    GERD (gastroesophageal reflux disease)     H/O colonoscopy     History of Papanicolaou smear of cervix 2014    Hypercholesterolemia     hypercholesterolemia    Hypothyroidism     IGT (impaired glucose tolerance)     Kidney tumor (benign)     removed in childhood     Other ill-defined conditions(799.89)     right shoulder bursitis    Psychiatric disorder     schizophrenia    Schizophrenia, schizo-affective ([facility] [address]     Thyroid disease      Past Surgical History:   Procedure Laterality Date    COLONOSCOPY Left 10/1/2019    COL[facility] ENDOSCOPY    HX UROLOGICAL      tumor removal as child kidney        Prior to Admission medications    Medication Sig Start Date End Date Taking?  Authorizing Provider   pantoprazole (PROTONIX) 40 mg tablet TAKE 1 TABLET BY MOUTH EVERY DAY 4/21/22  Yes Jad Durand MD   levothyroxine (SYNTHROID) 50 mcg tablet TAKE 1 TABLET BY MOUTH EVERY MORNING BEFORE breakfast 4/21/22  Yes Theresa Cristobal MD   Myrbetriq 50 mg ER tablet TAKE 1 TABLET BY MOUTH EVERY DAY 1/20/22  Yes Theresa Cristobal MD   memantine [facility]) 10 mg tablet TAKE 1 TABLET BY MOUTH EVERY DAY 12/21/21  Yes Theresa Cristobal MD   cholecalciferol (VITAMIN D3) (1000 Units /25 mcg) tablet TAKE 2 TABLETS BY MOUTH EVERY DAY 8/24/21  Yes Theresa Cristobal MD   fenofibrate (LOFIBRA) 160 mg tablet TAKE 1 TABLET BY MOUTH EVERY DAY 7/23/21  Yes Theresa Cristobal MD   pravastatin (PRAVACHOL) 80 mg tablet TAKE 1 TABLET BY MOUTH AT BEDTIME 7/23/21  Yes Theresa Cristobal MD   cyclobenzaprine (FLEXERIL) 10 mg tablet TAKE 1/2 TABLET BY MOUTH THREE TIMES DAILY AS NEEDED FOR MUSCLE SPASMS 6/9/21  Yes Theresa Cristobal MD   zinc sulfate (ZINCATE) 220 (50) mg capsule  7/1/19  Yes Provider, Historical   QUEtiapine SR (SEROQUEL XR) 300 mg sr tablet Take 300 mg by mouth nightly. Yes Provider, Historical   haloperidol (HALDOL) 5 mg tablet Take 5 mg by mouth every morning. 5/1/18  Yes Provider, Historical   melatonin 3 mg tablet Take  by mouth. Yes Provider, Historical   OLANZapine (ZYPREXA) 5 mg tablet Take  by mouth nightly. Yes Provider, Historical   LORazepam (ATIVAN) 1 mg tablet Take 1 Tab by mouth daily as needed (procedural anxiolysis). 2/16/18  Yes Theresa Cristobal MD   haloperidol (HALDOL) 10 mg tablet Take 10 mg by mouth nightly. 12/1/17  Yes Provider, Historical   naproxen sodium (NAPROSYN) 220 mg tablet Take 220 mg by mouth two (2) times daily as needed. Yes Provider, Historical   PARoxetine (PAXIL) 20 mg tablet Take 10 mg by mouth daily. Indications: should be 10 mg   Yes Provider, Historical   divalproex ER (DEPAKOTE ER) 500 mg ER tablet Take 500 mg by mouth two (2) times a day. Yes Provider, Historical   traZODone (DESYREL) 50 mg tablet Take 0.5 Tablets by mouth nightly.   Patient not taking: Reported on 6/2/2022 3/1/22   Theresa Cristobal MD   ibuprofen (MOTRIN) 600 mg tablet Take 1 Tab by mouth every six (6) hours as needed for Pain. Patient not taking: Reported on 6/2/2022 10/11/20   Laith Contreras MD   triamcinolone acetonide (KENALOG) 0.1 % topical cream Apply  to affected area two (2) times a day. use thin layer as directed x 3-5 days prn  Patient not taking: Reported on 10/21/2021 3/17/17   Jad Durand MD   clotrimazole (LOTRIMIN) 1 % topical cream Apply  to affected area two (2) times a day. X 7-10 days  Patient not taking: Reported on 6/2/2022 3/17/17   Jad Durand MD   ibuprofen (MOTRIN) 400 mg tablet Take 1 Tab by mouth every eight (8) hours as needed for Pain. Patient not taking: Reported on 6/2/2022 1/29/16   Jad Durand MD   permethrin (ACTICIN) 5 % topical cream Apply  to affected area now. apply sparingly as directed  Patient not taking: Reported on 8/9/2021    Provider, Historical   sennosides (PERDIEM OVERNIGHT RELIEF) 15 mg Tab Take  by mouth. Patient not taking: Reported on 11/2/2021    Provider, Historical        ROS    Vitals:    06/02/22 1007   BP: 109/74   Pulse: 86   Resp: 16   Temp: 98.1 °F (36.7 °C)   TempSrc: Oral   SpO2: 98%   Weight: 130 lb (59 kg)   Height: 5' 2\" (1.575 m)      Body mass index is 23.78 kg/m². Physical Exam:     Physical Exam  Vitals and nursing note reviewed. Constitutional:       General: She is not in acute distress. Appearance: Normal appearance. She is well-developed. She is not diaphoretic. HENT:      Head: Normocephalic and atraumatic. Eyes:      General: No scleral icterus. Right eye: No discharge. Left eye: No discharge. Conjunctiva/sclera: Conjunctivae normal.   Cardiovascular:      Rate and Rhythm: Normal rate and regular rhythm. Pulses: Normal pulses. Heart sounds: Normal heart sounds. No murmur heard. No friction rub. No gallop. Pulmonary:      Effort: Pulmonary effort is normal. No respiratory distress. Breath sounds: Normal breath sounds. No stridor.  No wheezing, rhonchi or rales. Abdominal:      General: Bowel sounds are normal. There is no distension. Palpations: Abdomen is soft. Tenderness: There is no abdominal tenderness. There is no guarding. Musculoskeletal:         General: No swelling, tenderness, deformity or signs of injury. Right lower leg: No edema. Left lower leg: No edema. Skin:     General: Skin is warm. Coloration: Skin is not jaundiced or pale. Findings: No bruising, erythema or rash. Neurological:      General: No focal deficit present. Mental Status: She is alert. Motor: No abnormal muscle tone. Coordination: Coordination normal.      Gait: Gait normal.   Psychiatric:         Mood and Affect: Mood normal.         Behavior: Behavior normal.         Thought Content: Thought content normal.         Judgment: Judgment normal.       EKG with NSR at 86bpm.  No ST-T changes. Intervals normal.      An electronic signature was used to authenticate this note.   -- Mily Gutierrez MD

## 2022-06-03 LAB
ALBUMIN SERPL-MCNC: 3.3 G/DL (ref 3.5–5)
ALBUMIN/GLOB SERPL: 1.1 {RATIO} (ref 1.1–2.2)
ALP SERPL-CCNC: 102 U/L (ref 45–117)
ALT SERPL-CCNC: 16 U/L (ref 12–78)
ANION GAP SERPL CALC-SCNC: 5 MMOL/L (ref 5–15)
AST SERPL-CCNC: 37 U/L (ref 15–37)
BASOPHILS # BLD: 0 K/UL (ref 0–0.1)
BASOPHILS NFR BLD: 0 % (ref 0–1)
BILIRUB SERPL-MCNC: 0.5 MG/DL (ref 0.2–1)
BUN SERPL-MCNC: 16 MG/DL (ref 6–20)
BUN/CREAT SERPL: 23 (ref 12–20)
CALCIUM SERPL-MCNC: 9.4 MG/DL (ref 8.5–10.1)
CHLORIDE SERPL-SCNC: 101 MMOL/L (ref 97–108)
CO2 SERPL-SCNC: 28 MMOL/L (ref 21–32)
CREAT SERPL-MCNC: 0.7 MG/DL (ref 0.55–1.02)
CRP SERPL-MCNC: <0.29 MG/DL (ref 0–0.6)
DIFFERENTIAL METHOD BLD: ABNORMAL
EOSINOPHIL # BLD: 0 K/UL (ref 0–0.4)
EOSINOPHIL NFR BLD: 0 % (ref 0–7)
ERYTHROCYTE [DISTWIDTH] IN BLOOD BY AUTOMATED COUNT: 14.6 % (ref 11.5–14.5)
ERYTHROCYTE [SEDIMENTATION RATE] IN BLOOD: 2 MM/HR (ref 0–30)
GLOBULIN SER CALC-MCNC: 2.9 G/DL (ref 2–4)
GLUCOSE SERPL-MCNC: 91 MG/DL (ref 65–100)
HCT VFR BLD AUTO: 38.5 % (ref 35–47)
HGB BLD-MCNC: 12.2 G/DL (ref 11.5–16)
IMM GRANULOCYTES # BLD AUTO: 0 K/UL (ref 0–0.04)
IMM GRANULOCYTES NFR BLD AUTO: 0 % (ref 0–0.5)
LYMPHOCYTES # BLD: 2.1 K/UL (ref 0.8–3.5)
LYMPHOCYTES NFR BLD: 20 % (ref 12–49)
MCH RBC QN AUTO: 33.2 PG (ref 26–34)
MCHC RBC AUTO-ENTMCNC: 31.7 G/DL (ref 30–36.5)
MCV RBC AUTO: 104.9 FL (ref 80–99)
MONOCYTES # BLD: 0.9 K/UL (ref 0–1)
MONOCYTES NFR BLD: 8 % (ref 5–13)
NEUTS SEG # BLD: 7.6 K/UL (ref 1.8–8)
NEUTS SEG NFR BLD: 72 % (ref 32–75)
NRBC # BLD: 0 K/UL (ref 0–0.01)
NRBC BLD-RTO: 0 PER 100 WBC
PLATELET # BLD AUTO: 241 K/UL (ref 150–400)
PMV BLD AUTO: 11.1 FL (ref 8.9–12.9)
POTASSIUM SERPL-SCNC: 4.6 MMOL/L (ref 3.5–5.1)
PROT SERPL-MCNC: 6.2 G/DL (ref 6.4–8.2)
RBC # BLD AUTO: 3.67 M/UL (ref 3.8–5.2)
SODIUM SERPL-SCNC: 134 MMOL/L (ref 136–145)
T4 FREE SERPL-MCNC: 0.9 NG/DL (ref 0.8–1.5)
TSH SERPL DL<=0.05 MIU/L-ACNC: 1.57 UIU/ML (ref 0.36–3.74)
WBC # BLD AUTO: 10.7 K/UL (ref 3.6–11)

## 2022-06-14 RX ORDER — MEMANTINE HYDROCHLORIDE 10 MG/1
10 TABLET ORAL DAILY
Qty: 30 TABLET | Refills: 5 | Status: SHIPPED | OUTPATIENT
Start: 2022-06-14 | End: 2022-11-04 | Stop reason: SDUPTHER

## 2022-06-14 NOTE — TELEPHONE ENCOUNTER
Last visit 06/02/2022 MD Hiral Mejia   Next appointment 09/16/2022 MD Hiral Mejia   Previous refill encounter(s)   12/21/2021 Namenda #30 with 5 refills. For Pharmacy Admin Tracking Only     Intervention Detail: New Rx: 1, reason: Patient Preference   Time Spent (min): 5        Requested Prescriptions     Pending Prescriptions Disp Refills    memantine (NAMENDA) 10 mg tablet 30 Tablet 5     Sig: Take 1 Tablet by mouth daily.

## 2022-07-28 RX ORDER — MEMANTINE HYDROCHLORIDE 10 MG/1
10 TABLET ORAL DAILY
Qty: 30 TABLET | Refills: 5 | Status: CANCELLED | OUTPATIENT
Start: 2022-07-28

## 2022-07-28 NOTE — TELEPHONE ENCOUNTER
Memantine 10mg last filled 6/14/22. (30 and 5 refills, 6 month supply)  Too soon for refill. Will call residence and advise.

## 2022-07-28 NOTE — TELEPHONE ENCOUNTER
Verified with care provider that she has refills on file for med with [id number] [facility]. States they are transferring all meds to family [facility] and this may have been missed. Refilled today with family [facility]. Advised to contact kristy and have this med transferred. States she will call today.

## 2022-08-16 ENCOUNTER — TELEPHONE (OUTPATIENT)
Dept: PRIMARY CARE CLINIC | Age: 62
End: 2022-08-16

## 2022-08-16 NOTE — TELEPHONE ENCOUNTER
----- Message from [facility] sent at 8/10/2022 11:55 AM EDT -----  Subject: Message to Provider    QUESTIONS  Information for Provider?  Lee Dahl from the [facility] Service called to make an appt for the PT TB test. Please   return her call.  ---------------------------------------------------------------------------  --------------  [id number]  [phone]; OK to leave message on voicemail  ---------------------------------------------------------------------------  --------------  SCRIPT ANSWERS  undefined

## 2022-08-23 DIAGNOSIS — E03.4 HYPOTHYROIDISM DUE TO ACQUIRED ATROPHY OF THYROID: ICD-10-CM

## 2022-08-23 DIAGNOSIS — E55.9 VITAMIN D DEFICIENCY: ICD-10-CM

## 2022-08-23 DIAGNOSIS — E78.00 HYPERCHOLESTEROLEMIA: ICD-10-CM

## 2022-08-23 DIAGNOSIS — K21.9 GASTROESOPHAGEAL REFLUX DISEASE WITHOUT ESOPHAGITIS: ICD-10-CM

## 2022-08-29 ENCOUNTER — TELEPHONE (OUTPATIENT)
Dept: INTERNAL MEDICINE CLINIC | Age: 62
End: 2022-08-29

## 2022-08-29 DIAGNOSIS — Z11.1 SCREENING FOR TUBERCULOSIS: Primary | ICD-10-CM

## 2022-08-29 NOTE — TELEPHONE ENCOUNTER
----- Message from Danewilykanu Olmedo sent at 8/23/2022  2:19 PM EDT -----  Subject: Message to Provider    QUESTIONS  Information for Provider? Patient needs a TB test, please return the call.  ---------------------------------------------------------------------------  --------------  [id number]  [phone]; OK to leave message on voicemail  ---------------------------------------------------------------------------  --------------  SCRIPT ANSWERS  Relationship to Patient? Other  Representative Name? Baljit Davis  Is the Representative on the appropriate HIPAA document in Epic?  Yes

## 2022-08-29 NOTE — TELEPHONE ENCOUNTER
Spoke to pt caregiver. Pt still needs a TB test done. Please place order for TB gold blood test. Then pt will be scheduled for lab draw. No future appointments.

## 2022-09-08 RX ORDER — CHOLECALCIFEROL (VITAMIN D3) 25 MCG
TABLET ORAL
Qty: 60 TABLET | Refills: 0 | Status: SHIPPED | OUTPATIENT
Start: 2022-09-08

## 2022-09-08 RX ORDER — LEVOTHYROXINE SODIUM 50 UG/1
TABLET ORAL
Qty: 30 TABLET | Refills: 0 | Status: SHIPPED | OUTPATIENT
Start: 2022-09-08 | End: 2022-10-25

## 2022-09-08 RX ORDER — MIRABEGRON 50 MG/1
TABLET, FILM COATED, EXTENDED RELEASE ORAL
Qty: 90 TABLET | Refills: 0 | Status: SHIPPED | OUTPATIENT
Start: 2022-09-08

## 2022-09-08 RX ORDER — PRAVASTATIN SODIUM 80 MG/1
TABLET ORAL
Qty: 90 TABLET | Refills: 0 | Status: SHIPPED | OUTPATIENT
Start: 2022-09-08

## 2022-09-08 RX ORDER — PANTOPRAZOLE SODIUM 40 MG/1
TABLET, DELAYED RELEASE ORAL
Qty: 30 TABLET | Refills: 0 | Status: SHIPPED | OUTPATIENT
Start: 2022-09-08 | End: 2022-10-25

## 2022-09-08 RX ORDER — GLUCOSAMINE SULFATE 1500 MG
POWDER IN PACKET (EA) ORAL
Qty: 60 CAPSULE | Refills: 3 | Status: SHIPPED | OUTPATIENT
Start: 2022-09-08

## 2022-09-08 RX ORDER — FENOFIBRATE 160 MG/1
TABLET ORAL
Qty: 90 TABLET | Refills: 0 | Status: SHIPPED | OUTPATIENT
Start: 2022-09-08 | End: 2022-11-03

## 2022-09-08 NOTE — TELEPHONE ENCOUNTER
Pt has an appointment with Dr. Lu Rogers on 10/20/2022. Can pt have a refill until seen by new provider? Thank you. Duplicate request: Vitamin D3. System would not allow writer to delete request.     Last visit 06/02/2022 MD Michelle Henry   Next appointment 10/20/2022 & 11/03/2022 MD Chandler Rogers  Previous refill encounter(s)   07/23/2021:  - Pravachol #30 with 11 refills,   - Pedro Jest #30 with 11 refills,   08/24/2011 Vitamin D3 #60 with PRN refills,   01/20/20222 Myrbetriq #30 with 5 refills,   04/21/2022:  - Synthroid #90 with 1 refill,   - Protonix #90 with 1 refill.      For Pharmacy Admin Tracking Only    Intervention Detail: Discontinued Rx: 1, reason: Duplicate Therapy and New Rx: 6, reason: Patient Preference  Time Spent (min): 10      Requested Prescriptions     Pending Prescriptions Disp Refills    fenofibrate (LOFIBRA) 160 mg tablet [Pharmacy Med Name: FENOFIBRATE 160 MG TABLET] 90 Tablet 0     Sig: TAKE 1 TABLET BY MOUTH ONCE A DAY    levothyroxine (SYNTHROID) 50 mcg tablet [Pharmacy Med Name: LEVOTHYROXINE 50 MCG TABLET] 30 Tablet 0     Sig: TAKE 1 TAB BY MOUTH DAILY BEFORE BREAKFAST    pravastatin (PRAVACHOL) 80 mg tablet [Pharmacy Med Name: PRAVASTATIN SODIUM 80 MG TAB] 90 Tablet 0     Sig: TAKE 1 TABLET BY MOUTH EACH NIGHT AT BEDTIME    pantoprazole (PROTONIX) 40 mg tablet [Pharmacy Med Name: PANTOPRAZOLE SOD DR 40 MG TAB] 30 Tablet 0     Sig: TAKE 1 TABLET BY MOUTH ONCE A DAY    Myrbetriq 50 mg ER tablet [Pharmacy Med Name: Kaitlin Olszewski ER 50 MG TAB DNC] 90 Tablet 0     Sig: TAKE 1 TABLET BY MOUTH ONCE A DAY    cholecalciferol (VITAMIN D3) 25 mcg (1,000 unit) cap [Pharmacy Med Name: GNP VITAMIN D3 25 MCG TABLET]  0     Sig: TAKE 2 TABLETS BY MOUTH ONCE DAILY FOR NUTRITIONAL SUPPLEMENT    Vitamin D3 25 mcg (1,000 unit) tablet [Pharmacy Med Name: VITAMIN D3 25 MCG TABLET] 60 Tablet 0     Sig: TAKE 2 TABLETS BY MOUTH EVERY DAY

## 2022-09-10 LAB
GAMMA INTERFERON BACKGROUND BLD IA-ACNC: 0.02 IU/ML
M TB IFN-G BLD-IMP: NEGATIVE
M TB IFN-G CD4+ BCKGRND COR BLD-ACNC: 0.02 IU/ML
MITOGEN IGNF BLD-ACNC: 7.29 IU/ML
QUANTIFERON INCUBATION, QF1T: NORMAL
QUANTIFERON TB2 AG: 0.02 IU/ML
SERVICE CMNT-IMP: NORMAL

## 2022-10-20 ENCOUNTER — OFFICE VISIT (OUTPATIENT)
Dept: INTERNAL MEDICINE CLINIC | Age: 62
End: 2022-10-20
Payer: MEDICARE

## 2022-10-20 VITALS
RESPIRATION RATE: 16 BRPM | HEART RATE: 83 BPM | DIASTOLIC BLOOD PRESSURE: 72 MMHG | OXYGEN SATURATION: 98 % | HEIGHT: 62 IN | SYSTOLIC BLOOD PRESSURE: 104 MMHG | WEIGHT: 136.6 LBS | TEMPERATURE: 97.3 F | BODY MASS INDEX: 25.14 KG/M2

## 2022-10-20 DIAGNOSIS — D75.89 MACROCYTOSIS: ICD-10-CM

## 2022-10-20 DIAGNOSIS — F03.90 DEMENTIA, UNSPECIFIED DEMENTIA SEVERITY, UNSPECIFIED DEMENTIA TYPE, UNSPECIFIED WHETHER BEHAVIORAL, PSYCHOTIC, OR MOOD DISTURBANCE OR ANXIETY (HCC): ICD-10-CM

## 2022-10-20 DIAGNOSIS — Z00.00 ROUTINE PHYSICAL EXAMINATION: Primary | ICD-10-CM

## 2022-10-20 PROCEDURE — 99204 OFFICE O/P NEW MOD 45 MIN: CPT | Performed by: STUDENT IN AN ORGANIZED HEALTH CARE EDUCATION/TRAINING PROGRAM

## 2022-10-20 NOTE — PROGRESS NOTES
Good Help to Those in [facility]   Internal Medicine  [address] [facility] [address], 235 [facility]  [address]  [place], 200 [facility]  [phone]      Primary Care Visit Note    Assessment/Plan:   Irina Avila is a 63 y.o. female who presents to establish care    Patient arrived 32 minutes late for 30 min appointment slot for new patient visit. Schizophrenia:  - following every 3 mo with psychology  - currently listed as taking depakote, haldol, olanzapine, and paxil    Dementia:   - continue memantine   - referral to neurology for establishing care    Macrocytosis:   - start G82 and folic acid  - recheck labs at future visit, consider checking b12, folate    Hypovitaminosis D  - continue cholecalciferol    Hypercholesterolemia:  - last checked in 4/2022  - continue pravastatin 80 mg   - fenofibrate listed on med list but unclear if she is taking or not, will discontinue. Health maintenance:  - flu vaccine: will obtain from pharmacy  - covid booster: advised to obtain the latest booster. - colonoscopy: due 10/2022, ordered today  - shingrix:would like to hold off on shingles shot    Bennie Allen MD    CC:     Chief Complaint   Patient presents with    [facility] [place]         HPI:     Irina Avila is a 63 y.o. female who presents to establish care    Patient arrived 32 minutes late for 30 min appointment slot for new patient visit. Patient was previously seen by Mir Hein who recently passed away. Patient presents with caregiver, Praneeth Rodas, who provides most of the history. Most recent med list not with her and not clear which medications she is taking. Schizophrenia  - seeing psych every 3 months    Dementia:  - patient easily gets lost  - patient having difficulty following commands. - patient difficulty with memory. Macrocytosis:  - alcohol consumption: no  - nutrition: patient getting good nutrition, occasionally won't eat the food if she doesn't like it.    - taking folate and b12? Patient has been having weight fluctuating up and down. - thyroid checked and labs were normal  - she gets adequate nutrition      ROS:   All 10 point review of systems negative except those mentioned in the HPI. Past Medical History:      Active Ambulatory Problems     Diagnosis Date Noted    Schizophrenia, schizo-affective ([address]     Hypothyroidism     GERD (gastroesophageal reflux disease)     Kidney tumor (benign)     Allergic rhinitis     Hypercholesterolemia     IGT (impaired glucose tolerance)     Vitamin D deficiency 05/25/2016    Eczema 03/17/2017    Contusion of abdominal wall 04/25/2017    Contusion of right breast 84/55/4786    Acute folliculitis 68/48/0594    Urinary incontinence 10/31/2018    Dysphagia 10/31/2018    COVID-19 01/21/2021     Resolved Ambulatory Problems     Diagnosis Date Noted    No Resolved Ambulatory Problems     Past Medical History:   Diagnosis Date    Brain atrophy [address] [address] 02/2018    Colon polyps     H/O colonoscopy     History of Papanicolaou smear of cervix 2014    Other ill-defined conditions(799.89)     Psychiatric disorder     Thyroid disease           Current Medications:     Current Outpatient Medications:     fenofibrate (LOFIBRA) 160 mg tablet, TAKE 1 TABLET BY MOUTH ONCE A DAY, Disp: 90 Tablet, Rfl: 0    levothyroxine (SYNTHROID) 50 mcg tablet, TAKE 1 TAB BY MOUTH DAILY BEFORE BREAKFAST, Disp: 30 Tablet, Rfl: 0    pravastatin (PRAVACHOL) 80 mg tablet, TAKE 1 TABLET BY MOUTH EACH NIGHT AT BEDTIME, Disp: 90 Tablet, Rfl: 0    pantoprazole (PROTONIX) 40 mg tablet, TAKE 1 TABLET BY MOUTH ONCE A DAY, Disp: 30 Tablet, Rfl: 0    Myrbetriq 50 mg ER tablet, TAKE 1 TABLET BY MOUTH ONCE A DAY, Disp: 90 Tablet, Rfl: 0    cholecalciferol (VITAMIN D3) 25 mcg (1,000 unit) cap, TAKE 2 TABLETS BY MOUTH ONCE DAILY FOR NUTRITIONAL SUPPLEMENT, Disp: 60 Capsule, Rfl: 3    Vitamin D3 25 mcg (1,000 unit) tablet, TAKE 2 TABLETS BY MOUTH EVERY DAY, Disp: 60 Tablet, Rfl: 0 memantine (NAMENDA) 10 mg tablet, Take 1 Tablet by mouth daily. , Disp: 30 Tablet, Rfl: 5    traZODone (DESYREL) 50 mg tablet, Take 0.5 Tablets by mouth nightly as needed for Sleep., Disp: 30 Tablet, Rfl: 5    cyclobenzaprine (FLEXERIL) 10 mg tablet, TAKE 1/2 TABLET BY MOUTH THREE TIMES DAILY AS NEEDED FOR MUSCLE SPASMS, Disp: 20 Tablet, Rfl: 0    ibuprofen (MOTRIN) 600 mg tablet, Take 1 Tab by mouth every six (6) hours as needed for Pain., Disp: 20 Tab, Rfl: 0    zinc sulfate (ZINCATE) 220 (50) mg capsule, , Disp: , Rfl: 99    QUEtiapine SR (SEROquel XR) 300 mg sr tablet, Take 300 mg by mouth nightly., Disp: , Rfl:     haloperidol (HALDOL) 5 mg tablet, Take 5 mg by mouth every morning., Disp: , Rfl:     melatonin 3 mg tablet, Take  by mouth., Disp: , Rfl:     LORazepam (ATIVAN) 1 mg tablet, Take 1 Tab by mouth daily as needed (procedural anxiolysis). , Disp: 3 Tab, Rfl: 0    haloperidol (HALDOL) 10 mg tablet, Take 10 mg by mouth nightly., Disp: , Rfl:     naproxen sodium (NAPROSYN) 220 mg tablet, Take 220 mg by mouth two (2) times daily as needed. , Disp: , Rfl:     PARoxetine (PAXIL) 20 mg tablet, Take 10 mg by mouth daily. Indications: should be 10 mg, Disp: , Rfl:     ibuprofen (MOTRIN) 400 mg tablet, Take 1 Tab by mouth every eight (8) hours as needed for Pain., Disp: 30 Tab, Rfl: 1    permethrin (ACTICIN) 5 % topical cream, Apply  to affected area now. apply sparingly as directed, Disp: , Rfl:     divalproex ER (DEPAKOTE ER) 500 mg ER tablet, Take 500 mg by mouth two (2) times a day., Disp: , Rfl:     OLANZapine (ZYPREXA) 5 mg tablet, Take  by mouth nightly. (Patient not taking: Reported on 10/20/2022), Disp: , Rfl:     triamcinolone acetonide (KENALOG) 0.1 % topical cream, Apply  to affected area two (2) times a day. use thin layer as directed x 3-5 days prn (Patient not taking: No sig reported), Disp: 30 g, Rfl: 1    clotrimazole (LOTRIMIN) 1 % topical cream, Apply  to affected area two (2) times a day.  X 7-10 days (Patient not taking: No sig reported), Disp: 45 g, Rfl: 1    sennosides (EX-LAX) 15 mg tablet, Take  by mouth.  (Patient not taking: No sig reported), Disp: , Rfl:       Past Surgical History:     Past Surgical History:   Procedure Laterality Date    COLONOSCOPY Left 10/1/2019    COLONOSCOPY performed by Fabio Valle MD at [facility] ENDOSCOPY    HX UROLOGICAL      tumor removal as child kidney         Family History:     Family History   Family history unknown: Yes         Social History:     Social History     Socioeconomic History    Marital status: SINGLE     Spouse name: Not on file    Number of children: Not on file    Years of education: Not on file    Highest education level: Not on file   Occupational History    Not on file   Tobacco Use    Smoking status: Every Day     Packs/day: 0.25     Years: 30.00     Pack years: 7.50     Types: Cigarettes    Smokeless tobacco: Never   Vaping Use    Vaping Use: Never used   Substance and Sexual Activity    Alcohol use: No    Drug use: No    Sexual activity: Not Currently     Partners: Male   Other Topics Concern    Not on file   Social History Narrative    ** Merged History Encounter **          Social Determinants of Health     Financial Resource Strain: Not on file   Food Insecurity: Not on file   Transportation Needs: Not on file   Physical Activity: Not on file   Stress: Not on file   Social Connections: Not on file   Intimate Partner Violence: Not on file   Housing Stability: Not on file            Visit Vitals  /72 (BP 1 Location: Left upper arm, BP Patient Position: Sitting, BP Cuff Size: Adult)   Pulse 83   Temp 97.3 °F (36.3 °C) (Temporal)   Resp 16   Ht 5' 2\" (1.575 m)   Wt 136 lb 9.6 oz (62 kg)   SpO2 98%   BMI 24.98 kg/m²       Physical Exam:   General - alert and interactive, nad  HEENT - PERRL, TM no erythema/opacification, normal nasal turbinates, no oropharyngeal erythema or exudate, MMM  Neck - supple, no thyroidomegaly, no lymphadenopathy  Pulm - clear to auscultation bilaterally  Cardio - RRR, normal S1 S2, no murmur  Abd - soft, nontender, no masses, no HSM  Extrem - mild non-pitting edema of LE  Neuro-  Alert and oriented, No focal deficits           Labs/Imaging:     Labs and imaging reviewed by me and significant for:    Lab Results   Component Value Date/Time    WBC 10.7 06/02/2022 11:24 AM    HGB 12.2 06/02/2022 11:24 AM    HCT 38.5 06/02/2022 11:24 AM    PLATELET 932 92/10/1479 11:24 AM    .9 (H) 06/02/2022 11:24 AM     Lab Results   Component Value Date/Time    Sodium 134 (L) 06/02/2022 11:24 AM    Potassium 4.6 06/02/2022 11:24 AM    Chloride 101 06/02/2022 11:24 AM    CO2 28 06/02/2022 11:24 AM    Anion gap 5 06/02/2022 11:24 AM    Glucose 91 06/02/2022 11:24 AM    BUN 16 06/02/2022 11:24 AM    Creatinine 0.70 06/02/2022 11:24 AM    BUN/Creatinine ratio 23 (H) 06/02/2022 11:24 AM    GFR est AA >60 06/02/2022 11:24 AM    GFR est non-AA >60 06/02/2022 11:24 AM    Calcium 9.4 06/02/2022 11:24 AM    Bilirubin, total 0.5 06/02/2022 11:24 AM    Alk.  phosphatase 102 06/02/2022 11:24 AM    Protein, total 6.2 (L) 06/02/2022 11:24 AM    Albumin 3.3 (L) 06/02/2022 11:24 AM    Globulin 2.9 06/02/2022 11:24 AM    A-G Ratio 1.1 06/02/2022 11:24 AM    ALT (SGPT) 16 06/02/2022 11:24 AM    AST (SGOT) 37 06/02/2022 11:24 AM     Lab Results   Component Value Date/Time    Hemoglobin A1c 5.5 04/18/2022 09:42 AM    Hemoglobin A1c (POC) 5.6 06/13/2017 12:14 PM     Lab Results   Component Value Date/Time    TSH 1.57 06/02/2022 11:24 AM     Lab Results   Component Value Date/Time    Cholesterol, total 145 04/18/2022 09:42 AM    HDL Cholesterol 43 04/18/2022 09:42 AM    LDL, calculated 80 04/18/2022 09:42 AM    LDL, calculated 73 11/19/2019 10:51 AM    VLDL, calculated 22 04/18/2022 09:42 AM    VLDL, calculated 26 11/19/2019 10:51 AM    Triglyceride 122 04/18/2022 09:42 AM

## 2022-10-20 NOTE — PROGRESS NOTES
1. \"Have you been to the ER, urgent care clinic since your last visit? Hospitalized since your last visit? \" No    2. \"Have you seen or consulted any other health care providers outside of the [address] [place] [address] since your last visit? \" No     3. For patients aged 39-70: Has the patient had a colonoscopy / FIT/ Cologuard? No      If the patient is female:    4. For patients aged 41-77: Has the patient had a mammogram within the past 2 years? Yes - Care Gap present. Most recent result on file      5. For patients aged 21-65: Has the patient had a pap smear? Yes - Care Gap present.  Most recent result on file

## 2022-10-24 DIAGNOSIS — K21.9 GASTROESOPHAGEAL REFLUX DISEASE WITHOUT ESOPHAGITIS: ICD-10-CM

## 2022-10-24 DIAGNOSIS — E03.4 HYPOTHYROIDISM DUE TO ACQUIRED ATROPHY OF THYROID: ICD-10-CM

## 2022-10-25 RX ORDER — LEVOTHYROXINE SODIUM 50 UG/1
TABLET ORAL
Qty: 31 TABLET | Refills: 0 | Status: SHIPPED | OUTPATIENT
Start: 2022-10-25

## 2022-10-25 RX ORDER — PANTOPRAZOLE SODIUM 40 MG/1
TABLET, DELAYED RELEASE ORAL
Qty: 31 TABLET | Refills: 0 | Status: SHIPPED | OUTPATIENT
Start: 2022-10-25

## 2022-11-03 ENCOUNTER — OFFICE VISIT (OUTPATIENT)
Dept: INTERNAL MEDICINE CLINIC | Age: 62
End: 2022-11-03
Payer: MEDICARE

## 2022-11-03 VITALS
BODY MASS INDEX: 23.74 KG/M2 | TEMPERATURE: 98 F | WEIGHT: 134 LBS | HEART RATE: 93 BPM | HEIGHT: 63 IN | OXYGEN SATURATION: 95 % | SYSTOLIC BLOOD PRESSURE: 94 MMHG | DIASTOLIC BLOOD PRESSURE: 64 MMHG | RESPIRATION RATE: 16 BRPM

## 2022-11-03 DIAGNOSIS — Z00.00 ROUTINE PHYSICAL EXAMINATION: Primary | ICD-10-CM

## 2022-11-03 DIAGNOSIS — Z72.0 TOBACCO USE: ICD-10-CM

## 2022-11-03 DIAGNOSIS — Z23 NEEDS FLU SHOT: ICD-10-CM

## 2022-11-03 PROCEDURE — G0008 ADMIN INFLUENZA VIRUS VAC: HCPCS | Performed by: STUDENT IN AN ORGANIZED HEALTH CARE EDUCATION/TRAINING PROGRAM

## 2022-11-03 PROCEDURE — 99214 OFFICE O/P EST MOD 30 MIN: CPT | Performed by: STUDENT IN AN ORGANIZED HEALTH CARE EDUCATION/TRAINING PROGRAM

## 2022-11-03 PROCEDURE — 90471 IMMUNIZATION ADMIN: CPT | Performed by: STUDENT IN AN ORGANIZED HEALTH CARE EDUCATION/TRAINING PROGRAM

## 2022-11-03 PROCEDURE — 90686 IIV4 VACC NO PRSV 0.5 ML IM: CPT | Performed by: STUDENT IN AN ORGANIZED HEALTH CARE EDUCATION/TRAINING PROGRAM

## 2022-11-03 PROCEDURE — 90750 HZV VACC RECOMBINANT IM: CPT | Performed by: STUDENT IN AN ORGANIZED HEALTH CARE EDUCATION/TRAINING PROGRAM

## 2022-11-03 NOTE — PROGRESS NOTES
1. \"Have you been to the ER, urgent care clinic since your last visit? Hospitalized since your last visit? \" No    2. \"Have you seen or consulted any other health care providers outside of the [address] [place] [address] since your last visit? \" No     3. For patients aged 39-70: Has the patient had a colonoscopy / FIT/ Cologuard? Yes - no Care Gap present      If the patient is female:    4. For patients aged 41-77: Has the patient had a mammogram within the past 2 years? Yes - Care Gap present. Most recent result on file      5. For patients aged 21-65: Has the patient had a pap smear? Yes - Care Gap present.  Most recent result on file

## 2022-11-03 NOTE — PROGRESS NOTES
Good Help to Those in [facility]   Internal Medicine  [address] [facility] [address] [facility]  [address]  [place], 200 [facility]  [phone]      Primary Care Visit Note    Assessment/Plan:     Schizophrenia:  - following every 3 mo with psychology  - currently listed as taking depakote, haldol, olanzapine, and paxil     Dementia:   - continue memantine   - she has appt for neurology for establishing care     Macrocytosis:   - Q21 and folic acid  - recheck labs at future visit, consider checking b12, folate     Hypovitaminosis D  - continue cholecalciferol     Hypercholesterolemia:  - last checked in 4/2022  - continue pravastatin 80 mg   - fenofibrate discontinued, called pharmacy    Tobacco use:  - smoking about 3 cigarettes per day  - patient not ready to quit  - given options for quitting/counseled     Health maintenance:  - flu vaccine: 11/3/22  - covid booster: advised to obtain the latest booster. - colonoscopy: due 10/2022, they are planning to schedule  - shingrix: dose 1 on 11/3/22, will plan for second dose at follow up      Neo Lau MD    CC:     Chief Complaint   Patient presents with    Complete Physical       HPI:     Wily Villafana is a 63 y.o. female who presents for routine physical exam.    Patient presents with caregiver, Eben Hurst, who provides most of the history. - they report no new issues but need to get a routine physical exam done and to discuss health maintenance. Tobacco use:  - pt unwilling to give up smoking. Most recent med list discussed, she reports that she cannot stop the fenofibrate unless we call the pharmacy and tell them it has to be discontinued. Dementia:  - they were able to make an appointment with neurology. ROS:   All 10 point review of systems negative except those mentioned in the HPI. Past Medical History:      Active Ambulatory Problems     Diagnosis Date Noted    Schizophrenia, schizo-affective ([address] Hypothyroidism     GERD (gastroesophageal reflux disease)     Kidney tumor (benign)     Allergic rhinitis     Hypercholesterolemia     IGT (impaired glucose tolerance)     Vitamin D deficiency 05/25/2016    Eczema 03/17/2017    Contusion of abdominal wall 04/25/2017    Contusion of right breast 62/57/7587    Acute folliculitis 67/52/2753    Urinary incontinence 10/31/2018    Dysphagia 10/31/2018    COVID-19 01/21/2021    Macrocytosis 10/20/2022    Dementia, unspecified dementia severity, unspecified dementia type, unspecified whether behavioral, psychotic, or mood disturbance or anxiety ([facility] [address] 10/20/2022     Resolved Ambulatory Problems     Diagnosis Date Noted    No Resolved Ambulatory Problems     Past Medical History:   Diagnosis Date    Brain atrophy ([facility] [address] 02/2018    Colon polyps     H/O colonoscopy     History of Papanicolaou smear of cervix 2014    Other ill-defined conditions(799.89)     Psychiatric disorder     Thyroid disease           Current Medications:     Current Outpatient Medications:     levothyroxine (SYNTHROID) 50 mcg tablet, TAKE 1 TAB BY MOUTH DAILY BEFORE BREAKFAST, Disp: 31 Tablet, Rfl: 0    pantoprazole (PROTONIX) 40 mg tablet, TAKE 1 TABLET BY MOUTH ONCE A DAY, Disp: 31 Tablet, Rfl: 0    vit B Cmplx 3-FA-Vit C-Biotin (NEPHRO NATHALY RX) 1- mg-mg-mcg tablet, Take 1 Tablet by mouth daily. , Disp: 90 Tablet, Rfl: 3    pravastatin (PRAVACHOL) 80 mg tablet, TAKE 1 TABLET BY MOUTH EACH NIGHT AT BEDTIME, Disp: 90 Tablet, Rfl: 0    Myrbetriq 50 mg ER tablet, TAKE 1 TABLET BY MOUTH ONCE A DAY, Disp: 90 Tablet, Rfl: 0    cholecalciferol (VITAMIN D3) 25 mcg (1,000 unit) cap, TAKE 2 TABLETS BY MOUTH ONCE DAILY FOR NUTRITIONAL SUPPLEMENT, Disp: 60 Capsule, Rfl: 3    Vitamin D3 25 mcg (1,000 unit) tablet, TAKE 2 TABLETS BY MOUTH EVERY DAY, Disp: 60 Tablet, Rfl: 0    memantine (NAMENDA) 10 mg tablet, Take 1 Tablet by mouth daily. , Disp: 30 Tablet, Rfl: 5    cyclobenzaprine (FLEXERIL) 10 mg tablet, TAKE 1/2 TABLET BY MOUTH THREE TIMES DAILY AS NEEDED FOR MUSCLE SPASMS, Disp: 20 Tablet, Rfl: 0    zinc sulfate (ZINCATE) 220 (50) mg capsule, , Disp: , Rfl: 99    QUEtiapine SR (SEROquel XR) 300 mg sr tablet, Take 300 mg by mouth nightly., Disp: , Rfl:     haloperidol (HALDOL) 5 mg tablet, Take 5 mg by mouth every morning., Disp: , Rfl:     melatonin 3 mg tablet, Take  by mouth., Disp: , Rfl:     OLANZapine (ZYPREXA) 5 mg tablet, Take  by mouth nightly., Disp: , Rfl:     haloperidol (HALDOL) 10 mg tablet, Take 10 mg by mouth nightly., Disp: , Rfl:     naproxen sodium (NAPROSYN) 220 mg tablet, Take 220 mg by mouth two (2) times daily as needed. , Disp: , Rfl:     PARoxetine (PAXIL) 20 mg tablet, Take 10 mg by mouth daily. Indications: should be 10 mg, Disp: , Rfl:     divalproex ER (DEPAKOTE ER) 500 mg ER tablet, Take 500 mg by mouth two (2) times a day., Disp: , Rfl:     ibuprofen (MOTRIN) 600 mg tablet, Take 1 Tab by mouth every six (6) hours as needed for Pain. (Patient not taking: Reported on 11/3/2022), Disp: 20 Tab, Rfl: 0    ibuprofen (MOTRIN) 400 mg tablet, Take 1 Tab by mouth every eight (8) hours as needed for Pain.  (Patient not taking: Reported on 11/3/2022), Disp: 30 Tab, Rfl: 1      Past Surgical History:     Past Surgical History:   Procedure Laterality Date    COLONOSCOPY Left 10/1/2019    COLONOSCOPY performed by Rodrigo Schwarz MD at [facility] ENDOSCOPY    HX UROLOGICAL      tumor removal as child kidney         Family History:     Family History   Family history unknown: Yes         Social History:     Social History     Socioeconomic History    Marital status: SINGLE     Spouse name: Not on file    Number of children: Not on file    Years of education: Not on file    Highest education level: Not on file   Occupational History    Not on file   Tobacco Use    Smoking status: Every Day     Packs/day: 0.25     Years: 30.00     Pack years: 7.50     Types: Cigarettes    Smokeless tobacco: Never   Vaping Use Vaping Use: Never used   Substance and Sexual Activity    Alcohol use: No    Drug use: No    Sexual activity: Not Currently     Partners: Male   Other Topics Concern    Not on file   Social History Narrative    ** Merged History Encounter **          Social Determinants of Health     Financial Resource Strain: Not on file   Food Insecurity: Not on file   Transportation Needs: Not on file   Physical Activity: Not on file   Stress: Not on file   Social Connections: Not on file   Intimate Partner Violence: Not on file   Housing Stability: Not on file            Visit Vitals  BP 94/64   Pulse 93   Temp 98 °F (36.7 °C) (Temporal)   Resp 16   Ht 5' 2.5\" (1.588 m)   Wt 134 lb (60.8 kg)   SpO2 95%   BMI 24.12 kg/m²       Physical Exam:   General - Well appearing female  HEENT - PERRL, TM no erythema/opacification, normal nasal turbinates, no oropharyngeal erythema or exudate, MMM, dentures  Neck - supple, no thyroidomegaly, no lymphadenopathy  Pulm - clear to auscultation bilaterally  Cardio - RRR, normal S1 S2, no murmur  Abd - soft, nontender, no masses, no HSM  Extrem - no edema, +2 distal pulses  Neuro-  Alert and oriented to name birthday, Thursday, month, not year. Patient interactive and engaged. Minicog: patient unable to repeat back initial 3 words correctly. Boy, [place], [address], repeated as peanut, orange, orange. No focal weakness or loss of sensation.            Labs/Imaging:     Labs and imaging reviewed by me and significant for:    Lab Results   Component Value Date/Time    WBC 10.7 06/02/2022 11:24 AM    HGB 12.2 06/02/2022 11:24 AM    HCT 38.5 06/02/2022 11:24 AM    PLATELET 078 79/23/9657 11:24 AM    .9 (H) 06/02/2022 11:24 AM     Lab Results   Component Value Date/Time    Sodium 134 (L) 06/02/2022 11:24 AM    Potassium 4.6 06/02/2022 11:24 AM    Chloride 101 06/02/2022 11:24 AM    CO2 28 06/02/2022 11:24 AM    Anion gap 5 06/02/2022 11:24 AM    Glucose 91 06/02/2022 11:24 AM    BUN 16 06/02/2022 11:24 AM    Creatinine 0.70 06/02/2022 11:24 AM    BUN/Creatinine ratio 23 (H) 06/02/2022 11:24 AM    GFR est AA >60 06/02/2022 11:24 AM    GFR est non-AA >60 06/02/2022 11:24 AM    Calcium 9.4 06/02/2022 11:24 AM    Bilirubin, total 0.5 06/02/2022 11:24 AM    Alk.  phosphatase 102 06/02/2022 11:24 AM    Protein, total 6.2 (L) 06/02/2022 11:24 AM    Albumin 3.3 (L) 06/02/2022 11:24 AM    Globulin 2.9 06/02/2022 11:24 AM    A-G Ratio 1.1 06/02/2022 11:24 AM    ALT (SGPT) 16 06/02/2022 11:24 AM    AST (SGOT) 37 06/02/2022 11:24 AM     Lab Results   Component Value Date/Time    TSH 1.57 06/02/2022 11:24 AM     Lab Results   Component Value Date/Time    Hemoglobin A1c 5.5 04/18/2022 09:42 AM    Hemoglobin A1c (POC) 5.6 06/13/2017 12:14 PM       Lab Results   Component Value Date/Time    Cholesterol, total 145 04/18/2022 09:42 AM    HDL Cholesterol 43 04/18/2022 09:42 AM    LDL, calculated 80 04/18/2022 09:42 AM    LDL, calculated 73 11/19/2019 10:51 AM    VLDL, calculated 22 04/18/2022 09:42 AM    VLDL, calculated 26 11/19/2019 10:51 AM    Triglyceride 122 04/18/2022 09:42 AM

## 2022-11-04 ENCOUNTER — OFFICE VISIT (OUTPATIENT)
Dept: NEUROLOGY | Age: 62
End: 2022-11-04
Payer: MEDICARE

## 2022-11-04 VITALS
OXYGEN SATURATION: 98 % | SYSTOLIC BLOOD PRESSURE: 110 MMHG | WEIGHT: 139 LBS | BODY MASS INDEX: 25.02 KG/M2 | DIASTOLIC BLOOD PRESSURE: 72 MMHG | HEART RATE: 102 BPM

## 2022-11-04 DIAGNOSIS — F03.90 DEMENTIA, UNSPECIFIED DEMENTIA SEVERITY, UNSPECIFIED DEMENTIA TYPE, UNSPECIFIED WHETHER BEHAVIORAL, PSYCHOTIC, OR MOOD DISTURBANCE OR ANXIETY (HCC): Primary | ICD-10-CM

## 2022-11-04 PROCEDURE — G9899 SCRN MAM PERF RSLTS DOC: HCPCS | Performed by: NURSE PRACTITIONER

## 2022-11-04 PROCEDURE — G8432 DEP SCR NOT DOC, RNG: HCPCS | Performed by: NURSE PRACTITIONER

## 2022-11-04 PROCEDURE — 3017F COLORECTAL CA SCREEN DOC REV: CPT | Performed by: NURSE PRACTITIONER

## 2022-11-04 PROCEDURE — G8428 CUR MEDS NOT DOCUMENT: HCPCS | Performed by: NURSE PRACTITIONER

## 2022-11-04 PROCEDURE — G8419 CALC BMI OUT NRM PARAM NOF/U: HCPCS | Performed by: NURSE PRACTITIONER

## 2022-11-04 PROCEDURE — 99215 OFFICE O/P EST HI 40 MIN: CPT | Performed by: NURSE PRACTITIONER

## 2022-11-04 RX ORDER — MEMANTINE HYDROCHLORIDE 10 MG/1
10 TABLET ORAL 2 TIMES DAILY
Qty: 60 TABLET | Refills: 5 | Status: SHIPPED | OUTPATIENT
Start: 2022-11-04

## 2022-11-04 NOTE — PROGRESS NOTES
Pt is in group home.  Did no now she had dementia   Moved into group home about 2-3 yeas  Seen progression, last 6 months symptoms have gotten   Confusion, word salads, delusions  Goes to adult , does have social interaction   Pt really likes to go out and about  Mood seems to be stable

## 2022-11-08 NOTE — PROGRESS NOTES
Abiola Mcdonnell is a 63 y.o. female who presents with the following  Chief Complaint   Patient presents with    Follow-up       HPI    FU for dementia   Did see DR. Devine and not seen her in about 2 years  She is living in a group home   She is being well cared for   She gets her medications as she should   Namenda 10 mg BID   She has some restlessness but is overall well cooperative. She does get along with most other residents. She is sleeping well   Eating well  She does go to day support  She is noticing that the memory is getting worse though   Over the past few months a lot more repeating, memory loss, confusion noticed. Allergies   Allergen Reactions    Bee Sting [Sting, Bee] Unknown (comments)     Patient states this is not an allergy      Bees [Hymenoptera Allergenic Extract] Other (comments)    Ragweed Unknown (comments)    Risperidone Other (comments)       Current Outpatient Medications   Medication Sig    memantine (NAMENDA) 10 mg tablet Take 1 Tablet by mouth two (2) times a day. levothyroxine (SYNTHROID) 50 mcg tablet TAKE 1 TAB BY MOUTH DAILY BEFORE BREAKFAST    pantoprazole (PROTONIX) 40 mg tablet TAKE 1 TABLET BY MOUTH ONCE A DAY    vit B Cmplx 3-FA-Vit C-Biotin (NEPHRO NATHALY RX) 1- mg-mg-mcg tablet Take 1 Tablet by mouth daily. pravastatin (PRAVACHOL) 80 mg tablet TAKE 1 TABLET BY MOUTH EACH NIGHT AT BEDTIME    Myrbetriq 50 mg ER tablet TAKE 1 TABLET BY MOUTH ONCE A DAY    cholecalciferol (VITAMIN D3) 25 mcg (1,000 unit) cap TAKE 2 TABLETS BY MOUTH ONCE DAILY FOR NUTRITIONAL SUPPLEMENT    Vitamin D3 25 mcg (1,000 unit) tablet TAKE 2 TABLETS BY MOUTH EVERY DAY    ibuprofen (MOTRIN) 600 mg tablet Take 1 Tab by mouth every six (6) hours as needed for Pain. zinc sulfate (ZINCATE) 220 (50) mg capsule     QUEtiapine SR (SEROquel XR) 300 mg sr tablet Take 300 mg by mouth nightly.    haloperidol (HALDOL) 5 mg tablet Take 5 mg by mouth every morning.     melatonin 3 mg tablet Take  by mouth.    OLANZapine (ZYPREXA) 5 mg tablet Take  by mouth nightly.    haloperidol (HALDOL) 10 mg tablet Take 10 mg by mouth nightly. naproxen sodium (NAPROSYN) 220 mg tablet Take 220 mg by mouth two (2) times daily as needed. PARoxetine (PAXIL) 20 mg tablet Take 10 mg by mouth daily. Indications: should be 10 mg    ibuprofen (MOTRIN) 400 mg tablet Take 1 Tab by mouth every eight (8) hours as needed for Pain.    divalproex ER (DEPAKOTE ER) 500 mg ER tablet Take 500 mg by mouth two (2) times a day. No current facility-administered medications for this visit.        Social History     Tobacco Use   Smoking Status Every Day    Packs/day: 0.25    Years: 30.00    Pack years: 7.50    Types: Cigarettes   Smokeless Tobacco Never       Past Medical History:   Diagnosis Date    Allergic rhinitis     Brain atrophy ([facility] 02/2018    Colon polyps     COVID-19 01/21/2021    GERD (gastroesophageal reflux disease)     H/O colonoscopy     History of Papanicolaou smear of cervix 2014    Hypercholesterolemia     hypercholesterolemia    Hypothyroidism     IGT (impaired glucose tolerance)     Kidney tumor (benign)     removed in childhood     Other ill-defined conditions(799.89)     right shoulder bursitis    Psychiatric disorder     schizophrenia    Schizophrenia, schizo-affective ([facility] [address]     Thyroid disease        Past Surgical History:   Procedure Laterality Date    COLONOSCOPY Left 10/1/2019    COLONOSCOPY performed by Josh Bonds MD at [facility] ENDOSCOPY    HX UROLOGICAL      tumor removal as child kidney       Family History   Family history unknown: Yes       Social History     Socioeconomic History    Marital status: SINGLE   Tobacco Use    Smoking status: Every Day     Packs/day: 0.25     Years: 30.00     Pack years: 7.50     Types: Cigarettes    Smokeless tobacco: Never   Vaping Use    Vaping Use: Never used   Substance and Sexual Activity    Alcohol use: No    Drug use: No    Sexual activity: Not Currently     Partners: Male   Social History Narrative    ** Merged History Encounter **            Review of Systems   Eyes:  Negative for blurred vision, double vision and photophobia. Respiratory:  Negative for shortness of breath and wheezing. Cardiovascular:  Negative for chest pain and palpitations. Musculoskeletal:  Negative for falls. Neurological:  Negative for seizures and loss of consciousness. Psychiatric/Behavioral:  Positive for memory loss. Negative for depression and suicidal ideas. The patient is nervous/anxious. Remainder of comprehensive review is negative. Physical Exam :    Visit Vitals  /72 (BP 1 Location: Left upper arm, BP Patient Position: Sitting, BP Cuff Size: Adult)   Pulse (!) 102   Wt 63 kg (139 lb)   SpO2 98%   BMI 25.02 kg/m²       General: Well defined, nourished, and groomed individual in no acute distress. Musculoskeletal: Extremities revealed no edema and had full range of motion of joints. Psych: Good mood and bright affect    NEUROLOGICAL EXAMINATION:    Mental Status: Alert and oriented to person    Cranial Nerves:    II, III, IV, VI: Visual acuity grossly intact. Visual fields are normal.    Pupils are equal, round, and reactive to light and accommodation. Extra-ocular movements are full and fluid. Fundoscopic exam was benign, no ptosis or nystagmus. V-XII: Hearing is grossly intact. Facial features are symmetric, with normal sensation and strength. The palate rises symmetrically and the tongue protrudes midline. Sternocleidomastoids 5/5. Motor Examination: Normal tone, bulk, and strength, 5/5 muscle strength throughout. Coordination: Finger to nose was normal. No resting or intention tremor    Gait and Station: Steady while walking. Normal arm swing. No pronator drift. No muscle wasting or fasiculations noted. Reflexes: DTRs 2+ throughout.           Results for orders placed or performed in visit on 10/19/22    COLONOSCOPY   Result Value Ref Range Colonoscopy, External         Orders Placed This Encounter    CT HEAD WO CONT     Standing Status:   Future     Standing Expiration Date:   12/4/2023    DUPLEX CAROTID BILATERAL     Standing Status:   Future     Standing Expiration Date:   5/4/2023    memantine (NAMENDA) 10 mg tablet     Sig: Take 1 Tablet by mouth two (2) times a day. Dispense:  60 Tablet     Refill:  5       1. Dementia, unspecified dementia severity, unspecified dementia type, unspecified whether behavioral, psychotic, or mood disturbance or anxiety ([facility] [address]      CT head to evaluate cause of changes, ischemia, tumor, stroke. Doppler for stenosis. Keep Namenda 10 mg BID For memory preservation   Continue to keep her active and engaged.                This note will not be viewable in [facility]

## 2022-11-18 ENCOUNTER — DOCUMENTATION ONLY (OUTPATIENT)
Dept: NEUROLOGY | Age: 62
End: 2022-11-18

## 2022-11-18 NOTE — PROGRESS NOTES
[facility] AT [place]   Carotid Doppler Report      Patient: Radha Kauffman  1960  Date of Service: 11/18/2022  Referring:  No ref. provider found    B-mode imaging reveals mild homogenous plaque at the bifurcations extending into the proximal internal carotid artery segments. Doppler spectral analysis reveals no elevated velocities. Vertebral artery flow is antegrade bilaterally.     Interpretation  1-15% bilateral ICA      Regino Mcintosh MD

## 2022-11-21 DIAGNOSIS — E78.00 HYPERCHOLESTEROLEMIA: ICD-10-CM

## 2022-11-22 ENCOUNTER — TELEPHONE (OUTPATIENT)
Dept: NEUROLOGY | Age: 62
End: 2022-11-22

## 2022-11-22 RX ORDER — MIRABEGRON 50 MG/1
TABLET, FILM COATED, EXTENDED RELEASE ORAL
Qty: 30 TABLET | Refills: 0 | OUTPATIENT
Start: 2022-11-22

## 2022-11-22 RX ORDER — PRAVASTATIN SODIUM 80 MG/1
TABLET ORAL
Qty: 30 TABLET | Refills: 0 | OUTPATIENT
Start: 2022-11-22

## 2022-12-13 DIAGNOSIS — E03.4 HYPOTHYROIDISM DUE TO ACQUIRED ATROPHY OF THYROID: ICD-10-CM

## 2022-12-13 DIAGNOSIS — K21.9 GASTROESOPHAGEAL REFLUX DISEASE WITHOUT ESOPHAGITIS: ICD-10-CM

## 2022-12-13 RX ORDER — PANTOPRAZOLE SODIUM 40 MG/1
TABLET, DELAYED RELEASE ORAL
Qty: 31 TABLET | Refills: 0 | Status: SHIPPED | OUTPATIENT
Start: 2022-12-13

## 2022-12-13 RX ORDER — LEVOTHYROXINE SODIUM 50 UG/1
TABLET ORAL
Qty: 31 TABLET | Refills: 0 | Status: SHIPPED | OUTPATIENT
Start: 2022-12-13

## 2023-01-16 ENCOUNTER — HOSPITAL ENCOUNTER (OUTPATIENT)
Age: 63
Setting detail: OUTPATIENT SURGERY
Discharge: HOME | End: 2023-01-16
Attending: INTERNAL MEDICINE | Admitting: INTERNAL MEDICINE
Payer: MEDICARE

## 2023-01-16 ENCOUNTER — ANESTHESIA EVENT (OUTPATIENT)
Dept: ENDOSCOPY | Age: 63
End: 2023-01-16
Payer: MEDICARE

## 2023-01-16 ENCOUNTER — ANESTHESIA (OUTPATIENT)
Dept: ENDOSCOPY | Age: 63
End: 2023-01-16
Payer: MEDICARE

## 2023-01-16 VITALS
WEIGHT: 138.9 LBS | DIASTOLIC BLOOD PRESSURE: 93 MMHG | HEIGHT: 64 IN | BODY MASS INDEX: 23.71 KG/M2 | RESPIRATION RATE: 16 BRPM | OXYGEN SATURATION: 96 % | HEART RATE: 81 BPM | TEMPERATURE: 98 F | SYSTOLIC BLOOD PRESSURE: 134 MMHG

## 2023-01-16 PROCEDURE — 74011250636 HC RX REV CODE- 250/636: Performed by: NURSE ANESTHETIST, CERTIFIED REGISTERED

## 2023-01-16 PROCEDURE — 2709999900 HC NON-CHARGEABLE SUPPLY: Performed by: INTERNAL MEDICINE

## 2023-01-16 PROCEDURE — 88305 TISSUE EXAM BY PATHOLOGIST: CPT

## 2023-01-16 PROCEDURE — 76060000032 HC ANESTHESIA 0.5 TO 1 HR: Performed by: INTERNAL MEDICINE

## 2023-01-16 PROCEDURE — 77030013992 HC SNR POLYP ENDOSC BSC -B: Performed by: INTERNAL MEDICINE

## 2023-01-16 PROCEDURE — 74011250636 HC RX REV CODE- 250/636: Performed by: INTERNAL MEDICINE

## 2023-01-16 PROCEDURE — 76040000007: Performed by: INTERNAL MEDICINE

## 2023-01-16 PROCEDURE — 74011000250 HC RX REV CODE- 250: Performed by: NURSE ANESTHETIST, CERTIFIED REGISTERED

## 2023-01-16 RX ORDER — SODIUM CHLORIDE 9 MG/ML
100 INJECTION, SOLUTION INTRAVENOUS CONTINUOUS
Status: DISCONTINUED | OUTPATIENT
Start: 2023-01-16 | End: 2023-01-16 | Stop reason: HOSPADM

## 2023-01-16 RX ORDER — ATROPINE SULFATE 0.1 MG/ML
0.5 INJECTION INTRAVENOUS
Status: DISCONTINUED | OUTPATIENT
Start: 2023-01-16 | End: 2023-01-16 | Stop reason: HOSPADM

## 2023-01-16 RX ORDER — LIDOCAINE HYDROCHLORIDE 20 MG/ML
INJECTION, SOLUTION EPIDURAL; INFILTRATION; INTRACAUDAL; PERINEURAL AS NEEDED
Status: DISCONTINUED | OUTPATIENT
Start: 2023-01-16 | End: 2023-01-16 | Stop reason: HOSPADM

## 2023-01-16 RX ORDER — SODIUM CHLORIDE 0.9 % (FLUSH) 0.9 %
5-40 SYRINGE (ML) INJECTION AS NEEDED
Status: DISCONTINUED | OUTPATIENT
Start: 2023-01-16 | End: 2023-01-16 | Stop reason: HOSPADM

## 2023-01-16 RX ORDER — NALOXONE HYDROCHLORIDE 0.4 MG/ML
0.4 INJECTION, SOLUTION INTRAMUSCULAR; INTRAVENOUS; SUBCUTANEOUS
Status: DISCONTINUED | OUTPATIENT
Start: 2023-01-16 | End: 2023-01-16 | Stop reason: HOSPADM

## 2023-01-16 RX ORDER — LIDOCAINE HYDROCHLORIDE 20 MG/ML
5 SOLUTION OROPHARYNGEAL AS NEEDED
Status: DISCONTINUED | OUTPATIENT
Start: 2023-01-16 | End: 2023-01-16 | Stop reason: HOSPADM

## 2023-01-16 RX ORDER — FLUMAZENIL 0.1 MG/ML
0.2 INJECTION INTRAVENOUS
Status: DISCONTINUED | OUTPATIENT
Start: 2023-01-16 | End: 2023-01-16 | Stop reason: HOSPADM

## 2023-01-16 RX ORDER — EPINEPHRINE 0.1 MG/ML
1 INJECTION INTRACARDIAC; INTRAVENOUS
Status: DISCONTINUED | OUTPATIENT
Start: 2023-01-16 | End: 2023-01-16 | Stop reason: HOSPADM

## 2023-01-16 RX ORDER — MIDAZOLAM HYDROCHLORIDE 1 MG/ML
5 INJECTION, SOLUTION INTRAMUSCULAR; INTRAVENOUS
Status: DISCONTINUED | OUTPATIENT
Start: 2023-01-16 | End: 2023-01-16 | Stop reason: HOSPADM

## 2023-01-16 RX ORDER — DEXTROMETHORPHAN/PSEUDOEPHED 2.5-7.5/.8
1.2 DROPS ORAL
Status: DISCONTINUED | OUTPATIENT
Start: 2023-01-16 | End: 2023-01-16 | Stop reason: HOSPADM

## 2023-01-16 RX ORDER — FENTANYL CITRATE 50 UG/ML
100 INJECTION, SOLUTION INTRAMUSCULAR; INTRAVENOUS ONCE
Status: DISCONTINUED | OUTPATIENT
Start: 2023-01-16 | End: 2023-01-16 | Stop reason: HOSPADM

## 2023-01-16 RX ORDER — DEXTROSE MONOHYDRATE AND SODIUM CHLORIDE 5; .9 G/100ML; G/100ML
100 INJECTION, SOLUTION INTRAVENOUS CONTINUOUS
Status: DISCONTINUED | OUTPATIENT
Start: 2023-01-16 | End: 2023-01-16 | Stop reason: HOSPADM

## 2023-01-16 RX ORDER — SODIUM CHLORIDE 0.9 % (FLUSH) 0.9 %
5-40 SYRINGE (ML) INJECTION EVERY 8 HOURS
Status: DISCONTINUED | OUTPATIENT
Start: 2023-01-16 | End: 2023-01-16 | Stop reason: HOSPADM

## 2023-01-16 RX ORDER — PROPOFOL 10 MG/ML
INJECTION, EMULSION INTRAVENOUS AS NEEDED
Status: DISCONTINUED | OUTPATIENT
Start: 2023-01-16 | End: 2023-01-16 | Stop reason: HOSPADM

## 2023-01-16 RX ADMIN — PROPOFOL 10 MG: 10 INJECTION, EMULSION INTRAVENOUS at 09:49

## 2023-01-16 RX ADMIN — PROPOFOL 10 MG: 10 INJECTION, EMULSION INTRAVENOUS at 09:51

## 2023-01-16 RX ADMIN — PROPOFOL 10 MG: 10 INJECTION, EMULSION INTRAVENOUS at 10:02

## 2023-01-16 RX ADMIN — PROPOFOL 10 MG: 10 INJECTION, EMULSION INTRAVENOUS at 10:04

## 2023-01-16 RX ADMIN — PROPOFOL 10 MG: 10 INJECTION, EMULSION INTRAVENOUS at 10:00

## 2023-01-16 RX ADMIN — SODIUM CHLORIDE 100 ML/HR: 9 INJECTION, SOLUTION INTRAVENOUS at 09:36

## 2023-01-16 RX ADMIN — PROPOFOL 10 MG: 10 INJECTION, EMULSION INTRAVENOUS at 09:56

## 2023-01-16 RX ADMIN — PROPOFOL 10 MG: 10 INJECTION, EMULSION INTRAVENOUS at 09:50

## 2023-01-16 RX ADMIN — PROPOFOL 40 MG: 10 INJECTION, EMULSION INTRAVENOUS at 09:45

## 2023-01-16 RX ADMIN — PROPOFOL 10 MG: 10 INJECTION, EMULSION INTRAVENOUS at 09:58

## 2023-01-16 RX ADMIN — PROPOFOL 10 MG: 10 INJECTION, EMULSION INTRAVENOUS at 09:48

## 2023-01-16 RX ADMIN — LIDOCAINE HYDROCHLORIDE 40 MG: 20 INJECTION, SOLUTION EPIDURAL; INFILTRATION; INTRACAUDAL; PERINEURAL at 09:44

## 2023-01-16 RX ADMIN — PROPOFOL 10 MG: 10 INJECTION, EMULSION INTRAVENOUS at 09:47

## 2023-01-16 RX ADMIN — PROPOFOL 10 MG: 10 INJECTION, EMULSION INTRAVENOUS at 09:46

## 2023-01-16 RX ADMIN — PROPOFOL 10 MG: 10 INJECTION, EMULSION INTRAVENOUS at 09:52

## 2023-01-16 RX ADMIN — PROPOFOL 10 MG: 10 INJECTION, EMULSION INTRAVENOUS at 10:06

## 2023-01-16 RX ADMIN — PROPOFOL 10 MG: 10 INJECTION, EMULSION INTRAVENOUS at 09:54

## 2023-01-16 RX ADMIN — PROPOFOL 50 MG: 10 INJECTION, EMULSION INTRAVENOUS at 09:44

## 2023-01-16 NOTE — PROCEDURES
G I Procedure Note            COLONOSCOPY   Dr. Edouard [place] office   [address] [facility]                                   [id number]                                  [id number]   1960                                      63 y.o.                                    female      Procedure Date: 1/16/2023   [x]  Anesthesia MAC                                                                                                Pre Op Diagnosis:    Indications:                   1. Screening for colon cancer [Z12.11]                                                                                                                                                                          Post Op Diagnosis:                    1.   Removed 4mm Colon polyp                                                                            H&p completed: Yes            Anesthesia Assessment: Performed prior to procedure:      No change  Anesthesia Plan: Performed prior to procedure:                   No change       Medications: See Reviewed List and Reconcilation           Informed consent was obtained     Risk Statement:  Prior to the procedure the risks were explained to the patient and/or to the family including but not limited to perforation, bleeding, adverse drug reaction, aspiration, and even the need for possible surgery. A colonoscopy exam is not 100% accurate which may be related to preparation or blind spots during the exam.The possibility that an abnormality and /or cancer could be missed was also discussed as well as alternative x-ray options.          Instrument:    Olympus adult Videocolonoscope                                   Immediate Procedure Reassessment Completed     With the patient in the left lateral position, a rectal examination was performed and the findings were: negative without mass, lesions or tenderness   The Olympus Video colonoscope was inserted under direct vision into the rectum. The colonoscope was passed from the rectum to the cecum, which was identified by the ileocecal valve. The colon findings demonstrated:  ANUS: Anal exam reveals no masses or external hemorrhoids, sphincter tone is normal.   RECTUM: Rectal exam reveals no masses  . SIGMOID COLON: The sigmoid was unremarkable except as noted below   Findings below   DESCENDING COLON:  The videoscolonoscope was advanced carefully. Findings below  SPLENIC FLEXURE: The splenic flexure is normal.   TRANSVERSE COLON:  The typical triangular pattern was noted. Findings below      HEPATIC FLEXURE: The hepatic flexure is normal.   ASCENDING COLON:  No  bleeding Findings below     CECUM:  The ileocecal valve appears normal.   TERMINAL ILEUM: The terminal ileum was not entered. Finding noted      [x] mucosa normal      [] Diverticulosis     [] avm     [] Additional findings:       A polyp was identified. #4,   mm in size, located in the descending colon removed by cold biopsy and sent for pathology The polypectomy site was reviewed and was free of hemorrhage. The colonoscope was slowly withdrawn >6 minute period and the instrument was retroflexed in the rectum. The rectal findings were:Normal  The patient tolerated the entire procedure well. Blood Loss nil  No complications  Anesthesia  MAC  No crystalloids  No Implants  Assistants : per nursing documentation team members     For biopsy  Specimen verification by physician and nurse two sources, name,           social security numbers     Colon preparation was good    Recommendations:     - Repeat colonoscopy in 5 years.       Copies sent to   Constantino Mc MD  CC:  Momo Vasquez MD

## 2023-01-16 NOTE — ROUTINE PROCESS
Ramón Nuno [facility]  1960  [id number]    Situation:  Verbal report received from: ARTUR Enriquez  Procedure: Procedure(s):  COLONOSCOPY  ENDOSCOPIC POLYPECTOMY    Background:    Preoperative diagnosis: Screening for colon cancer [Z12.11]  Postoperative diagnosis: Colon polyp    :  Dr. Sharolyn Paget  Assistant(s): Endoscopy Technician-1: Hunter Zarate  Endoscopy RN-1: Mary Lou Gtz RN    Specimens:   ID Type Source Tests Collected by Time Destination   1 : Descending colon polyp biopsy Preservative Colon, Descending  Ellis Way MD 1/16/2023 1007 Pathology     H. Pylori  no    Assessment:  I  Anesthesia gave intra-procedure sedation and medications, see anesthesia flow sheet yes    Intravenous fluids: NS@ KVO     Vital signs stable     Abdominal assessment: round and soft     Recommendation:  Discharge patient per MD order.   Return to floor  Family or Friend   Permission to share finding with family or friend

## 2023-01-16 NOTE — H&P
G I Procedure Note           Endoscopy History and Physical           Dr. Holger Landa     [facility] - [address] [id number]  [id number]    1960  63 y.o.  female      Date of Procedure:   Preoperative Diagnosis:       Procedure:   1/16/2023      Screening for colon cancer [Z12.11]                         Procedure(s):  COLONOSCOPY      Gastroenterologist:  Anesthesia:           Trice Ramsey MD                               MAC            History and procedure indication:  Palak Nguyễn is a 63 y.o. WHITE/NON- female who presents with: Screening for colon cancer [Z12.11]   including the additional history of Screening ,Screening for colon cancer,,        Past Medical History:   Diagnosis Date    Allergic rhinitis     Brain atrophy ([facility] [address] 02/2018    Colon polyps     COVID-19 01/21/2021    GERD (gastroesophageal reflux disease)     H/O colonoscopy     History of Papanicolaou smear of cervix 2014    Hypercholesterolemia     hypercholesterolemia    Hypothyroidism     IGT (impaired glucose tolerance)     Kidney tumor (benign)     removed in childhood     Other ill-defined conditions(799.89)     right shoulder bursitis    Psychiatric disorder     schizophrenia    Schizophrenia, schizo-affective ([facility] [address]     Thyroid disease       Prior to Admission medications    Medication Sig Start Date End Date Taking? Authorizing Provider   levothyroxine (SYNTHROID) 50 mcg tablet TAKE 1 TAB BY MOUTH DAILY BEFORE BREAKFAST 12/13/22   Tara VELA NP   pantoprazole (PROTONIX) 40 mg tablet TAKE 1 TABLET BY MOUTH ONCE A DAY 12/13/22   Tara VELA NP   memantine (NAMENDA) 10 mg tablet Take 1 Tablet by mouth two (2) times a day. 11/4/22   Tomeka Tenorio NP   vit B Cmplx 3-FA-Vit C-Biotin (NEPHRO NATHALY RX) 1- mg-mg-mcg tablet Take 1 Tablet by mouth daily.  10/20/22   Giulia Silveira MD pravastatin (PRAVACHOL) 80 mg tablet TAKE 1 TABLET BY MOUTH EACH NIGHT AT BEDTIME 9/8/22   Arlester Stagers T, NP   Myrbetriq 50 mg ER tablet TAKE 1 TABLET BY MOUTH ONCE A DAY 9/8/22   Arlester Stagers T, NP   cholecalciferol (VITAMIN D3) 25 mcg (1,000 unit) cap TAKE 2 TABLETS BY MOUTH ONCE DAILY FOR NUTRITIONAL SUPPLEMENT 9/8/22   Rockyter Stagers T, NP   Vitamin D3 25 mcg (1,000 unit) tablet TAKE 2 TABLETS BY MOUTH EVERY DAY 9/8/22   Arjennater Stagers T, NP   ibuprofen (MOTRIN) 600 mg tablet Take 1 Tab by mouth every six (6) hours as needed for Pain. 10/11/20   Holli Roche MD   zinc sulfate (ZINCATE) 220 (50) mg capsule  7/1/19   Provider, Historical   QUEtiapine SR (SEROquel XR) 300 mg sr tablet Take 300 mg by mouth nightly. Provider, Historical   haloperidol (HALDOL) 5 mg tablet Take 5 mg by mouth every morning. 5/1/18   Provider, Historical   melatonin 3 mg tablet Take  by mouth. Provider, Historical   OLANZapine (ZYPREXA) 5 mg tablet Take  by mouth nightly. Provider, Historical   haloperidol (HALDOL) 10 mg tablet Take 10 mg by mouth nightly. 12/1/17   Provider, Historical   naproxen sodium (NAPROSYN) 220 mg tablet Take 220 mg by mouth two (2) times daily as needed. Provider, Historical   PARoxetine (PAXIL) 20 mg tablet Take 10 mg by mouth daily. Indications: should be 10 mg    Provider, Historical   ibuprofen (MOTRIN) 400 mg tablet Take 1 Tab by mouth every eight (8) hours as needed for Pain. 1/29/16   Deidra Klein MD   divalproex ER (DEPAKOTE ER) 500 mg ER tablet Take 500 mg by mouth two (2) times a day.     Provider, Historical     Allergies   Allergen Reactions    Bee Sting [Sting, Bee] Unknown (comments)     Patient states this is not an allergy      Bees [Hymenoptera Allergenic Extract] Other (comments)    Ragweed Unknown (comments)    Risperidone Other (comments)       Past Surgical History:   Procedure Laterality Date    COLONOSCOPY Left 10/1/2019    COLONOSCOPY performed by Chavez Rashid MD at [facility] ENDOSCOPY    HX UROLOGICAL      tumor removal as child kidney     Family History   Family history unknown: Yes      Social History     Tobacco Use    Smoking status: Every Day     Packs/day: 0.25     Years: 30.00     Pack years: 7.50     Types: Cigarettes    Smokeless tobacco: Never   Substance Use Topics    Alcohol use: No                                                      PHYSICAL EXAM   There were no vitals taken for this visit. General appearance:  alert,  in no distress  Mental status:  normal mood, behavior, speech, dress, motor activity and thought processes  Nose:      normal and patent, no erythema, discharge    Mouth:- mucous membranes moist, pharynx normal without lesions                  [x]  No Loose teeth      []    Loose teeth  Finger opening:  []1     []1.5    [] 2     [] 2.5     [x] 3      [] 3.5     [] 4   Mallampati:         [] Class 1     [x] Class 2    [] Class 3      [] Class 4      Neck - supple,      [x] Full ROM [] Decreased ROM  [] Short Neck no significant adenopathy    Chest - clear to auscultation, no wheezes, rales or rhonchi, symmetric air entry  Heart: normal rate, regular rhythm, normal S1, S2, no murmurs,   Abdomen: abdomen soft, bowel sounds  [x] normal  [] increased  [] hypoactive                  [x] no tenderness  [] epigastric tenderness  [] LLQ tenderness   [] RLQ tenderness                      No masses, organomegaly or guarding.   Rectal exam: negative without mass, lesions or tenderness  Extremities:  , no pedal edema, no  cyanosis  Neurologic: Alert and oriented to person, place, and time;                          Normal symmetric reflexes  Normal gait:                                      Assessement:                                 Pre op dx:  Screening for colon cancer [Z12.11]   Additional medical problems list below   Patient Active Problem List   Diagnosis Code    Sch[facility]ia, [facility]-affective ([facility] F25.9    Hypothyroidism E03.9    GERD (gastroesophageal reflux disease) K21.9    Kidney tumor (benign) D30.00    Allergic rhinitis J30.9    Hypercholesterolemia E78.00    IGT (impaired glucose tolerance) R73.02    Vitamin D deficiency E55.9    Eczema L30.9    Contusion of abdominal wall S30. 1XXA    Contusion of right breast P66.19EQ    Acute folliculitis W87.4    Urinary incontinence R32    Dysphagia R13.10    COVID-19 U07.1    Macrocytosis D75.89    Dementia, unspecified dementia severity, unspecified dementia type, unspecified whether behavioral, psychotic, or mood disturbance or anxiety ([facility] [address] F03.90                                                                                           This note documentation was performed prior to this planned procedure       after a history and physical was performed in the office. Date: 12 22 22   Office exam   1/16/2023 Immediate update no changes in H&P                        Pre Procedure Evaluation (per anesthesia or per h&p)                                                Sedation/Assessment:                                                                                               Mallampati Classification                            []Class 1                    []Class 2                    [] Class 3                  [] Class 4                                              ASA classfication         []     Class I: Normally healthy         []     Class II: Patient with mild systemic disease (e.g. hypertension)         []     Class III: Patient with severe systemic disease (e.g. CHF), non-decompensated         []     Class IV: Patient with severe systemic disease, decompensated         []     Class V: Moribund patient, survival unlikely                     Plan:   []    Egd                                [x]  Colonoscopy                                [] with Moderate Sedation /Conscious Sedation                                  [x] MAC          Patient stable for planned procedure. See orders.      Billie Deluca, MD

## 2023-01-16 NOTE — ANESTHESIA PREPROCEDURE EVALUATION
Relevant Problems   NEUROLOGY   (+) Dementia, unspecified dementia severity, unspecified dementia type, unspecified whether behavioral, psychotic, or mood disturbance or anxiety (HCC)   (+) Schizophrenia, schizo-affective (HCC)      GASTROINTESTINAL   (+) GERD (gastroesophageal reflux disease)      RENAL FAILURE   (+) Kidney tumor (benign)      ENDOCRINE   (+) Hypothyroidism       Anesthetic History   No history of anesthetic complications            Review of Systems / Medical History  Patient summary reviewed, nursing notes reviewed and pertinent labs reviewed    Pulmonary  Within defined limits                 Neuro/Psych         Psychiatric history     Cardiovascular  Within defined limits                Exercise tolerance: >4 METS     GI/Hepatic/Renal     GERD: well controlled           Endo/Other      Hypothyroidism       Other Findings   Comments: Schizophrenia    Pt has a helper person to aide her            Physical Exam    Airway  Mallampati: II  TM Distance: > 6 cm  Neck ROM: normal range of motion   Mouth opening: Normal     Cardiovascular  Regular rate and rhythm,  S1 and S2 normal,  no murmur, click, rub, or gallop             Dental  No notable dental hx       Pulmonary  Breath sounds clear to auscultation               Abdominal  GI exam deferred       Other Findings            Anesthetic Plan    ASA: 2  Anesthesia type: MAC            Anesthetic plan and risks discussed with: Patient and Healthcare power of

## 2023-01-16 NOTE — ANESTHESIA POSTPROCEDURE EVALUATION
Procedure(s):  COLONOSCOPY  ENDOSCOPIC POLYPECTOMY. MAC    Anesthesia Post Evaluation        Patient location during evaluation: PACU  Note status: Adequate. Level of consciousness: responsive to verbal stimuli and sleepy but conscious  Pain management: satisfactory to patient  Airway patency: patent  Anesthetic complications: no  Cardiovascular status: acceptable  Respiratory status: acceptable  Hydration status: acceptable  Comments: +Post-Anesthesia Evaluation and Assessment    Patient: Iglesia Stark MRN: [id number]  SSN: [id number]   YOB: 1960  Age: 63 y.o. Sex: female      Cardiovascular Function/Vital Signs    BP (!) 139/90   Pulse 83   Temp 36.7 °C (98.1 °F)   Resp 20   Ht 5' 4\" (1.626 m)   Wt 63 kg (138 lb 14.4 oz)   SpO2 99%   BMI 23.84 kg/m²     Patient is status post Procedure(s):  COLONOSCOPY  ENDOSCOPIC POLYPECTOMY. Nausea/Vomiting: Controlled. Postoperative hydration reviewed and adequate. Pain:  Pain Scale 1: Numeric (0 - 10) (01/16/23 0929)  Pain Intensity 1: 0 (01/16/23 0929)   Managed. Neurological Status: At baseline. Mental Status and Level of Consciousness: Arousable. Pulmonary Status:   O2 Device: None (01/16/23 1014)   Adequate oxygenation and airway patent. Complications related to anesthesia: None    Post-anesthesia assessment completed. No concerns. Signed By: Thalia Kwan MD    1/16/2023  Post anesthesia nausea and vomiting:  controlled      INITIAL Post-op Vital signs:   Vitals Value Taken Time   /79 01/16/23 1021   Temp     Pulse 82 01/16/23 1024   Resp 15 01/16/23 1024   SpO2     Vitals shown include unvalidated device data.

## 2023-01-16 NOTE — DISCHARGE INSTRUCTIONS
Endoscopy Discharge Instructions     Dr. Anirudh Lawler office                                            NAME: Batsheva Ramirez RECORD CGOHJK:[id number]    AGE:  63 y.o. YOB: 1960                                                              FINAL Discharge Procedure and Diagnosis:       Procedure(s):  COLONOSCOPY       FINDINGS:     Colon polyp removed                                         MEDICATIONS    [x] CONTINUE CURRENT MEDICATIONS     [] NEW MEDICATIONS           1.    2.    3.         Testing   Schedule              Colonoscopy Screening                                   Recommendations       []  Repeat colonoscopy in 6-12 month         2nd to Inadequate  prep    []  Repeat colonoscopy in 3 years    [x]  Repeat colonoscopy in 5 years    []  Repeat colonoscopy in 10 years         New additional  Tests  Call the office   [phone] for the appointment time      []      []      []                                     YOUR NEXT APPOINTMENT WITH DR Cabrera Mail:                                                                                                                                [x]   None follow up with pcp   []  1 week       []   2 week    []  1 month    Always keep KEEP  APPOINTMENT WITH  @PCP@ for regular medical follow up                                                                                                                         If you had a colonoscopy the \"C\" indicates specific instructions        x                                           Diet Instructions :   Ordinarily you may resume your previous diet but your initial diet should be       Light your discharge nurse will go over this with you. Large meals can cause  abdominal discomfort after these procedures.                                                                            Specific Diet Recommendations:        [x] High fiber diet.  https://www.[employer]. Fivejack/diets/        [] GERD diet: avoid fried and fatty foods, peppermint, chocolate, alcohol,               coffee, citrus fruits and juices, and tomato products. Avoid lying down for            2 to 3  hours after eating. https://www.zaman.com/. Fivejack/diets/            []  FODMAP DIET  DeathUnit.nl              []  All diets eg high fiber, gastroparesis. , weight loss , gluten free             1. [employer]              2.  https://www.[employer]/. Fivejack/diets/           __x__  Omaira Seeds may feel quite tired and need to rest and recuperate for several hours    following these procedures. __x__  Due to the fact that sedation was administered for this procedure, do not drive,   operate machinery or sign legal documents for the next 24 hours. __x__  Mild abdominal pain may be experienced after your procedure, but is should   disappear after several hours. Notify your physician if you have persistent pain,   tenderness or abdominal distension. __x__  C    Many patients for the first few hours following the exam may experience         belching or passing gas through the rectum. Walking may help to relieve        distention and gas pains. A warm bath or shower will often help with abdominal  cramping.                                                                                            __x__   Omaira Seeds may return to your normal routine tomorrow, according to how you feel        and depending on your doctors instructions. Be sure to call your doctor to make  an appointment for a post-surgery check-up on the date your doctor has   requested. __x__ C     Rectal bleeding or spotting in small amounts may occur with the first bowel   movement following a colonoscopy or sigmoidoscopy.  If a large amount of blood is noted call immediately __x__  Galleg[address] Loop may experience a numbness or lack of sensation in throat. If present, do not     eat or drink. Before eating, test your ability to drink with small sips of water. Y     You may try clear liquids or soups. If you tolerate these, you may then eat solid     food which is not greasy or spicy. __x__ C     IF POLYPS REMOVED: Avoid any blood thinning medication such as plavix,   aspirin or coumadin  NSAIDS (like advil or alleve) for 7 days. __x__  Notify your physician if you cough or vomit blood or experience chest pain. Your biopsy or testing result should be available in 7-10 days                                                                                                                      Prescription will be electronically sent to your pharmacy you must     let your nurse know your pharmacy:                                                                                                                                          [address] [place] [address]. TO HELP ENSURE A SMOOTH RECOVERY,       IT IS IMPORTANT TO FOLLOW THEM. _x___Pamphlet /Educational Information provided for diagnostic findings     Additional education information can assessed at the sites below:   [place]   http://www.digestive. niddk.nih.gov/ddiseases/a-z.asp      Web MD patient information                                                                                                Signature of individual given instructions :   Date: 1/16/2023                                                                                                                              Patient Education on Sedation / Analgesia Administered for Procedure      For 24 hours after general anesthesia or intravenous analgesia / sedation:  Have someone responsible help you with your care  Limit your activities  Do not drive and operate hazardous machinery  Do not make important personal, legal or business decisions  Do not drink alcoholic beverages  If you have not urinated within 8 hours after discharge, please contact your physician  Resume your medications unless otherwise instructed    For 24 hours after general anesthesia or intravenous analgesia / sedation  you may experience:  Drowsiness, dizziness, sleepiness, or confusion  Difficulty remembering or delayed reaction times  Difficulty with your balance, especially while walking, move slowly and carefully, do not make sudden position changes  Difficulty focusing or blurred vision    You may not be aware of slight changes in your behavior and/or your reaction time because of the medication used during and after your procedure.     Report the following to your physician:  Excessive pain, swelling, redness or odor of or around the surgical area  Temperature over 100.5  Nausea and vomiting lasting longer than 4 hours or if unable to take medications  Any signs of decreased circulation or nerve impairment to extremity: change in color, persistent numbness, tingling, coldness or increase pain  Any questions or concerns    IF YOU REPORT TO AN EMERGENCY ROOM, DOCTOR'S OFFICE OR HOSPITAL WITHIN 24 HOURS AFTER YOUR PROCEDURE, BRING THIS SHEET AND YOUR AFTER VISIT SUMMARY WITH YOU AND GIVE IT TO THE PHYSICIAN OR NURSE ATTENDING YOU.

## 2023-01-16 NOTE — PERIOP NOTES
Endoscopy Case End Note:    ***:  Procedure scope was pre-cleaned, per protocol, at bedside by Tang KERR      ***:  Report received from anesthesia - [address] [place] [address] . See anesthesia flowsheet for intra-procedure vital signs and events.

## 2023-01-19 ENCOUNTER — APPOINTMENT (OUTPATIENT)
Dept: CT IMAGING | Age: 63
End: 2023-01-19
Attending: PHYSICIAN ASSISTANT
Payer: MEDICARE

## 2023-01-19 ENCOUNTER — HOSPITAL ENCOUNTER (EMERGENCY)
Age: 63
Discharge: HOME | End: 2023-01-19
Attending: STUDENT IN AN ORGANIZED HEALTH CARE EDUCATION/TRAINING PROGRAM
Payer: MEDICARE

## 2023-01-19 VITALS
OXYGEN SATURATION: 98 % | HEIGHT: 64 IN | HEART RATE: 94 BPM | SYSTOLIC BLOOD PRESSURE: 120 MMHG | WEIGHT: 141.98 LBS | RESPIRATION RATE: 14 BRPM | BODY MASS INDEX: 24.24 KG/M2 | TEMPERATURE: 97.7 F | DIASTOLIC BLOOD PRESSURE: 78 MMHG

## 2023-01-19 DIAGNOSIS — S09.90XA CLOSED HEAD INJURY, INITIAL ENCOUNTER: Primary | ICD-10-CM

## 2023-01-19 DIAGNOSIS — S00.03XA SCALP HEMATOMA, INITIAL ENCOUNTER: ICD-10-CM

## 2023-01-19 PROCEDURE — 99284 EMERGENCY DEPT VISIT MOD MDM: CPT

## 2023-01-19 PROCEDURE — 70450 CT HEAD/BRAIN W/O DYE: CPT

## 2023-01-20 NOTE — ED NOTES
I have reviewed discharge instructions with the patient's caregiver and the patient. The patient's caregiver and the patient verbalized understanding. Patient left ED ambulatory with caregiver .

## 2023-01-20 NOTE — ED PROVIDER NOTES
[facility] EMERGENCY DEPT  EMERGENCY DEPARTMENT ENCOUNTER       Pt Name: Latrell Mack  MRN: [id number]  [place] 1960  Date of evaluation: 1/19/2023  Provider: MIKHAIL Contreras   PCP: Lillian Mujica MD  Note Started: 8:54 PM 1/19/23     CHIEF COMPLAINT       Chief Complaint   Patient presents with    Fall     Patient arrives with caregiver after having a fall at home. Patient was sitting on the edge of the bed and tried to push with her foot to move back and missed. Patient reports that she fell backward and hit the back of her head on bookcase. Patient has knot on the back of her head with no laceration. Patient did not lose consciousness and is not on any blood thinners. HISTORY OF PRESENT ILLNESS: 1 or more elements      History From: Patient and Caregiver  HPI Limitations : Other (intellectual disability)     Latrell Mack is a 63 y.o. female who presents BIB caregiver with head injury that occurred just prior to arrival.  The patient was witnessed to sit down on the bed, but accidentally sat on the edge, falling sideways and striking the back of her head on a bookshelf. No LOC. The patient complains of a headache at the site of the injury. Patient's caregiver states that she is acting at her baseline. Denies dizziness, vision change, vomiting. She is not anticoagulated. Took ASA PTA. Nursing Notes were all reviewed and agreed with or any disagreements were addressed in the HPI. REVIEW OF SYSTEMS      Review of Systems   Unable to perform ROS: Other (intellectual disability)      Positives and Pertinent negatives as per HPI.     PAST HISTORY     Past Medical History:  Past Medical History:   Diagnosis Date    Allergic rhinitis     Brain atrophy ([facility] [address] 02/2018    Colon polyps     COVID-19 01/21/2021    GERD (gastroesophageal reflux disease)     H/O colonoscopy     History of Papanicolaou smear of cervix 2014    Hypercholesterolemia     hypercholesterolemia    Hypothyroidism     IGT (impaired glucose tolerance)     Kidney tumor (benign)     removed in childhood     Other ill-defined conditions(799.89)     right shoulder bursitis    Psychiatric disorder     schizophrenia    Schizophrenia, schizo-affective ([address]     Thyroid disease        Past Surgical History:  Past Surgical History:   Procedure Laterality Date    COLONOSCOPY Left 10/1/2019    COLONOSCOPY performed by Ramirez Cornejo MD at [facility] ENDOSCOPY    COLONOSCOPY N/A 1/16/2023    COLONOSCOPY performed by Dre Moody MD at [facility] ENDOSCOPY    HX UROLOGICAL      tumor removal as child kidney       Family History:  Family History   Family history unknown: Yes       Social History:  Social History     Tobacco Use    Smoking status: Every Day     Packs/day: 0.25     Years: 30.00     Pack years: 7.50     Types: Cigarettes    Smokeless tobacco: Never   Vaping Use    Vaping Use: Never used   Substance Use Topics    Alcohol use: No    Drug use: No       Allergies: Allergies   Allergen Reactions    Bee Sting [Sting, Bee] Unknown (comments)     Patient states this is not an allergy      Bees [Hymenoptera Allergenic Extract] Other (comments)    Ragweed Unknown (comments)    Risperidone Other (comments)       CURRENT MEDICATIONS      Previous Medications    CHOLECALCIFEROL (VITAMIN D3) 25 MCG (1,000 UNIT) CAP    TAKE 2 TABLETS BY MOUTH ONCE DAILY FOR NUTRITIONAL SUPPLEMENT    DIVALPROEX ER (DEPAKOTE ER) 500 MG ER TABLET    Take 500 mg by mouth two (2) times a day. HALOPERIDOL (HALDOL) 10 MG TABLET    Take 10 mg by mouth nightly. HALOPERIDOL (HALDOL) 5 MG TABLET    Take 5 mg by mouth every morning. IBUPROFEN (MOTRIN) 400 MG TABLET    Take 1 Tab by mouth every eight (8) hours as needed for Pain. IBUPROFEN (MOTRIN) 600 MG TABLET    Take 1 Tab by mouth every six (6) hours as needed for Pain. LEVOTHYROXINE (SYNTHROID) 50 MCG TABLET    TAKE 1 TAB BY MOUTH DAILY BEFORE BREAKFAST    MELATONIN 3 MG TABLET    Take  by mouth.     MEMANTINE (NAMENDA) 10 MG TABLET    Take 1 Tablet by mouth two (2) times a day. MYRBETRIQ 50 MG ER TABLET    TAKE 1 TABLET BY MOUTH ONCE A DAY    NAPROXEN SODIUM (NAPROSYN) 220 MG TABLET    Take 220 mg by mouth two (2) times daily as needed. OLANZAPINE (ZYPREXA) 5 MG TABLET    Take  by mouth nightly. PANTOPRAZOLE (PROTONIX) 40 MG TABLET    TAKE 1 TABLET BY MOUTH ONCE A DAY    PAROXETINE (PAXIL) 20 MG TABLET    Take 10 mg by mouth daily. Indications: should be 10 mg    PRAVASTATIN (PRAVACHOL) 80 MG TABLET    TAKE 1 TABLET BY MOUTH EACH NIGHT AT BEDTIME    QUETIAPINE SR (SEROQUEL XR) 300 MG SR TABLET    Take 300 mg by mouth nightly. VIT B CMPLX 3-FA-VIT C-BIOTIN (NEPHRO NATHALY RX) 1- MG-MG-MCG TABLET    Take 1 Tablet by mouth daily. VITAMIN D3 25 MCG (1,000 UNIT) TABLET    TAKE 2 TABLETS BY MOUTH EVERY DAY    ZINC SULFATE (ZINCATE) 220 (50) MG CAPSULE           PHYSICAL EXAM      ED Triage Vitals [01/19/23 2048]   ED Encounter Vitals Group      /78      Pulse (Heart Rate) 94      Resp Rate 14      Temp       Temp src       O2 Sat (%) 98 %      Weight 141 lb 15.6 oz      Height 5' 4\"        Physical Exam  Vitals and nursing note reviewed. Constitutional:       General: She is not in acute distress. Appearance: She is not toxic-appearing. HENT:      Head: Normocephalic and atraumatic. No raccoon eyes or Vinson's sign. Jaw: There is normal jaw occlusion. Comments: Hematoma to posterior scalp. Skin intact throughout. Right Ear: Tympanic membrane normal.      Left Ear: Tympanic membrane normal.      Nose: Nose normal.      Mouth/Throat:      Mouth: Mucous membranes are moist.   Eyes:      Extraocular Movements: Extraocular movements intact. Conjunctiva/sclera: Conjunctivae normal.      Pupils: Pupils are equal, round, and reactive to light. Cardiovascular:      Rate and Rhythm: Normal rate and regular rhythm.    Pulmonary:      Effort: Pulmonary effort is normal.      Breath sounds: Normal breath sounds. Abdominal:      Palpations: Abdomen is soft. Tenderness: There is no abdominal tenderness. Musculoskeletal:         General: No swelling or tenderness. Normal range of motion. Cervical back: Normal range of motion and neck supple. No rigidity or tenderness. Skin:     General: Skin is warm and dry. Neurological:      General: No focal deficit present. Mental Status: She is alert and oriented to person, place, and time. Gait: Gait normal.   Psychiatric:      Comments: At baseline per caregiver        DIAGNOSTIC RESULTS   LABS:     No results found for this or any previous visit (from the past 12 hour(s)). EKG: When ordered, EKG's are interpreted by the Emergency Department Physician in the absence of a cardiologist.  Please see their note for interpretation of EKG. RADIOLOGY:  Non-plain film images such as CT, Ultrasound and MRI are read by the radiologist. Plain radiographic images are visualized and preliminarily interpreted by the ED Provider with the below findings:          Interpretation per the Radiologist below, if available at the time of this note:     CT HEAD WO CONT    Result Date: 1/19/2023  INDICATION: fall, posterior trauma. EXAM: CT HEAD without contrast. TECHNIQUE: Unenhanced CT Head is performed. CT dose reduction was achieved through use of a standardized protocol tailored for this examination and automatic exposure control for dose modulation. FINDINGS: No acute infarct is seen. There is no apparent mass on unenhanced imaging. There is no bleed, shift, obstructive hydrocephalus or significant extra-axial fluid collection. Bone windows are unremarkable. No acute intracranial finding.          PROCEDURES   Unless otherwise noted below, none  Procedures     CRITICAL CARE TIME       EMERGENCY DEPARTMENT COURSE and DIFFERENTIAL DIAGNOSIS/MDM   Vitals:    Vitals:    01/19/23 2048 01/19/23 2117   BP: 120/78    Pulse: 94    Resp: 14 Temp:  97.7 °F (36.5 °C)   SpO2: 98%    Weight: 64.4 kg (141 lb 15.6 oz)    Height: 5' 4\" (1.626 m)         Patient was given the following medications:  Medications - No data to display    CONSULTS: (Who and What was discussed)  None    Chronic Conditions: intellectual disability, psychiatric condition     Social Determinants affecting Dx or Tx: Patient lacks transportation. Intellectual disability. Records Reviewed (source and summary of external records): Nursing Notes and Old Medical Records    CC/HPI Summary, DDx, ED Course, and Reassessment:     CT head shows no acute fracture or bleed. No signs/symptoms of concussion. Advised on use of Tylenol/ibuprofen as needed for pain, supportive care at home. Follow-up with PCP. Disposition Considerations (Tests not done, Shared Decision Making, Pt Expectation of Test or Tx.):     Ddx CHI, concussion, contusion, intracranial bleed, cranial fracture    FINAL IMPRESSION     1. Closed head injury, initial encounter    2. Scalp hematoma, initial encounter          DISPOSITION/PLAN   Discharged    Discharge Note: The patient is stable for discharge home. The signs, symptoms, diagnosis, and discharge instructions have been discussed, understanding conveyed, and agreed upon. The patient is to follow up as recommended or return to ER should their symptoms worsen. PATIENT REFERRED TO:  Follow-up Information       Follow up With Specialties Details Why Contact Info    [facility] EMERGENCY DEPT Emergency Medicine  As needed, If symptoms worsen [address]    Mela Denver, MD Internal Medicine Physician Call  For follow up [facility] [address]  [phone]                DISCHARGE MEDICATIONS:  Current Discharge Medication List            DISCONTINUED MEDICATIONS:  Current Discharge Medication List          Shared Not Shared ELVIRA: I have seen and evaluated the patient.  My supervision physician was available for consultation. I am the Primary Clinician of Record. Sofy Solano, [phone] (electronically signed)    (Please note that parts of this dictation were completed with voice recognition software. Quite often unanticipated grammatical, syntax, homophones, and other interpretive errors are inadvertently transcribed by the computer software. Please disregards these errors.  Please excuse any errors that have escaped final proofreading.)

## 2023-02-03 ENCOUNTER — OFFICE VISIT (OUTPATIENT)
Dept: NEUROLOGY | Age: 63
End: 2023-02-03
Payer: MEDICARE

## 2023-02-03 VITALS
WEIGHT: 142 LBS | SYSTOLIC BLOOD PRESSURE: 124 MMHG | HEART RATE: 115 BPM | BODY MASS INDEX: 24.37 KG/M2 | OXYGEN SATURATION: 97 % | DIASTOLIC BLOOD PRESSURE: 82 MMHG

## 2023-02-03 DIAGNOSIS — F03.90 DEMENTIA, UNSPECIFIED DEMENTIA SEVERITY, UNSPECIFIED DEMENTIA TYPE, UNSPECIFIED WHETHER BEHAVIORAL, PSYCHOTIC, OR MOOD DISTURBANCE OR ANXIETY (HCC): Primary | ICD-10-CM

## 2023-02-05 NOTE — PROGRESS NOTES
Lino Cantrell is a 63 y.o. female who presents with the following  No chief complaint on file. HPI    FU for dementia   Here to go over CT results and Doppler  She is here with caregiver    She is living in a group home   She is being well cared for   She gets her medications as she should   Namenda 10 mg BID     She has some restlessness but is overall well cooperative. She does get along with most other residents. She is sleeping well   Eating well  She does go to day support  She is noticing that the memory is getting worse though   Over the past few months a lot more repeating, memory loss, confusion noticed. Allergies   Allergen Reactions    Bee Sting [Sting, Bee] Unknown (comments)     Patient states this is not an allergy      Bees [Hymenoptera Allergenic Extract] Other (comments)    Ragweed Unknown (comments)    Risperidone Other (comments)       Current Outpatient Medications   Medication Sig    levothyroxine (SYNTHROID) 50 mcg tablet TAKE 1 TAB BY MOUTH DAILY BEFORE BREAKFAST    pantoprazole (PROTONIX) 40 mg tablet TAKE 1 TABLET BY MOUTH ONCE A DAY    memantine (NAMENDA) 10 mg tablet Take 1 Tablet by mouth two (2) times a day. vit B Cmplx 3-FA-Vit C-Biotin (NEPHRO NATHALY RX) 1- mg-mg-mcg tablet Take 1 Tablet by mouth daily. pravastatin (PRAVACHOL) 80 mg tablet TAKE 1 TABLET BY MOUTH EACH NIGHT AT BEDTIME    Myrbetriq 50 mg ER tablet TAKE 1 TABLET BY MOUTH ONCE A DAY    cholecalciferol (VITAMIN D3) 25 mcg (1,000 unit) cap TAKE 2 TABLETS BY MOUTH ONCE DAILY FOR NUTRITIONAL SUPPLEMENT    Vitamin D3 25 mcg (1,000 unit) tablet TAKE 2 TABLETS BY MOUTH EVERY DAY    ibuprofen (MOTRIN) 600 mg tablet Take 1 Tab by mouth every six (6) hours as needed for Pain. zinc sulfate (ZINCATE) 220 (50) mg capsule     QUEtiapine SR (SEROquel XR) 300 mg sr tablet Take 300 mg by mouth nightly.    haloperidol (HALDOL) 5 mg tablet Take 5 mg by mouth every morning.     melatonin 3 mg tablet Take  by mouth.    OLANZapine (ZYPREXA) 5 mg tablet Take  by mouth nightly.    haloperidol (HALDOL) 10 mg tablet Take 10 mg by mouth nightly. naproxen sodium (NAPROSYN) 220 mg tablet Take 220 mg by mouth two (2) times daily as needed. PARoxetine (PAXIL) 20 mg tablet Take 10 mg by mouth daily. Indications: should be 10 mg    ibuprofen (MOTRIN) 400 mg tablet Take 1 Tab by mouth every eight (8) hours as needed for Pain.    divalproex ER (DEPAKOTE ER) 500 mg ER tablet Take 500 mg by mouth two (2) times a day. No current facility-administered medications for this visit.        Social History     Tobacco Use   Smoking Status Every Day    Packs/day: 0.25    Years: 30.00    Pack years: 7.50    Types: Cigarettes   Smokeless Tobacco Never       Past Medical History:   Diagnosis Date    Allergic rhinitis     Brain atrophy ([facility] [address] 02/2018    Colon polyps     COVID-19 01/21/2021    GERD (gastroesophageal reflux disease)     H/O colonoscopy     History of Papanicolaou smear of cervix 2014    Hypercholesterolemia     hypercholesterolemia    Hypothyroidism     IGT (impaired glucose tolerance)     Kidney tumor (benign)     removed in childhood     Other ill-defined conditions(799.89)     right shoulder bursitis    Psychiatric disorder     schizophrenia    Schizophrenia, schizo-affective ([facility] [address]     Thyroid disease        Past Surgical History:   Procedure Laterality Date    COLONOSCOPY Left 10/1/2019    COLONOSCOPY performed by Bozena Quach MD at [facility] ENDOSCOPY    COLONOSCOPY N/A 1/16/2023    COLONOSCOPY performed by Liya Cronin MD at [facility] ENDOSCOPY    HX UROLOGICAL      tumor removal as child kidney       Family History   Family history unknown: Yes       Social History     Socioeconomic History    Marital status: SINGLE   Tobacco Use    Smoking status: Every Day     Packs/day: 0.25     Years: 30.00     Pack years: 7.50     Types: Cigarettes    Smokeless tobacco: Never   Vaping Use    Vaping Use: Never used   Substance and Sexual Activity    Alcohol use: No    Drug use: No    Sexual activity: Not Currently     Partners: Male   Social History Narrative    ** Merged History Encounter **            Review of Systems   Eyes:  Negative for blurred vision, double vision and photophobia. Respiratory:  Negative for shortness of breath and wheezing. Gastrointestinal:  Negative for nausea and vomiting. Psychiatric/Behavioral:  Positive for memory loss. Remainder of comprehensive review is negative. Physical Exam :    Visit Vitals  /82 (BP 1 Location: Left upper arm, BP Patient Position: Sitting, BP Cuff Size: Adult)   Pulse (!) 115   Wt 64.4 kg (142 lb)   SpO2 97%   BMI 24.37 kg/m²         Results for orders placed or performed in visit on 10/19/22    COLONOSCOPY   Result Value Ref Range    Colonoscopy, External         Orders Placed This Encounter     Adilene Cazares Neuropsych[facility] EMPL     Referral Priority:   Routine     Referral Type:   Consultation     Referral Reason:   Specialty Services Required     Referred to Provider:   Radha Souza PsyD     Number of Visits Requested:   1       1.  Dementia, unspecified dementia severity, unspecified dementia type, unspecified whether behavioral, psychotic, or mood disturbance or anxiety ([facility] [address]      Discussed testing so far in full  We will get a referral to Dr. Caryle Reveal  to evaluate closer at to where we are and what stages of things  This will help us guide treatment better  Continue current medications  Continue to stay active and engage              This note will not be viewable in MyChart

## 2023-02-21 DIAGNOSIS — K21.9 GASTROESOPHAGEAL REFLUX DISEASE WITHOUT ESOPHAGITIS: ICD-10-CM

## 2023-02-21 RX ORDER — PANTOPRAZOLE SODIUM 40 MG/1
40 TABLET, DELAYED RELEASE ORAL DAILY
Qty: 31 TABLET | Refills: 2 | Status: SHIPPED | OUTPATIENT
Start: 2023-02-21

## 2023-02-21 NOTE — TELEPHONE ENCOUNTER
PCP: Joni Marti MD    Last appt: 11/3/2022  Future Appointments   Date Time Provider Parmjit Contreras   5/5/2023 10:40 AM Diya Tenorio NP [facility] AM[facility]       Requested Prescriptions     Pending Prescriptions Disp Refills    pantoprazole (PROTONIX) 40 mg tablet 31 Tablet 2     Sig: Take 1 Tablet by mouth daily.

## 2023-03-06 NOTE — TELEPHONE ENCOUNTER
Notified supervisor of group home that rx was approved and sent to pharm Pt discharged home with wife. All belongings with patient. No new meds started. IV removed. AVS removed, no further questions. Tolerating diet.

## 2023-03-16 RX ORDER — MEMANTINE HYDROCHLORIDE 10 MG/1
TABLET ORAL
Qty: 62 TABLET | Refills: 0 | Status: SHIPPED | OUTPATIENT
Start: 2023-03-16

## 2023-03-25 LAB
CREATININE, EXTERNAL: 0.55
HBA1C MFR BLD HPLC: 5.8 %
LDL CHOLESTEROL, EXTERNAL: 95

## 2023-03-26 LAB — MICROALBUMIN URINE, EXTERNAL: 4.7

## 2023-04-07 ENCOUNTER — TRANSCRIBE ORDER (OUTPATIENT)
Dept: REGISTRATION | Age: 63
End: 2023-04-07

## 2023-04-07 ENCOUNTER — HOSPITAL ENCOUNTER (OUTPATIENT)
Dept: GENERAL RADIOLOGY | Age: 63
End: 2023-04-07
Payer: MEDICARE

## 2023-05-09 NOTE — PROGRESS NOTES
Thyroid may be slightly low. But, continue the same dose for now. We can recheck it at her follow up appt. Other labs are all normal.  Continue her current medications. Patient/Caregiver provided printed discharge information.

## 2023-07-29 ENCOUNTER — HOSPITAL ENCOUNTER (EMERGENCY)
Facility: HOSPITAL | Age: 63
Discharge: HOME | End: 2023-07-29
Payer: MEDICAID

## 2023-07-29 ENCOUNTER — APPOINTMENT (OUTPATIENT)
Facility: HOSPITAL | Age: 63
End: 2023-07-29
Payer: MEDICAID

## 2023-07-29 VITALS
DIASTOLIC BLOOD PRESSURE: 79 MMHG | OXYGEN SATURATION: 96 % | BODY MASS INDEX: 25.88 KG/M2 | SYSTOLIC BLOOD PRESSURE: 124 MMHG | WEIGHT: 150.79 LBS | HEART RATE: 107 BPM | TEMPERATURE: 98.8 F | RESPIRATION RATE: 16 BRPM

## 2023-07-29 DIAGNOSIS — S09.90XA CLOSED HEAD INJURY, INITIAL ENCOUNTER: Primary | ICD-10-CM

## 2023-07-29 PROCEDURE — 70450 CT HEAD/BRAIN W/O DYE: CPT

## 2023-07-29 PROCEDURE — 99284 EMERGENCY DEPT VISIT MOD MDM: CPT

## 2023-07-29 ASSESSMENT — PAIN SCALES - GENERAL: PAINLEVEL_OUTOF10: 0

## 2023-07-29 NOTE — ED PROVIDER NOTES
[facility] EMERGENCY DEPT  EMERGENCY DEPARTMENT ENCOUNTER       Pt Name: Kalina Oneal  MRN: [id number]  [facility] 1960  Date of evaluation: 7/29/2023  Provider: MORENITA Hancock   PCP: Lakisha Wayne MD  Note Started:   @LPQKR 7/29/23     CHIEF COMPLAINT       Chief Complaint   Patient presents with    Head Injury     Slipped coming out of shower, hit her head on bath tub. No loc. Hit back right side of head. Pt is not on a blood thinner. [facility] saw her told her to be check in ED, today        HISTORY OF PRESENT ILLNESS: 1 or more elements      History From: Patient, caregiver from assisted living facility   HPI Limitations: Other (psychiatric disorder, cognitive delay)     Kalina Oneal is a 66 y.o. female who presents BIB caregiver with request for head CT in setting of head injury that occurred yesterday. The patient had a mechanical slip and fall while in the shower, striking the back of her head. There was no LOC. She complains of posterior headache that is bothered her since the fall, worse overnight last night. [facility] evaluated her and referred her here for head CT. She denies dizziness, lightheadedness, change in visual acuity, nausea, vomiting, numbness, tingling, weakness. She is behaving in her normal state of health per her caregiver. She is not anticoagulated. Nursing Notes were all reviewed and agreed with or any disagreements were addressed in the HPI. REVIEW OF SYSTEMS      Review of Systems   Unable to perform ROS: Psychiatric disorder   Constitutional:  Negative for diaphoresis. Neurological:  Positive for headaches. Negative for dizziness. Hematological:  Does not bruise/bleed easily. Positives and Pertinent negatives as per HPI.     PAST HISTORY     Past Medical History:  Past Medical History:   Diagnosis Date    Allergic rhinitis     Brain atrophy ([address]) 02/2018    Colon polyps     COVID-19 01/21/2021    GERD (gastroesophageal reflux disease)

## 2023-09-13 ENCOUNTER — OFFICE VISIT (OUTPATIENT)
Age: 63
End: 2023-09-13
Payer: MEDICARE

## 2023-09-13 VITALS — SYSTOLIC BLOOD PRESSURE: 114 MMHG | HEART RATE: 82 BPM | DIASTOLIC BLOOD PRESSURE: 72 MMHG | OXYGEN SATURATION: 98 %

## 2023-09-13 DIAGNOSIS — F03.90 DEMENTIA WITHOUT BEHAVIORAL DISTURBANCE, PSYCHOTIC DISTURBANCE, MOOD DISTURBANCE, OR ANXIETY, UNSPECIFIED DEMENTIA SEVERITY, UNSPECIFIED DEMENTIA TYPE (HCC): Primary | ICD-10-CM

## 2023-09-13 PROCEDURE — 99214 OFFICE O/P EST MOD 30 MIN: CPT | Performed by: NURSE PRACTITIONER

## 2023-09-15 RX ORDER — MEMANTINE HYDROCHLORIDE 10 MG/1
10 TABLET ORAL 2 TIMES DAILY
Qty: 62 TABLET | Refills: 0 | Status: SHIPPED | OUTPATIENT
Start: 2023-09-15

## 2023-09-15 NOTE — PROGRESS NOTES
Since LOV has had some decline   Sleep has been ok, at times does have a difficult time falling asleep   Some hallucinations at times     Mentions having back pains, being followed by other physicians
[place]  -     External Referral To Neurology       Discussed memory in full  Continue to keep her active and engage physically and mentally  She is being well cared for and is in a group home so she is doing well  We never were able to get her in with neuropsychiatry so we will rerefer to Dr. Nahomi Vizcarra in [place] to evaluate her memory closer as primarily looking at dementia versus cognitive impairment            This note will not be viewable in [address] [place] [address]

## 2023-09-19 RX ORDER — VIT B COMP NO.3/FOLIC/C/BIOTIN 1 MG-60 MG
1 TABLET ORAL DAILY
Qty: 31 TABLET | Refills: 0 | Status: SHIPPED | OUTPATIENT
Start: 2023-09-19

## 2023-09-20 RX ORDER — PANTOPRAZOLE SODIUM 40 MG/1
40 TABLET, DELAYED RELEASE ORAL DAILY
Qty: 90 TABLET | Refills: 3 | Status: SHIPPED | OUTPATIENT
Start: 2023-09-20

## 2023-09-20 RX ORDER — VIT B COMP NO.3/FOLIC/C/BIOTIN 1 MG-60 MG
1 TABLET ORAL DAILY
Qty: 90 TABLET | Refills: 3 | OUTPATIENT
Start: 2023-09-20

## 2023-10-11 RX ORDER — MEMANTINE HYDROCHLORIDE 10 MG/1
10 TABLET ORAL 2 TIMES DAILY
Qty: 62 TABLET | Refills: 0 | Status: SHIPPED | OUTPATIENT
Start: 2023-10-11

## 2023-11-03 ENCOUNTER — TELEPHONE (OUTPATIENT)
Age: 63
End: 2023-11-03

## 2023-11-03 NOTE — TELEPHONE ENCOUNTER
Pt's daughter came into the office asking a refill for the mirabegron (myrbetriq) 50 MG.  Please send to the [address] [place] [address] [phone] Dionisio Collins [phone]   [address] [place] [address]   Phone:  [phone]  Fax:  [phone]

## 2023-11-21 ENCOUNTER — TELEPHONE (OUTPATIENT)
Age: 63
End: 2023-11-21

## 2023-11-21 NOTE — TELEPHONE ENCOUNTER
----- Message from Lauro Has sent at 11/21/2023  3:56 PM EST -----  Subject: Appointment Request    Reason for Call: Established Patient Appointment needed: Routine Physical   Exam    QUESTIONS    Reason for appointment request? No appointments available during search     Additional Information for Provider? Patients caregiver is calling in to   reschedule the patients annual physical because she got caught up at   another appointment. Any day or time.   ---------------------------------------------------------------------------  --------------  Sharon MILLER  [phone]; OK to leave message on voicemail  ---------------------------------------------------------------------------  --------------  SCRIPT ANSWERS

## 2023-11-22 ENCOUNTER — HOSPITAL ENCOUNTER (OUTPATIENT)
Facility: HOSPITAL | Age: 63
Discharge: HOME | End: 2023-11-25
Attending: INTERNAL MEDICINE
Payer: MEDICARE

## 2023-11-22 VITALS — HEIGHT: 64 IN | BODY MASS INDEX: 25.61 KG/M2 | WEIGHT: 150 LBS

## 2023-11-22 DIAGNOSIS — Z12.31 SCREENING MAMMOGRAM FOR HIGH-RISK PATIENT: ICD-10-CM

## 2023-11-22 PROCEDURE — 77063 BREAST TOMOSYNTHESIS BI: CPT

## 2023-12-07 RX ORDER — MIRABEGRON 50 MG/1
50 TABLET, FILM COATED, EXTENDED RELEASE ORAL DAILY
Qty: 31 TABLET | Refills: 0 | Status: SHIPPED | OUTPATIENT
Start: 2023-12-07

## 2023-12-07 RX ORDER — VIT B COMP NO.3/FOLIC/C/BIOTIN 1 MG-60 MG
1 TABLET ORAL DAILY
Qty: 31 TABLET | Refills: 0 | Status: SHIPPED | OUTPATIENT
Start: 2023-12-07

## 2023-12-22 DIAGNOSIS — F03.90 DEMENTIA WITHOUT BEHAVIORAL DISTURBANCE, PSYCHOTIC DISTURBANCE, MOOD DISTURBANCE, OR ANXIETY, UNSPECIFIED DEMENTIA SEVERITY, UNSPECIFIED DEMENTIA TYPE (HCC): Primary | ICD-10-CM

## 2023-12-26 ENCOUNTER — OFFICE VISIT (OUTPATIENT)
Age: 63
End: 2023-12-26
Payer: MEDICARE

## 2023-12-26 VITALS
TEMPERATURE: 97.4 F | DIASTOLIC BLOOD PRESSURE: 75 MMHG | OXYGEN SATURATION: 94 % | WEIGHT: 154.6 LBS | HEART RATE: 98 BPM | SYSTOLIC BLOOD PRESSURE: 111 MMHG | BODY MASS INDEX: 26.4 KG/M2 | RESPIRATION RATE: 14 BRPM | HEIGHT: 64 IN

## 2023-12-26 DIAGNOSIS — E87.1 HYPONATREMIA: ICD-10-CM

## 2023-12-26 DIAGNOSIS — E03.9 HYPOTHYROIDISM, UNSPECIFIED TYPE: ICD-10-CM

## 2023-12-26 DIAGNOSIS — R73.03 PREDIABETES: ICD-10-CM

## 2023-12-26 DIAGNOSIS — D75.89 MACROCYTOSIS: Primary | ICD-10-CM

## 2023-12-26 PROCEDURE — 99214 OFFICE O/P EST MOD 30 MIN: CPT | Performed by: STUDENT IN AN ORGANIZED HEALTH CARE EDUCATION/TRAINING PROGRAM

## 2023-12-27 RX ORDER — MEMANTINE HYDROCHLORIDE 10 MG/1
10 TABLET ORAL 2 TIMES DAILY
Qty: 60 TABLET | Refills: 2 | Status: SHIPPED | OUTPATIENT
Start: 2023-12-27

## 2023-12-29 ENCOUNTER — TELEPHONE (OUTPATIENT)
Age: 63
End: 2023-12-29

## 2023-12-29 NOTE — TELEPHONE ENCOUNTER
[phone]  Attkeisha CEBALLOS  With [facility]      Needs:  Most recent office note  Letter/order stating what meds are ok to give OTC

## 2024-01-05 ENCOUNTER — NURSE ONLY (OUTPATIENT)
Age: 64
End: 2024-01-05

## 2024-01-05 DIAGNOSIS — Z23 ENCOUNTER FOR IMMUNIZATION: ICD-10-CM

## 2024-01-05 DIAGNOSIS — Z23 ENCOUNTER FOR IMMUNIZATION: Primary | ICD-10-CM

## 2024-01-05 DIAGNOSIS — E87.1 HYPONATREMIA: ICD-10-CM

## 2024-01-05 DIAGNOSIS — R73.03 PREDIABETES: ICD-10-CM

## 2024-01-05 DIAGNOSIS — D75.89 MACROCYTOSIS: ICD-10-CM

## 2024-01-05 DIAGNOSIS — E03.9 HYPOTHYROIDISM, UNSPECIFIED TYPE: ICD-10-CM

## 2024-01-06 LAB
ALBUMIN SERPL-MCNC: 3.4 G/DL (ref 3.5–5)
ALBUMIN/GLOB SERPL: 0.9 (ref 1.1–2.2)
ALP SERPL-CCNC: 130 U/L (ref 45–117)
ALT SERPL-CCNC: 38 U/L (ref 12–78)
ANION GAP SERPL CALC-SCNC: 4 MMOL/L (ref 5–15)
AST SERPL-CCNC: 35 U/L (ref 15–37)
BASOPHILS # BLD: 0 K/UL (ref 0–0.1)
BASOPHILS NFR BLD: 0 % (ref 0–1)
BILIRUB SERPL-MCNC: 0.3 MG/DL (ref 0.2–1)
BUN SERPL-MCNC: 11 MG/DL (ref 6–20)
BUN/CREAT SERPL: 15 (ref 12–20)
CALCIUM SERPL-MCNC: 9 MG/DL (ref 8.5–10.1)
CHLORIDE SERPL-SCNC: 97 MMOL/L (ref 97–108)
CO2 SERPL-SCNC: 30 MMOL/L (ref 21–32)
CREAT SERPL-MCNC: 0.75 MG/DL (ref 0.55–1.02)
DIFFERENTIAL METHOD BLD: ABNORMAL
EOSINOPHIL # BLD: 0 K/UL (ref 0–0.4)
EOSINOPHIL NFR BLD: 1 % (ref 0–7)
ERYTHROCYTE [DISTWIDTH] IN BLOOD BY AUTOMATED COUNT: 13.2 % (ref 11.5–14.5)
EST. AVERAGE GLUCOSE BLD GHB EST-MCNC: 123 MG/DL
GLOBULIN SER CALC-MCNC: 3.7 G/DL (ref 2–4)
GLUCOSE SERPL-MCNC: 127 MG/DL (ref 65–100)
HBA1C MFR BLD: 5.9 % (ref 4–5.6)
HCT VFR BLD AUTO: 39.1 % (ref 35–47)
HGB BLD-MCNC: 12.7 G/DL (ref 11.5–16)
IMM GRANULOCYTES # BLD AUTO: 0 K/UL (ref 0–0.04)
IMM GRANULOCYTES NFR BLD AUTO: 1 % (ref 0–0.5)
LYMPHOCYTES # BLD: 2.4 K/UL (ref 0.8–3.5)
LYMPHOCYTES NFR BLD: 41 % (ref 12–49)
MCH RBC QN AUTO: 31.3 PG (ref 26–34)
MCHC RBC AUTO-ENTMCNC: 32.5 G/DL (ref 30–36.5)
MCV RBC AUTO: 96.3 FL (ref 80–99)
MONOCYTES # BLD: 0.5 K/UL (ref 0–1)
MONOCYTES NFR BLD: 8 % (ref 5–13)
NEUTS SEG # BLD: 3 K/UL (ref 1.8–8)
NEUTS SEG NFR BLD: 49 % (ref 32–75)
NRBC # BLD: 0 K/UL (ref 0–0.01)
NRBC BLD-RTO: 0 PER 100 WBC
PLATELET # BLD AUTO: 290 K/UL (ref 150–400)
PMV BLD AUTO: 10.9 FL (ref 8.9–12.9)
POTASSIUM SERPL-SCNC: 4.5 MMOL/L (ref 3.5–5.1)
PROT SERPL-MCNC: 7.1 G/DL (ref 6.4–8.2)
RBC # BLD AUTO: 4.06 M/UL (ref 3.8–5.2)
SODIUM SERPL-SCNC: 131 MMOL/L (ref 136–145)
TSH SERPL DL<=0.05 MIU/L-ACNC: 5.95 UIU/ML (ref 0.36–3.74)
VIT B12 SERPL-MCNC: 771 PG/ML (ref 193–986)
WBC # BLD AUTO: 6 K/UL (ref 3.6–11)

## 2024-01-10 LAB
M TB IFN-G BLD-IMP: NEGATIVE
M TB IFN-G CD4+ T-CELLS BLD-ACNC: 0.02 IU/ML
M TBIFN-G CD4+ CD8+T-CELLS BLD-ACNC: 0.21 IU/ML
QUANTIFERON CRITERIA: NORMAL
QUANTIFERON MITOGEN VALUE: 9.37 IU/ML
QUANTIFERON NIL VALUE: 0.02 IU/ML

## 2024-01-26 ENCOUNTER — NURSE ONLY (OUTPATIENT)
Age: 64
End: 2024-01-26

## 2024-01-26 DIAGNOSIS — E87.1 HYPONATREMIA: ICD-10-CM

## 2024-01-26 DIAGNOSIS — E03.9 HYPOTHYROIDISM, UNSPECIFIED TYPE: ICD-10-CM

## 2024-01-27 LAB
ANION GAP SERPL CALC-SCNC: 8 MMOL/L (ref 5–15)
BUN SERPL-MCNC: 9 MG/DL (ref 6–20)
BUN/CREAT SERPL: 15 (ref 12–20)
CALCIUM SERPL-MCNC: 8.9 MG/DL (ref 8.5–10.1)
CHLORIDE SERPL-SCNC: 104 MMOL/L (ref 97–108)
CO2 SERPL-SCNC: 25 MMOL/L (ref 21–32)
CREAT SERPL-MCNC: 0.6 MG/DL (ref 0.55–1.02)
GLUCOSE SERPL-MCNC: 104 MG/DL (ref 65–100)
OSMOLALITY SERPL: 288 MOSM/KG H2O
OSMOLALITY UR: 578 MOSM/KG H2O
POTASSIUM SERPL-SCNC: 4.4 MMOL/L (ref 3.5–5.1)
SODIUM SERPL-SCNC: 137 MMOL/L (ref 136–145)
T4 FREE SERPL-MCNC: 0.8 NG/DL (ref 0.8–1.5)
TSH SERPL DL<=0.05 MIU/L-ACNC: 3.83 UIU/ML (ref 0.36–3.74)

## 2024-01-28 DIAGNOSIS — E03.9 HYPOTHYROIDISM, UNSPECIFIED TYPE: Primary | ICD-10-CM

## 2024-01-28 RX ORDER — LEVOTHYROXINE SODIUM 0.07 MG/1
75 TABLET ORAL DAILY
Qty: 90 TABLET | Refills: 1 | Status: SHIPPED | OUTPATIENT
Start: 2024-01-28

## 2024-01-29 DIAGNOSIS — E87.1 HYPONATREMIA: Primary | ICD-10-CM

## 2024-01-29 NOTE — RESULT ENCOUNTER NOTE
Your TSH (Thyroid Stimulating Hormone) is slightly high and thyroid hormone (T4) is low normal. I sent a prescription for levothyroxine 75 mcg to your pharmacy. Take 75 mcg Monday, Wed, and Friday. Continue to take 50 mcg the rest of the week. Please come back to the office to have your thyroid function tests done again in 2 months.     Lab work is otherwise normal.

## 2024-01-30 LAB
COLLECT DURATION TIME UR: 24 HR
SODIUM 24H UR-SRATE: 74 MMOL/24HR (ref 40–220)
SPECIMEN VOL 24H UR: 700 ML

## 2024-01-31 RX ORDER — PRAVASTATIN SODIUM 80 MG/1
TABLET ORAL
Qty: 90 TABLET | Refills: 3 | Status: SHIPPED | OUTPATIENT
Start: 2024-01-31

## 2024-02-02 RX ORDER — MIRABEGRON 50 MG/1
50 TABLET, FILM COATED, EXTENDED RELEASE ORAL DAILY
Qty: 90 TABLET | Refills: 1 | Status: SHIPPED | OUTPATIENT
Start: 2024-02-02

## 2024-02-02 RX ORDER — VIT B COMP NO.3/FOLIC/C/BIOTIN 1 MG-60 MG
1 TABLET ORAL DAILY
Qty: 90 TABLET | Refills: 1 | Status: SHIPPED | OUTPATIENT
Start: 2024-02-02

## 2024-02-06 RX ORDER — MIRABEGRON 50 MG/1
50 TABLET, FILM COATED, EXTENDED RELEASE ORAL DAILY
Qty: 31 TABLET | Refills: 2 | Status: SHIPPED | OUTPATIENT
Start: 2024-02-06

## 2024-02-06 RX ORDER — VIT B COMP NO.3/FOLIC/C/BIOTIN 1 MG-60 MG
1 TABLET ORAL DAILY
Qty: 31 TABLET | Refills: 2 | Status: SHIPPED | OUTPATIENT
Start: 2024-02-06

## 2024-02-14 NOTE — TELEPHONE ENCOUNTER
"Wound Care Consult     Visit Date: 2/14/2024      Patient Name: Cadence Cui         MRN: [id number]           YOB: 2020     Reason for Consult: Cadence Johnston seen today with RBC 5 High Risk Skin Rounds. No family at the bedside, seen with nursing and sitter.     With Assessment: Pressure points intact. Head palpated with thick dark hair, she is in a bouncy seat in a bubble crib, has other seating options, she moves self around. Tracheostomy site is intact, has intact and normopigmented neck skin under the ties. Tracheostomy with soft ties and a split gauze in place around the tracheostomy. G-tube site intact, open to air, getting standard care. Diaper area with intact skin. She is getting Critic-Aid Moisture Barrier Cream with diaper care. She has a bubble crib and additional seating options. Repositioned with nursing, discussed skin care with nursing.       Recommendation: Monitor tracheostomy site. Appreciate Surgical Recommendations. Cleanse and moisturize per division standards. Monitor skin.    Standard trach care: Daily trach tie change: Remove current product from neck.  Cleanse neck with soap and water, then water, then dry neck.  Apply Cavilon No-sting barrier and allow to air dry for 20 seconds.  Apply Mepilex Light to neck where trach ties will lay.  Attach new trach ties to trach and secure.  Twice a day tracheostomy care: Remove split gauze from around tracheostomy tube.  Cleanse tracheostomy site with soap and water, then water, then dry.  Apply new split gauze around tracheostomy tube.  Standard GT Care: Cleanse twice daily per division standards.  Apply a split gauze around stem if needed.  Standard Diaper Care: Continue to use Critic-Aid Moisture Barrier Cream with each diaper change.  Apply a small amount of Critic-Aid Moisture Barrier Cream and rub it into the skin in the diaper area.  The cream should appear clear and the area should look like \"shiny lip gloss\".  Apply Critic-Aid " Requesting copy of last office visit notes. Please fax to [phone]. Moisture Barrier Cream with each diaper change.      Supplies are available at the bedside.     Bedside RN aware of recommendations.      Plan:  call with questions or if condition changes.      Patricia WHITLOCK CWON  Certified Wound and Ostomy Nurse   Secure Chat  Pager #[phone]      I spent 35 minutes in the care of this patient.      KAMLESH Hill  2/14/2024  1:42 PM

## 2024-02-16 NOTE — PROGRESS NOTES
Goal Outcome Evaluation:  Plan of Care Reviewed With: patient        Progress: no change          Pt is alert and oriented X 4. VSS. RA. Pain controlled with prn norco and prn valium for muscle spasm.70 ml drainage from the YESENIA. Voiding well.          HISTORY OF PRESENT ILLNESS   Karine Gandara is a 66 y.o. female who  has a past medical history of Allergic rhinitis, Brain atrophy ([address]), Colon polyps, COVID-19, GERD (gastroesophageal reflux disease), H/O colonoscopy, History of Papanicolaou smear of cervix, Hypercholesterolemia, Hypothyroidism, IGT (impaired glucose tolerance), Kidney tumor (benign), Other ill-defined conditions(799.89), Psychiatric disorder, Schizophrenia, schizo-affective ([address]), and Thyroid disease. Pt presents for yearly physical exam.    No acute complaints today. States she has been doing well since the last visit. She is here to complete physical exam/paperwork. Hypothyroidism: taking synthroid 50 mcg daily. Tobacco use: smoking about 3 cigarettes per day. She refuses to quit and does not agree that it is causing or may cause health problems for her. She did at one point since the last visit quit for 3 months. Schizophrenia: she is still following every 3 mo with psychology, currently on depakote, haldol, seroquel, olanzapine, and paxil       Dementia: following with neurology, on memantine        Macrocytosis: taking V97 and folic acid       Hypovitaminosis D: on cholecalciferol       Hypercholesterolemia: LDL 95 on 3/25/23, on pravastatin 80 mg           HM:  Flu shot: 11/2023 at   Covid: possibly obtained at , advised to check and obtain if not already done. PNA:  Tdap:   Shingles: dose 1 on 11/3/22, advised to obtain from pharmacy. RSV: advised to obtain from the pharmacy.   Hep C Screen:   HIV screen:  LDCT:  Colonoscopy: due 1/2028  Pap: due 6/1/24  Mammogram: 11/22/23  Dexa:   eye exam:  dentist:  Julien Hillman:          Active Ambulatory Problems     Diagnosis Date Noted    Dysphagia 10/31/2018    COVID-19 01/21/2021    Kidney tumor (benign)     Allergic rhinitis     Hypothyroidism     GERD (gastroesophageal reflux disease)     Schizophrenia, schizo-affective (HCC)     Hypercholesterolemia     Contusion of

## 2024-03-20 ENCOUNTER — OFFICE VISIT (OUTPATIENT)
Age: 64
End: 2024-03-20
Payer: MEDICARE

## 2024-03-20 VITALS — SYSTOLIC BLOOD PRESSURE: 116 MMHG | HEART RATE: 113 BPM | DIASTOLIC BLOOD PRESSURE: 84 MMHG | OXYGEN SATURATION: 93 %

## 2024-03-20 DIAGNOSIS — F03.90 DEMENTIA WITHOUT BEHAVIORAL DISTURBANCE, PSYCHOTIC DISTURBANCE, MOOD DISTURBANCE, OR ANXIETY, UNSPECIFIED DEMENTIA SEVERITY, UNSPECIFIED DEMENTIA TYPE (HCC): Primary | ICD-10-CM

## 2024-03-20 DIAGNOSIS — F51.01 PRIMARY INSOMNIA: ICD-10-CM

## 2024-03-20 PROBLEM — G21.19 OTHER DRUG INDUCED SECONDARY PARKINSONISM (HCC): Status: ACTIVE | Noted: 2024-03-20

## 2024-03-20 PROCEDURE — 99214 OFFICE O/P EST MOD 30 MIN: CPT | Performed by: NURSE PRACTITIONER

## 2024-03-20 RX ORDER — PHENOL 1.4 %
AEROSOL, SPRAY (ML) MUCOUS MEMBRANE
Qty: 90 TABLET | Refills: 1 | Status: SHIPPED | OUTPATIENT
Start: 2024-03-20

## 2024-03-20 RX ORDER — MEMANTINE HYDROCHLORIDE 10 MG/1
10 TABLET ORAL 2 TIMES DAILY
Qty: 180 TABLET | Refills: 1 | Status: SHIPPED | OUTPATIENT
Start: 2024-03-20

## 2024-03-21 NOTE — PROGRESS NOTES
Memory said to change some, said progressing   Said evenings deals with sundowning sx    
There is no significant white matter disease.  Is no enhancing lesion or masses her.    Impression  impression: Prominent atrophy.      CT Result (most recent):  CT HEAD WO CONTRAST 07/29/2023    Narrative  EXAM: CT HEAD WO CONTRAST    INDICATION: posterior trauma    COMPARISON: January 19, 2023.    CONTRAST: None.    TECHNIQUE: Unenhanced CT of the head was performed using 5 mm images. Brain and  bone windows were generated. Coronal and sagittal reformats. CT dose reduction  was achieved through use of a standardized protocol tailored for this  examination and automatic exposure control for dose modulation.    FINDINGS:  No extra-axial fluid collection hemorrhage shift or masses.    Impression  No acute changes.      EEG Result:      Carotid Doppler:        Recent Labs:  Lab Results   Component Value Date    WBC 6.0 01/05/2024    HGB 12.7 01/05/2024    HCT 39.1 01/05/2024    MCV 96.3 01/05/2024     01/05/2024     Lab Results   Component Value Date     01/26/2024    K 4.4 01/26/2024     01/26/2024    CO2 25 01/26/2024    BUN 9 01/26/2024    CREATININE 0.60 01/26/2024    GLUCOSE 104 (H) 01/26/2024    CALCIUM 8.9 01/26/2024    PROT 7.1 01/05/2024    LABALBU 3.4 (L) 01/05/2024    BILITOT 0.3 01/05/2024    ALKPHOS 130 (H) 01/05/2024    AST 35 01/05/2024    ALT 38 01/05/2024    LABGLOM >60 01/26/2024    GFRAA >60 06/02/2022    AGRATIO 0.9 (L) 01/05/2024    GLOB 3.7 01/05/2024       Lab Results   Component Value Date    CHOL 145 04/18/2022     Lab Results   Component Value Date    TRIG 122 04/18/2022     Lab Results   Component Value Date    HDL 43 04/18/2022     Lab Results   Component Value Date    LDLCALC 80 04/18/2022     Lab Results   Component Value Date    VLDL 22 04/18/2022     No results found for: \"CHOLHDLRATIO\"    No results found for: \"SEDRATE\"    Hemoglobin A1C   Date Value Ref Range Status   01/05/2024 5.9 (H) 4.0 - 5.6 % Final     Comment:     (NOTE)  HbA1C Interpretive Ranges  <5.7

## 2024-04-01 ENCOUNTER — HOSPITAL ENCOUNTER (EMERGENCY)
Facility: HOSPITAL | Age: 64
Discharge: HOME | End: 2024-04-02
Payer: MEDICARE

## 2024-04-01 ENCOUNTER — APPOINTMENT (OUTPATIENT)
Facility: HOSPITAL | Age: 64
End: 2024-04-01
Payer: MEDICARE

## 2024-04-01 DIAGNOSIS — S42.352A CLOSED DISPLACED COMMINUTED FRACTURE OF SHAFT OF LEFT HUMERUS, INITIAL ENCOUNTER: Primary | ICD-10-CM

## 2024-04-01 PROCEDURE — 99283 EMERGENCY DEPT VISIT LOW MDM: CPT

## 2024-04-01 PROCEDURE — 6370000000 HC RX 637 (ALT 250 FOR IP)

## 2024-04-01 PROCEDURE — 73060 X-RAY EXAM OF HUMERUS: CPT

## 2024-04-01 PROCEDURE — 73100 X-RAY EXAM OF WRIST: CPT

## 2024-04-01 PROCEDURE — 73110 X-RAY EXAM OF WRIST: CPT

## 2024-04-01 RX ORDER — IBUPROFEN 600 MG/1
600 TABLET ORAL
Status: COMPLETED | OUTPATIENT
Start: 2024-04-01 | End: 2024-04-01

## 2024-04-01 RX ADMIN — IBUPROFEN 600 MG: 600 TABLET, FILM COATED ORAL at 23:17

## 2024-04-01 ASSESSMENT — PAIN - FUNCTIONAL ASSESSMENT: PAIN_FUNCTIONAL_ASSESSMENT: 0-10

## 2024-04-01 ASSESSMENT — PAIN DESCRIPTION - LOCATION: LOCATION: ARM

## 2024-04-01 ASSESSMENT — PAIN SCALES - GENERAL: PAINLEVEL_OUTOF10: 10

## 2024-04-01 ASSESSMENT — PAIN DESCRIPTION - DESCRIPTORS: DESCRIPTORS: ACHING

## 2024-04-01 ASSESSMENT — PAIN DESCRIPTION - ORIENTATION: ORIENTATION: LEFT

## 2024-04-02 VITALS
DIASTOLIC BLOOD PRESSURE: 80 MMHG | HEIGHT: 64 IN | WEIGHT: 160.5 LBS | SYSTOLIC BLOOD PRESSURE: 140 MMHG | BODY MASS INDEX: 27.4 KG/M2 | TEMPERATURE: 97.5 F | RESPIRATION RATE: 16 BRPM | HEART RATE: 100 BPM | OXYGEN SATURATION: 100 %

## 2024-04-02 PROCEDURE — 6370000000 HC RX 637 (ALT 250 FOR IP)

## 2024-04-02 RX ORDER — ACETAMINOPHEN 500 MG
1000 TABLET ORAL EVERY 6 HOURS PRN
Qty: 40 TABLET | Refills: 0 | Status: SHIPPED | OUTPATIENT
Start: 2024-04-02

## 2024-04-02 RX ORDER — IBUPROFEN 600 MG/1
600 TABLET ORAL EVERY 6 HOURS PRN
Qty: 40 TABLET | Refills: 0 | Status: SHIPPED | OUTPATIENT
Start: 2024-04-02

## 2024-04-02 RX ORDER — HYDROCODONE BITARTRATE AND ACETAMINOPHEN 5; 325 MG/1; MG/1
1 TABLET ORAL
Status: COMPLETED | OUTPATIENT
Start: 2024-04-02 | End: 2024-04-02

## 2024-04-02 RX ADMIN — HYDROCODONE BITARTRATE AND ACETAMINOPHEN 1 TABLET: 5; 325 TABLET ORAL at 01:09

## 2024-04-02 NOTE — DISCHARGE INSTRUCTIONS
effects or interactions with other medications, please call your primary care provider or contact the ER.

## 2024-04-02 NOTE — ED PROVIDER NOTES
medications prescribed for you. Read the directions carefully, and ask your doctor or other care provider to review them with you.                    DISCONTINUED MEDICATIONS:  Current Discharge Medication List      I have seen and evaluated the patient autonomously. My supervision physician was on site and available for consultation if needed.       I am the Primary Clinician of Record: ARTEM Harper NP (electronically signed)    (Please note that parts of this dictation were completed with voice recognition software. Quite often unanticipated grammatical, syntax, homophones, and other interpretive errors are inadvertently transcribed by the computer software. Please disregards these errors. Please excuse any errors that have escaped final proofreading.)     Heather Wilson APRN - NP  04/03/24 [id number]

## 2024-04-02 NOTE — ED TRIAGE NOTES
Pt presents to the ED with c/o falling on her left arm. Reports not being able to move it due to pain.

## 2024-05-10 ENCOUNTER — TELEPHONE (OUTPATIENT)
Age: 64
End: 2024-05-10

## 2024-05-10 NOTE — TELEPHONE ENCOUNTER
States pt needs surgery for broken arm and is scheduled for next Thursday    Doesn't know if they need a pre-op or clearance from pcp.    States issues recently with thyroid (labs from 4/9 done by [facility] had abnormal thyroid results)    States would like dr to check pt out.      Please call to advise what should be scheduled or what is needed.

## 2024-05-10 NOTE — TELEPHONE ENCOUNTER
Spoke to MsStephani Jaylen and schedule pt appt on 5/15 at 2:00; Ms. York said she would try and get pt here Monday for lab work.

## 2024-05-13 ENCOUNTER — NURSE ONLY (OUTPATIENT)
Age: 64
End: 2024-05-13

## 2024-05-13 DIAGNOSIS — E03.9 HYPOTHYROIDISM, UNSPECIFIED TYPE: ICD-10-CM

## 2024-05-13 LAB
T4 FREE SERPL-MCNC: 0.9 NG/DL (ref 0.8–1.5)
TSH SERPL DL<=0.05 MIU/L-ACNC: 7.53 UIU/ML (ref 0.36–3.74)

## 2024-05-13 NOTE — TELEPHONE ENCOUNTER
Pt will be coming in today for labs.  There needs to be lab orders put in the system.  She has to have these for Wed.

## 2024-05-15 ENCOUNTER — OFFICE VISIT (OUTPATIENT)
Age: 64
End: 2024-05-15
Payer: MEDICARE

## 2024-05-15 VITALS
BODY MASS INDEX: 27.64 KG/M2 | DIASTOLIC BLOOD PRESSURE: 71 MMHG | HEART RATE: 90 BPM | SYSTOLIC BLOOD PRESSURE: 98 MMHG | TEMPERATURE: 98.4 F | OXYGEN SATURATION: 98 % | WEIGHT: 161 LBS

## 2024-05-15 DIAGNOSIS — Z01.818 PRE-OP EVALUATION: Primary | ICD-10-CM

## 2024-05-15 DIAGNOSIS — E03.9 HYPOTHYROIDISM, UNSPECIFIED TYPE: ICD-10-CM

## 2024-05-15 DIAGNOSIS — R29.818 SUSPECTED SLEEP APNEA: ICD-10-CM

## 2024-05-15 PROCEDURE — 99213 OFFICE O/P EST LOW 20 MIN: CPT | Performed by: STUDENT IN AN ORGANIZED HEALTH CARE EDUCATION/TRAINING PROGRAM

## 2024-05-15 RX ORDER — LEVOTHYROXINE SODIUM 0.1 MG/1
100 TABLET ORAL DAILY
Qty: 90 TABLET | Refills: 1 | Status: SHIPPED | OUTPATIENT
Start: 2024-05-15

## 2024-05-15 SDOH — ECONOMIC STABILITY: FOOD INSECURITY: WITHIN THE PAST 12 MONTHS, THE FOOD YOU BOUGHT JUST DIDN'T LAST AND YOU DIDN'T HAVE MONEY TO GET MORE.: PATIENT DECLINED

## 2024-05-15 SDOH — ECONOMIC STABILITY: HOUSING INSECURITY
IN THE LAST 12 MONTHS, WAS THERE A TIME WHEN YOU DID NOT HAVE A STEADY PLACE TO SLEEP OR SLEPT IN A SHELTER (INCLUDING NOW)?: PATIENT DECLINED

## 2024-05-15 SDOH — ECONOMIC STABILITY: INCOME INSECURITY: HOW HARD IS IT FOR YOU TO PAY FOR THE VERY BASICS LIKE FOOD, HOUSING, MEDICAL CARE, AND HEATING?: PATIENT DECLINED

## 2024-05-15 SDOH — ECONOMIC STABILITY: FOOD INSECURITY: WITHIN THE PAST 12 MONTHS, YOU WORRIED THAT YOUR FOOD WOULD RUN OUT BEFORE YOU GOT MONEY TO BUY MORE.: PATIENT DECLINED

## 2024-05-15 ASSESSMENT — PATIENT HEALTH QUESTIONNAIRE - PHQ9: DEPRESSION UNABLE TO ASSESS: FUNCTIONAL CAPACITY MOTIVATION LIMITS ACCURACY

## 2024-05-15 NOTE — PROGRESS NOTES
Identified pt with two pt identifiers(name and ). Reviewed record in preparation for visit and have obtained necessary documentation.  Chief Complaint   Patient presents with    Pre-op Exam     Surgery to fix broken arm tomorrow 24 at [facility], getting plates and pins, does not have form with them  per caregiver       Vitals:    05/15/24 1419   BP: 98/71   Pulse: 90   Temp: 98.4 °F (36.9 °C)   TempSrc: Temporal   SpO2: 98%   Weight: 73 kg (161 lb)      Pain Scale: 0 - No pain/10  Health Maintenance Due   Topic    HIV screen     Respiratory Syncytial Virus (RSV) Pregnant or age 60 yrs+ (1 - 1-dose 60+ series)    Shingles vaccine (2 of 2)    Pneumococcal 0-64 years Vaccine (2 of 2 - PCV)    COVID-19 Vaccine ( -  season)    Depression Screen     Annual Wellness Visit (Medicare Advantage)     Lipids     Cervical cancer screen      Coordination of Care Questionnaire:  :   1. Have you been to the ER, urgent care clinic since your last visit?  Hospitalized since your last visit?yes ER for arm, in chart   2. Have you seen or consulted any other health care providers outside of the [facility] since your last visit?  Include any pap smears or colon screening. no

## 2024-05-15 NOTE — PROGRESS NOTES
HISTORY OF PRESENT ILLNESS   Odilia Hong is a 64 y.o. female who  has a past medical history of Allergic rhinitis, Brain atrophy (HCC), Colon polyps, COVID-19, GERD (gastroesophageal reflux disease), H/O colonoscopy, History of Papanicolaou smear of cervix, Hypercholesterolemia, Hypothyroidism, IGT (impaired glucose tolerance), Kidney tumor (benign), Other ill-defined conditions(799.89), Psychiatric disorder, Schizophrenia, schizo-affective (HCC), and Thyroid disease.     Pt presents for hypothyroidism and ?pre-op?    Patient here with caretaker who explains that she recently has TSH and A1c done w psychiatry and were found to be abnormal. Caretaker wondering if the surgery should be delayed.     -Surgery planned/date: 5/16/24, repair of L humerus fx.     No hx of MI   No chest pain in the last 30 days.   No swelling of the legs, shortness of breath, orthopnea or PND  No hx of cardiac arrhythmia   No valve disease  Last echo: never  Last A1c: 5.7    Exercise tolerance: able to go up 2 sets of stairs w/o chest pain or shortness of breath.       Risk factors for sleep apnea: Snore loudly  Tired, fatigues, or daytime sleepiness  Age > 50    No history of anemia, thrombocytopenia or easy bleeding or bruising:    Not on anticoagulation, No dysuria, change in urine frequency, change in urine color or odor. No skin rashes or sores. No fevers    - Tobacco use: quit  - Alcohol use: none  - Drug use: none      No acute complaints today. States she has been doing well since the last visit.     Hypothyroidism:   TSH elevated on Monday, t4 low normal.   Denies heat/cold intolerance, fatigue, diarrhea/constipation, rapid/slow heart beat, anxiety, insomnia.   Pt has gained wt in last 6 months, which they attribute to increased PO intake, lack of portion control.   Confirms she is taking medication properly daily.      Schizophrenia: she is still following every 3 mo with psychology, currently on depakote, haldol, seroquel,

## 2024-05-16 LAB
T4 FREE SERPL-MCNC: 0.9 NG/DL (ref 0.8–1.5)
TSH SERPL DL<=0.05 MIU/L-ACNC: 4.33 UIU/ML (ref 0.36–3.74)

## 2024-05-16 NOTE — ASSESSMENT & PLAN NOTE
Would not delay surgery for subclinical hypothyroid state or prediabetes range A1c. Patient otherwise does not have any known heart disease and seems to be able to tolerate at least 4 METs therefore low to average risk. Still consideration include possible undiagnosed sleep apnea, dementia and schizophrenia which may make delirium more likely.

## 2024-06-14 PROBLEM — Z01.818 PRE-OP EVALUATION: Status: RESOLVED | Noted: 2024-05-15 | Resolved: 2024-06-14

## 2024-07-10 RX ORDER — MIRABEGRON 50 MG/1
50 TABLET, FILM COATED, EXTENDED RELEASE ORAL DAILY
Qty: 90 TABLET | Refills: 1 | Status: SHIPPED | OUTPATIENT
Start: 2024-07-10

## 2024-07-10 RX ORDER — VIT B COMP NO.3/FOLIC/C/BIOTIN 1 MG-60 MG
1 TABLET ORAL DAILY
Qty: 90 TABLET | Refills: 1 | Status: SHIPPED | OUTPATIENT
Start: 2024-07-10

## 2024-08-07 ENCOUNTER — OFFICE VISIT (OUTPATIENT)
Age: 64
End: 2024-08-07
Payer: MEDICARE

## 2024-08-07 VITALS
RESPIRATION RATE: 20 BRPM | SYSTOLIC BLOOD PRESSURE: 120 MMHG | HEART RATE: 102 BPM | DIASTOLIC BLOOD PRESSURE: 83 MMHG | WEIGHT: 158 LBS | TEMPERATURE: 97.2 F | BODY MASS INDEX: 26.98 KG/M2 | HEIGHT: 64 IN | OXYGEN SATURATION: 96 %

## 2024-08-07 DIAGNOSIS — E78.00 HYPERCHOLESTEROLEMIA: ICD-10-CM

## 2024-08-07 DIAGNOSIS — E03.9 HYPOTHYROIDISM, UNSPECIFIED TYPE: Primary | ICD-10-CM

## 2024-08-07 DIAGNOSIS — S42.322G CLOSED DISPLACED TRANSVERSE FRACTURE OF SHAFT OF LEFT HUMERUS WITH DELAYED HEALING, SUBSEQUENT ENCOUNTER: ICD-10-CM

## 2024-08-07 DIAGNOSIS — E03.9 HYPOTHYROIDISM, UNSPECIFIED TYPE: ICD-10-CM

## 2024-08-07 PROCEDURE — 99214 OFFICE O/P EST MOD 30 MIN: CPT | Performed by: STUDENT IN AN ORGANIZED HEALTH CARE EDUCATION/TRAINING PROGRAM

## 2024-08-07 RX ORDER — IBUPROFEN 600 MG/1
600 TABLET ORAL EVERY 8 HOURS PRN
Qty: 40 TABLET | Refills: 0 | Status: SHIPPED | OUTPATIENT
Start: 2024-08-07

## 2024-08-07 ASSESSMENT — PATIENT HEALTH QUESTIONNAIRE - PHQ9
SUM OF ALL RESPONSES TO PHQ QUESTIONS 1-9: 0
2. FEELING DOWN, DEPRESSED OR HOPELESS: NOT AT ALL
SUM OF ALL RESPONSES TO PHQ9 QUESTIONS 1 & 2: 0
SUM OF ALL RESPONSES TO PHQ QUESTIONS 1-9: 0
1. LITTLE INTEREST OR PLEASURE IN DOING THINGS: NOT AT ALL

## 2024-08-07 NOTE — PROGRESS NOTES
HISTORY OF PRESENT ILLNESS   Odilia Hong is a 64 y.o. female who  has a past medical history of Allergic rhinitis, Brain atrophy (HCC), Colon polyps, COVID-19, GERD (gastroesophageal reflux disease), H/O colonoscopy, History of Papanicolaou smear of cervix, Hypercholesterolemia, Hypothyroidism, IGT (impaired glucose tolerance), Kidney tumor (benign), Other ill-defined conditions(799.89), Psychiatric disorder, Schizophrenia, schizo-affective (HCC), and Thyroid disease.     Pt presents for hypothyroidism    Pt c/o that she wants the metal inessa out of her arm now.  Caretaker states that she will intermittently have pain still.   Surgery  5/16/24, repair of L humerus fx.   Swelling has very slowly been improved.         Hypothyroidism:   5/15/24 Uncontrolled, changes made today: Increase levothyroxine to 100 mcg, plan to recheck tfts in 1 mo but not done.  8/7/24 Denies heat/cold intolerance, fatigue, diarrhea/constipation, rapid/slow heart beat, anxiety, insomnia, weight loss/gain.      RECALL FROM PRIOR:     Schizophrenia: following every 3 mo with psychology, currently on depakote, haldol, seroquel, olanzapine, and paxil       Dementia: following with neurology, on memantine        Macrocytosis: taking b12 and folic acid       Hypovitaminosis D: on cholecalciferol       Hypercholesterolemia: LDL 95 on 3/25/23, on pravastatin 80 mg      Smoking:    HM:  Flu shot: 11/2023 at   Covid: possibly obtained at , advised to check and obtain if not already done.   PNA: due  Tdap:   Shingles: dose 1 on 11/3/22, advised to obtain from pharmacy.  RSV: advised to obtain from the pharmacy.  Hep C Screen:   HIV screen:  LDCT:  Colonoscopy: due 1/2028  Pap: due 6/1/24  Mammogram: 11/22/23  Dexa:   eye exam:  dentist:  JACOB:          Active Ambulatory Problems     Diagnosis Date Noted    Dysphagia 10/31/2018    COVID-19 01/21/2021    Kidney tumor (benign)     Allergic rhinitis     Hypothyroidism     GERD

## 2024-08-07 NOTE — PROGRESS NOTES
\"Have you been to the ER, urgent care clinic since your last visit?  Hospitalized since your last visit?\"    NO    “Have you seen or consulted any other health care providers outside of [facility] since your last visit?”    NO     “Have you had a pap smear?”    2022 normal    Date of last Cervical Cancer screen (HPV or PAP): 6/1/2021             Click Here for Release of Records Request

## 2024-08-08 LAB
ALBUMIN SERPL-MCNC: 3.3 G/DL (ref 3.5–5)
ALBUMIN/GLOB SERPL: 0.9 (ref 1.1–2.2)
ALP SERPL-CCNC: 153 U/L (ref 45–117)
ALT SERPL-CCNC: 25 U/L (ref 12–78)
ANION GAP SERPL CALC-SCNC: 6 MMOL/L (ref 5–15)
AST SERPL-CCNC: 23 U/L (ref 15–37)
BILIRUB SERPL-MCNC: 0.2 MG/DL (ref 0.2–1)
BUN SERPL-MCNC: 12 MG/DL (ref 6–20)
BUN/CREAT SERPL: 21 (ref 12–20)
CALCIUM SERPL-MCNC: 9.5 MG/DL (ref 8.5–10.1)
CHLORIDE SERPL-SCNC: 104 MMOL/L (ref 97–108)
CHOLEST SERPL-MCNC: 213 MG/DL
CO2 SERPL-SCNC: 28 MMOL/L (ref 21–32)
CREAT SERPL-MCNC: 0.58 MG/DL (ref 0.55–1.02)
GLOBULIN SER CALC-MCNC: 3.6 G/DL (ref 2–4)
GLUCOSE SERPL-MCNC: 138 MG/DL (ref 65–100)
HDLC SERPL-MCNC: 55 MG/DL
HDLC SERPL: 3.9 (ref 0–5)
LDLC SERPL CALC-MCNC: 93.2 MG/DL (ref 0–100)
POTASSIUM SERPL-SCNC: 4.5 MMOL/L (ref 3.5–5.1)
PROT SERPL-MCNC: 6.9 G/DL (ref 6.4–8.2)
SODIUM SERPL-SCNC: 138 MMOL/L (ref 136–145)
T4 FREE SERPL-MCNC: 1 NG/DL (ref 0.8–1.5)
TRIGL SERPL-MCNC: 324 MG/DL
TSH SERPL DL<=0.05 MIU/L-ACNC: 1.23 UIU/ML (ref 0.36–3.74)
VLDLC SERPL CALC-MCNC: 64.8 MG/DL

## 2024-08-15 NOTE — RESULT ENCOUNTER NOTE
Thyroid labs have normalized.   Triglycerides are high, cut down on fried food, fatty food, and sweets.   Remainder of lab work looks ok.

## 2024-08-16 NOTE — RESULT ENCOUNTER NOTE
Patient's care giver, Sarina,notified of results and response from Miguel Zarate MD    States understanding       Next appt Visit date not found

## 2024-08-26 ENCOUNTER — OFFICE VISIT (OUTPATIENT)
Age: 64
End: 2024-08-26
Payer: MEDICARE

## 2024-08-26 VITALS
OXYGEN SATURATION: 94 % | DIASTOLIC BLOOD PRESSURE: 85 MMHG | HEART RATE: 90 BPM | BODY MASS INDEX: 27.13 KG/M2 | HEIGHT: 64 IN | SYSTOLIC BLOOD PRESSURE: 127 MMHG | WEIGHT: 158.9 LBS | TEMPERATURE: 98 F

## 2024-08-26 DIAGNOSIS — G47.33 OBSTRUCTIVE SLEEP APNEA (ADULT) (PEDIATRIC): Primary | ICD-10-CM

## 2024-08-26 PROCEDURE — 99203 OFFICE O/P NEW LOW 30 MIN: CPT | Performed by: INTERNAL MEDICINE

## 2024-08-26 ASSESSMENT — SLEEP AND FATIGUE QUESTIONNAIRES
HOW LIKELY ARE YOU TO NOD OFF OR FALL ASLEEP WHILE SITTING INACTIVE IN A PUBLIC PLACE: HIGH CHANCE OF DOZING
HOW LIKELY ARE YOU TO NOD OFF OR FALL ASLEEP IN A CAR, WHILE STOPPED FOR A FEW MINUTES IN TRAFFIC: MODERATE CHANCE OF DOZING
ESS TOTAL SCORE: 20
HOW LIKELY ARE YOU TO NOD OFF OR FALL ASLEEP WHILE SITTING QUIETLY AFTER LUNCH WITHOUT ALCOHOL: HIGH CHANCE OF DOZING
HOW LIKELY ARE YOU TO NOD OFF OR FALL ASLEEP WHILE SITTING AND READING: MODERATE CHANCE OF DOZING
HOW LIKELY ARE YOU TO NOD OFF OR FALL ASLEEP WHILE LYING DOWN TO REST IN THE AFTERNOON WHEN CIRCUMSTANCES PERMIT: HIGH CHANCE OF DOZING
HOW LIKELY ARE YOU TO NOD OFF OR FALL ASLEEP WHEN YOU ARE A PASSENGER IN A CAR FOR AN HOUR WITHOUT A BREAK: HIGH CHANCE OF DOZING
HOW LIKELY ARE YOU TO NOD OFF OR FALL ASLEEP WHILE SITTING AND TALKING TO SOMEONE: MODERATE CHANCE OF DOZING
HOW LIKELY ARE YOU TO NOD OFF OR FALL ASLEEP WHILE WATCHING TV: MODERATE CHANCE OF DOZING

## 2024-08-26 NOTE — PROGRESS NOTES
Medications:     ibuprofen (ADVIL;MOTRIN) 600 MG tablet, Take 1 tablet by mouth every 8 hours as needed for Pain, Disp: 40 tablet, Rfl: 0    mirabegron (MYRBETRIQ) 50 MG TB24, TAKE 1 TABLET BY MOUTH ONCE A DAY, Disp: 90 tablet, Rfl: 1    B Complex-C-Folic Acid (CHARLI-LEONARDO RX) 1 MG TABS, TAKE 1 TABLET BY MOUTH ONCE A DAY, Disp: 90 tablet, Rfl: 1    levothyroxine (SYNTHROID) 100 MCG tablet, Take 1 tablet by mouth daily, Disp: 90 tablet, Rfl: 1    Cholecalciferol (VITAMIN D3) 25 MCG (1000 UT) CAPS, TAKE 2 TABLETS BY MOUTH ONCE DAILY FOR NUTRITIONAL SUPPLEMENT, Disp: 90 capsule, Rfl: 3    acetaminophen (TYLENOL) 500 MG tablet, Take 2 tablets by mouth every 6 hours as needed for Pain, Disp: 40 tablet, Rfl: 0    Melatonin 10 MG TABS, 1 tablet by mouth nightly PRN, Disp: 90 tablet, Rfl: 1    memantine (NAMENDA) 10 MG tablet, Take 1 tablet by mouth 2 times daily, Disp: 180 tablet, Rfl: 1    pravastatin (PRAVACHOL) 80 MG tablet, TAKE 1 TABLET BY MOUTH EACH NIGHT AT BEDTIME, Disp: 90 tablet, Rfl: 3    pantoprazole (PROTONIX) 40 MG tablet, Take 1 tablet by mouth daily, Disp: 90 tablet, Rfl: 3    divalproex (DEPAKOTE ER) 500 MG extended release tablet, Take 1 tablet by mouth 2 times daily, Disp: , Rfl:     haloperidol (HALDOL) 10 MG tablet, Take 1 tablet by mouth nightly, Disp: , Rfl:     naproxen sodium (ANAPROX) 220 MG tablet, Take 1 tablet by mouth 2 times daily as needed, Disp: , Rfl:     OLANZapine (ZYPREXA) 5 MG tablet, Take by mouth, Disp: , Rfl:     PARoxetine (PAXIL) 20 MG tablet, Take 0.5 tablets by mouth daily, Disp: , Rfl:     QUEtiapine (SEROQUEL XR) 300 MG extended release tablet, Take 1 tablet by mouth nightly, Disp: , Rfl:     zinc sulfate (ZINCATE) 220 (50 Zn) MG capsule, ceived the following from [facility]: Outside name: zinc sulfate (ZINCATE) 220 (50) mg capsule, Disp: , Rfl:      She  has a past medical history of Allergic rhinitis, Brain atrophy (HCC), Colon polyps, COVID-19, GERD

## 2024-08-26 NOTE — PATIENT INSTRUCTIONS
[address]  [place] [address]  Tel.  [phone]  Fax. [phone] [address]  [place] [address]  Tel.  [phone]  Fax. [phone] [address] [place] [address]  Tel.  [phone]  Fax. [phone]     Sleep Apnea: After Your Visit  Your Care Instructions  Sleep apnea occurs when you frequently stop breathing for 10 seconds or longer during sleep. It can be mild to severe, based on the number of times per hour that you stop breathing or have slowed breathing. Blocked or narrowed airways in your nose, mouth, or throat can cause sleep apnea. Your airway can become blocked when your throat muscles and tongue relax during sleep.  Sleep apnea is common, occurring in 1 out of 20 individuals.  Individuals having any of the following characteristics should be evaluated and treated right away due to high risk and detrimental consequences from untreated sleep apnea:  Obesity  Congestive Heart failure  Atrial Fibrillation  Uncontrolled Hypertension  Type II Diabetes  Night-time Arrhythmias  Stroke  Pulmonary Hypertension  High-risk Driving Populations (pilots, truck drivers, etc.)  Patients Considering Weight-loss Surgery    How do you know you have sleep apnea?  You probably have sleep apnea if you answer 'yes' to 3 or more of the following questions:  S - Have you been told that you Snore?   T - Are you often Tired during the day?  O - Has anyone Observed you stop breathing while sleeping?  P- Do you have (or are being treated for) high blood Pressure?    B - Are you obese (Body Mass Index > 35)?  A - Is your Age 50 years old or older?  N - Is your Neck size greater than 16 inches?  G - Are you male Gender?  A sleep physician can prescribe a breathing device that prevents tissues in the throat from blocking your airway. Or your doctor may recommend using a dental device (oral breathing device) to help keep your airway open. In some cases, surgery may

## 2024-09-20 ENCOUNTER — APPOINTMENT (OUTPATIENT)
Facility: HOSPITAL | Age: 64
End: 2024-09-20
Payer: MEDICARE

## 2024-09-20 ENCOUNTER — HOSPITAL ENCOUNTER (EMERGENCY)
Facility: HOSPITAL | Age: 64
Discharge: HOME | End: 2024-09-20
Attending: STUDENT IN AN ORGANIZED HEALTH CARE EDUCATION/TRAINING PROGRAM
Payer: MEDICARE

## 2024-09-20 VITALS
OXYGEN SATURATION: 100 % | BODY MASS INDEX: 28.15 KG/M2 | TEMPERATURE: 98 F | DIASTOLIC BLOOD PRESSURE: 73 MMHG | HEIGHT: 63 IN | HEART RATE: 87 BPM | RESPIRATION RATE: 18 BRPM | SYSTOLIC BLOOD PRESSURE: 109 MMHG

## 2024-09-20 DIAGNOSIS — R10.32 LEFT LOWER QUADRANT ABDOMINAL PAIN: Primary | ICD-10-CM

## 2024-09-20 LAB
ALBUMIN SERPL-MCNC: 3 G/DL (ref 3.5–5)
ALBUMIN/GLOB SERPL: 0.8 (ref 1.1–2.2)
ALP SERPL-CCNC: 145 U/L (ref 45–117)
ALT SERPL-CCNC: 19 U/L (ref 12–78)
ANION GAP SERPL CALC-SCNC: 8 MMOL/L (ref 2–12)
APPEARANCE UR: CLEAR
AST SERPL-CCNC: 20 U/L (ref 15–37)
BACTERIA URNS QL MICRO: NEGATIVE /HPF
BASOPHILS # BLD: 0 K/UL (ref 0–0.1)
BASOPHILS NFR BLD: 1 % (ref 0–1)
BILIRUB SERPL-MCNC: 0.3 MG/DL (ref 0.2–1)
BILIRUB UR QL: NEGATIVE
BUN SERPL-MCNC: 9 MG/DL (ref 6–20)
BUN/CREAT SERPL: 16 (ref 12–20)
CALCIUM SERPL-MCNC: 9 MG/DL (ref 8.5–10.1)
CHLORIDE SERPL-SCNC: 102 MMOL/L (ref 97–108)
CO2 SERPL-SCNC: 28 MMOL/L (ref 21–32)
COLOR UR: ABNORMAL
CREAT SERPL-MCNC: 0.58 MG/DL (ref 0.55–1.02)
DIFFERENTIAL METHOD BLD: ABNORMAL
EOSINOPHIL # BLD: 0.1 K/UL (ref 0–0.4)
EOSINOPHIL NFR BLD: 1 % (ref 0–7)
EPITH CASTS URNS QL MICRO: ABNORMAL /LPF
ERYTHROCYTE [DISTWIDTH] IN BLOOD BY AUTOMATED COUNT: 14.8 % (ref 11.5–14.5)
GLOBULIN SER CALC-MCNC: 3.8 G/DL (ref 2–4)
GLUCOSE SERPL-MCNC: 99 MG/DL (ref 65–100)
GLUCOSE UR STRIP.AUTO-MCNC: NEGATIVE MG/DL
HCT VFR BLD AUTO: 37.4 % (ref 35–47)
HGB BLD-MCNC: 12.1 G/DL (ref 11.5–16)
HGB UR QL STRIP: NEGATIVE
IMM GRANULOCYTES # BLD AUTO: 0.1 K/UL (ref 0–0.04)
IMM GRANULOCYTES NFR BLD AUTO: 1 % (ref 0–0.5)
KETONES UR QL STRIP.AUTO: ABNORMAL MG/DL
LEUKOCYTE ESTERASE UR QL STRIP.AUTO: NEGATIVE
LIPASE SERPL-CCNC: 15 U/L (ref 13–75)
LYMPHOCYTES # BLD: 3.8 K/UL (ref 0.8–3.5)
LYMPHOCYTES NFR BLD: 49 % (ref 12–49)
MCH RBC QN AUTO: 30 PG (ref 26–34)
MCHC RBC AUTO-ENTMCNC: 32.4 G/DL (ref 30–36.5)
MCV RBC AUTO: 92.8 FL (ref 80–99)
MONOCYTES # BLD: 0.8 K/UL (ref 0–1)
MONOCYTES NFR BLD: 10 % (ref 5–13)
NEUTS SEG # BLD: 3 K/UL (ref 1.8–8)
NEUTS SEG NFR BLD: 38 % (ref 32–75)
NITRITE UR QL STRIP.AUTO: NEGATIVE
NRBC # BLD: 0 K/UL (ref 0–0.01)
NRBC BLD-RTO: 0 PER 100 WBC
PH UR STRIP: 7 (ref 5–8)
PLATELET # BLD AUTO: 321 K/UL (ref 150–400)
PMV BLD AUTO: 9.8 FL (ref 8.9–12.9)
POTASSIUM SERPL-SCNC: 4.3 MMOL/L (ref 3.5–5.1)
PROT SERPL-MCNC: 6.8 G/DL (ref 6.4–8.2)
PROT UR STRIP-MCNC: NEGATIVE MG/DL
RBC # BLD AUTO: 4.03 M/UL (ref 3.8–5.2)
RBC #/AREA URNS HPF: ABNORMAL /HPF (ref 0–5)
SODIUM SERPL-SCNC: 138 MMOL/L (ref 136–145)
SP GR UR REFRACTOMETRY: 1.02
URINE CULTURE IF INDICATED: ABNORMAL
UROBILINOGEN UR QL STRIP.AUTO: 1 EU/DL (ref 0.2–1)
WBC # BLD AUTO: 7.7 K/UL (ref 3.6–11)
WBC URNS QL MICRO: ABNORMAL /HPF (ref 0–4)

## 2024-09-20 PROCEDURE — 36415 COLL VENOUS BLD VENIPUNCTURE: CPT

## 2024-09-20 PROCEDURE — 80053 COMPREHEN METABOLIC PANEL: CPT

## 2024-09-20 PROCEDURE — 74177 CT ABD & PELVIS W/CONTRAST: CPT

## 2024-09-20 PROCEDURE — 81001 URINALYSIS AUTO W/SCOPE: CPT

## 2024-09-20 PROCEDURE — 6360000004 HC RX CONTRAST MEDICATION: Performed by: STUDENT IN AN ORGANIZED HEALTH CARE EDUCATION/TRAINING PROGRAM

## 2024-09-20 PROCEDURE — 6360000002 HC RX W HCPCS: Performed by: STUDENT IN AN ORGANIZED HEALTH CARE EDUCATION/TRAINING PROGRAM

## 2024-09-20 PROCEDURE — 99285 EMERGENCY DEPT VISIT HI MDM: CPT

## 2024-09-20 PROCEDURE — 85025 COMPLETE CBC W/AUTO DIFF WBC: CPT

## 2024-09-20 PROCEDURE — 83690 ASSAY OF LIPASE: CPT

## 2024-09-20 PROCEDURE — 96374 THER/PROPH/DIAG INJ IV PUSH: CPT

## 2024-09-20 RX ORDER — IOPAMIDOL 755 MG/ML
100 INJECTION, SOLUTION INTRAVASCULAR
Status: COMPLETED | OUTPATIENT
Start: 2024-09-20 | End: 2024-09-20

## 2024-09-20 RX ORDER — KETOROLAC TROMETHAMINE 30 MG/ML
15 INJECTION, SOLUTION INTRAMUSCULAR; INTRAVENOUS
Status: COMPLETED | OUTPATIENT
Start: 2024-09-20 | End: 2024-09-20

## 2024-09-20 RX ADMIN — IOPAMIDOL 100 ML: 755 INJECTION, SOLUTION INTRAVENOUS at 03:38

## 2024-09-20 RX ADMIN — KETOROLAC TROMETHAMINE 15 MG: 30 INJECTION, SOLUTION INTRAMUSCULAR at 02:05

## 2024-09-20 ASSESSMENT — LIFESTYLE VARIABLES
HOW MANY STANDARD DRINKS CONTAINING ALCOHOL DO YOU HAVE ON A TYPICAL DAY: PATIENT DOES NOT DRINK
HOW OFTEN DO YOU HAVE A DRINK CONTAINING ALCOHOL: NEVER

## 2024-09-20 ASSESSMENT — PAIN DESCRIPTION - ORIENTATION: ORIENTATION: LEFT;LOWER

## 2024-09-20 ASSESSMENT — PAIN SCALES - GENERAL: PAINLEVEL_OUTOF10: 6

## 2024-09-20 ASSESSMENT — PAIN - FUNCTIONAL ASSESSMENT: PAIN_FUNCTIONAL_ASSESSMENT: NONE - DENIES PAIN

## 2024-09-20 ASSESSMENT — PAIN DESCRIPTION - DESCRIPTORS: DESCRIPTORS: ACHING

## 2024-09-20 ASSESSMENT — PAIN DESCRIPTION - LOCATION: LOCATION: ABDOMEN

## 2024-10-07 DIAGNOSIS — F03.90 DEMENTIA WITHOUT BEHAVIORAL DISTURBANCE, PSYCHOTIC DISTURBANCE, MOOD DISTURBANCE, OR ANXIETY, UNSPECIFIED DEMENTIA SEVERITY, UNSPECIFIED DEMENTIA TYPE (HCC): ICD-10-CM

## 2024-10-08 RX ORDER — MEMANTINE HYDROCHLORIDE 10 MG/1
10 TABLET ORAL 2 TIMES DAILY
Qty: 60 TABLET | Refills: 0 | Status: SHIPPED | OUTPATIENT
Start: 2024-10-08

## 2024-10-21 ENCOUNTER — TELEPHONE (OUTPATIENT)
Age: 64
End: 2024-10-21

## 2024-10-21 NOTE — TELEPHONE ENCOUNTER
Sarina//[facility] Caregiver states she needs a call back to discuss Plan of Care for getting [facility] Forms completed. Please call to advise. Thank you

## 2024-10-22 ENCOUNTER — TELEPHONE (OUTPATIENT)
Age: 64
End: 2024-10-22

## 2024-10-22 NOTE — TELEPHONE ENCOUNTER
Called, no answer left message to call office back.      Returning her call re: plan of care forms.

## 2024-11-22 ENCOUNTER — OFFICE VISIT (OUTPATIENT)
Age: 64
End: 2024-11-22
Payer: MEDICARE

## 2024-11-22 VITALS
TEMPERATURE: 97.1 F | RESPIRATION RATE: 16 BRPM | SYSTOLIC BLOOD PRESSURE: 103 MMHG | BODY MASS INDEX: 28.35 KG/M2 | DIASTOLIC BLOOD PRESSURE: 68 MMHG | HEART RATE: 100 BPM | OXYGEN SATURATION: 95 % | HEIGHT: 63 IN | WEIGHT: 160 LBS

## 2024-11-22 DIAGNOSIS — N30.00 ACUTE CYSTITIS WITHOUT HEMATURIA: ICD-10-CM

## 2024-11-22 DIAGNOSIS — D75.89 MACROCYTOSIS: ICD-10-CM

## 2024-11-22 DIAGNOSIS — E78.00 HYPERCHOLESTEROLEMIA: ICD-10-CM

## 2024-11-22 DIAGNOSIS — R39.89 ABNORMAL URINE COLOR: ICD-10-CM

## 2024-11-22 DIAGNOSIS — E03.9 HYPOTHYROIDISM, UNSPECIFIED TYPE: ICD-10-CM

## 2024-11-22 DIAGNOSIS — E78.00 HYPERCHOLESTEROLEMIA: Primary | ICD-10-CM

## 2024-11-22 LAB
BILIRUBIN, URINE, POC: NEGATIVE
BLOOD URINE, POC: NORMAL
GLUCOSE URINE, POC: NEGATIVE
KETONES, URINE, POC: NEGATIVE
LEUKOCYTE ESTERASE, URINE, POC: NEGATIVE
NITRITE, URINE, POC: NORMAL
PH, URINE, POC: 6.5 (ref 4.6–8)
PROTEIN,URINE, POC: NEGATIVE
SPECIFIC GRAVITY, URINE, POC: 1.02 (ref 1–1.03)
URINALYSIS CLARITY, POC: NORMAL
URINALYSIS COLOR, POC: NORMAL
UROBILINOGEN, POC: NORMAL MG/DL

## 2024-11-22 PROCEDURE — 99213 OFFICE O/P EST LOW 20 MIN: CPT | Performed by: STUDENT IN AN ORGANIZED HEALTH CARE EDUCATION/TRAINING PROGRAM

## 2024-11-22 PROCEDURE — 81001 URINALYSIS AUTO W/SCOPE: CPT | Performed by: STUDENT IN AN ORGANIZED HEALTH CARE EDUCATION/TRAINING PROGRAM

## 2024-11-22 RX ORDER — CEFDINIR 300 MG/1
300 CAPSULE ORAL 2 TIMES DAILY
Qty: 10 CAPSULE | Refills: 0 | Status: SHIPPED | OUTPATIENT
Start: 2024-11-22 | End: 2024-11-27

## 2024-11-22 NOTE — PROGRESS NOTES
\"Have you been to the ER, urgent care clinic since your last visit?  Hospitalized since your last visit?\"    NO    “Have you seen or consulted any other health care providers outside our system since your last visit?”    NEURO// dementia// 09/2024     “Have you had a pap smear?”    2023// normal// [facility]    Date of last Cervical Cancer screen (HPV or PAP): 6/1/2021

## 2024-11-22 NOTE — PROGRESS NOTES
HISTORY OF PRESENT ILLNESS   Odilia Hong is a 64 y.o. female     PMH of dementia (evaluated by Dr. 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    Pt presents for possible UTI.     She was not acting like herself. Staff has needed to repeat things to her several times and she has been saying things that are inappropriate. These symptoms are pretty typical of a UTI for her. There has been some darkening of the urine as well. pt not expressing any symptoms. Home UTI test positive.       Not taking ibuprofen except rare occasions for L arm pain.          SOCIAL HISTORY:    /68 (Site: Left Upper Arm, Position: Sitting, Cuff Size: Large Adult)   Pulse 100   Temp 97.1 °F (36.2 °C) (Temporal)   Resp 16   Ht 1.6 m (5' 2.99\")   Wt 72.6 kg (160 lb)   SpO2 95%   BMI 28.35 kg/m²     Physical Exam  Constitutional:       General: She is not in acute distress.     Appearance: She is not toxic-appearing.   HENT:      Head: Normocephalic and atraumatic.      Mouth/Throat:      Mouth: Mucous membranes are moist.   Eyes:      General: No scleral icterus.     Extraocular Movements: Extraocular movements intact.      Conjunctiva/sclera: Conjunctivae normal.      Pupils: Pupils are equal, round, and reactive to light.   Cardiovascular:      Rate and Rhythm: Normal rate and regular rhythm.      Heart sounds: No murmur heard.     No friction rub. No gallop.   Pulmonary:      Effort: Pulmonary effort is normal.      Breath sounds: Normal breath sounds. No wheezing, rhonchi or rales.   Abdominal:      General: Bowel sounds are normal.      Palpations: Abdomen is soft.      Tenderness: There is no abdominal tenderness.   Musculoskeletal:      Cervical back: No tenderness.      Comments: Mild tenderness of the L humerus, w well healed surgical scar.    Lymphadenopathy:

## 2024-11-23 LAB
ALBUMIN SERPL-MCNC: 3.4 G/DL (ref 3.5–5)
ALBUMIN/GLOB SERPL: 1 (ref 1.1–2.2)
ALP SERPL-CCNC: 107 U/L (ref 45–117)
ALT SERPL-CCNC: 15 U/L (ref 12–78)
ANION GAP SERPL CALC-SCNC: 8 MMOL/L (ref 2–12)
AST SERPL-CCNC: 16 U/L (ref 15–37)
BASOPHILS # BLD: 0 K/UL (ref 0–0.1)
BASOPHILS NFR BLD: 0 % (ref 0–1)
BILIRUB SERPL-MCNC: 0.5 MG/DL (ref 0.2–1)
BUN SERPL-MCNC: 9 MG/DL (ref 6–20)
BUN/CREAT SERPL: 15 (ref 12–20)
CALCIUM SERPL-MCNC: 9.5 MG/DL (ref 8.5–10.1)
CHLORIDE SERPL-SCNC: 103 MMOL/L (ref 97–108)
CHOLEST SERPL-MCNC: 205 MG/DL
CO2 SERPL-SCNC: 27 MMOL/L (ref 21–32)
CREAT SERPL-MCNC: 0.61 MG/DL (ref 0.55–1.02)
DIFFERENTIAL METHOD BLD: ABNORMAL
EOSINOPHIL # BLD: 0 K/UL (ref 0–0.4)
EOSINOPHIL NFR BLD: 1 % (ref 0–7)
ERYTHROCYTE [DISTWIDTH] IN BLOOD BY AUTOMATED COUNT: 14.3 % (ref 11.5–14.5)
GLOBULIN SER CALC-MCNC: 3.5 G/DL (ref 2–4)
GLUCOSE SERPL-MCNC: 90 MG/DL (ref 65–100)
HCT VFR BLD AUTO: 38.6 % (ref 35–47)
HDLC SERPL-MCNC: 64 MG/DL
HDLC SERPL: 3.2 (ref 0–5)
HGB BLD-MCNC: 12.7 G/DL (ref 11.5–16)
IMM GRANULOCYTES # BLD AUTO: 0 K/UL (ref 0–0.04)
IMM GRANULOCYTES NFR BLD AUTO: 1 % (ref 0–0.5)
LDLC SERPL CALC-MCNC: 98.8 MG/DL (ref 0–100)
LYMPHOCYTES # BLD: 2.6 K/UL (ref 0.8–3.5)
LYMPHOCYTES NFR BLD: 46 % (ref 12–49)
MCH RBC QN AUTO: 31.2 PG (ref 26–34)
MCHC RBC AUTO-ENTMCNC: 32.9 G/DL (ref 30–36.5)
MCV RBC AUTO: 94.8 FL (ref 80–99)
MONOCYTES # BLD: 0.5 K/UL (ref 0–1)
MONOCYTES NFR BLD: 9 % (ref 5–13)
NEUTS SEG # BLD: 2.3 K/UL (ref 1.8–8)
NEUTS SEG NFR BLD: 43 % (ref 32–75)
NRBC # BLD: 0 K/UL (ref 0–0.01)
NRBC BLD-RTO: 0 PER 100 WBC
PLATELET # BLD AUTO: 266 K/UL (ref 150–400)
PMV BLD AUTO: 11.2 FL (ref 8.9–12.9)
POTASSIUM SERPL-SCNC: 4.8 MMOL/L (ref 3.5–5.1)
PROT SERPL-MCNC: 6.9 G/DL (ref 6.4–8.2)
RBC # BLD AUTO: 4.07 M/UL (ref 3.8–5.2)
SODIUM SERPL-SCNC: 138 MMOL/L (ref 136–145)
T4 FREE SERPL-MCNC: 1 NG/DL (ref 0.8–1.5)
TRIGL SERPL-MCNC: 211 MG/DL
TSH SERPL DL<=0.05 MIU/L-ACNC: 1.48 UIU/ML (ref 0.36–3.74)
VLDLC SERPL CALC-MCNC: 42.2 MG/DL
WBC # BLD AUTO: 5.5 K/UL (ref 3.6–11)

## 2024-11-24 LAB
BACTERIA SPEC CULT: NORMAL
SERVICE CMNT-IMP: NORMAL

## 2024-11-26 NOTE — RESULT ENCOUNTER NOTE
The triglycerides are still a little high but improved from 3 months ago.     Lab work is otherwise stable.

## 2024-12-10 RX ORDER — PANTOPRAZOLE SODIUM 40 MG/1
40 TABLET, DELAYED RELEASE ORAL DAILY
Qty: 63 TABLET | Refills: 0 | Status: SHIPPED | OUTPATIENT
Start: 2024-12-10

## 2024-12-17 ENCOUNTER — OFFICE VISIT (OUTPATIENT)
Age: 64
End: 2024-12-17
Payer: MEDICARE

## 2024-12-17 VITALS
TEMPERATURE: 94.3 F | WEIGHT: 160.4 LBS | SYSTOLIC BLOOD PRESSURE: 102 MMHG | RESPIRATION RATE: 18 BRPM | DIASTOLIC BLOOD PRESSURE: 67 MMHG | OXYGEN SATURATION: 93 % | HEART RATE: 94 BPM | HEIGHT: 63 IN | BODY MASS INDEX: 28.42 KG/M2

## 2024-12-17 DIAGNOSIS — F25.9 SCHIZOPHRENIA, SCHIZO-AFFECTIVE (HCC): ICD-10-CM

## 2024-12-17 DIAGNOSIS — F03.90 DEMENTIA, UNSPECIFIED DEMENTIA SEVERITY, UNSPECIFIED DEMENTIA TYPE, UNSPECIFIED WHETHER BEHAVIORAL, PSYCHOTIC, OR MOOD DISTURBANCE OR ANXIETY (HCC): ICD-10-CM

## 2024-12-17 DIAGNOSIS — Z00.00 MEDICARE ANNUAL WELLNESS VISIT, SUBSEQUENT: Primary | ICD-10-CM

## 2024-12-17 DIAGNOSIS — Z23 NEEDS FLU SHOT: ICD-10-CM

## 2024-12-17 DIAGNOSIS — R73.02 IGT (IMPAIRED GLUCOSE TOLERANCE): ICD-10-CM

## 2024-12-17 DIAGNOSIS — R32 URINARY INCONTINENCE, UNSPECIFIED TYPE: ICD-10-CM

## 2024-12-17 DIAGNOSIS — E03.9 HYPOTHYROIDISM, UNSPECIFIED TYPE: ICD-10-CM

## 2024-12-17 DIAGNOSIS — H61.22 IMPACTED CERUMEN OF LEFT EAR: ICD-10-CM

## 2024-12-17 PROCEDURE — PBSHW INFLUENZA, FLUCELVAX TRIVALENT, (AGE 6 MO+) IM, PRESERVATIVE FREE, 0.5ML: Performed by: STUDENT IN AN ORGANIZED HEALTH CARE EDUCATION/TRAINING PROGRAM

## 2024-12-17 PROCEDURE — G0008 ADMIN INFLUENZA VIRUS VAC: HCPCS | Performed by: STUDENT IN AN ORGANIZED HEALTH CARE EDUCATION/TRAINING PROGRAM

## 2024-12-17 PROCEDURE — G0439 PPPS, SUBSEQ VISIT: HCPCS | Performed by: STUDENT IN AN ORGANIZED HEALTH CARE EDUCATION/TRAINING PROGRAM

## 2024-12-17 RX ORDER — MELOXICAM 15 MG/1
TABLET ORAL
COMMUNITY

## 2024-12-17 RX ORDER — CEPHALEXIN 500 MG/1
500 CAPSULE ORAL
COMMUNITY
Start: 2024-05-16

## 2024-12-17 RX ORDER — LORAZEPAM 2 MG/1
TABLET ORAL
COMMUNITY

## 2024-12-17 RX ORDER — FENOFIBRATE 160 MG/1
1 TABLET ORAL DAILY
COMMUNITY

## 2024-12-17 ASSESSMENT — PATIENT HEALTH QUESTIONNAIRE - PHQ9
2. FEELING DOWN, DEPRESSED OR HOPELESS: NOT AT ALL
SUM OF ALL RESPONSES TO PHQ QUESTIONS 1-9: 0
1. LITTLE INTEREST OR PLEASURE IN DOING THINGS: NOT AT ALL
SUM OF ALL RESPONSES TO PHQ QUESTIONS 1-9: 0
SUM OF ALL RESPONSES TO PHQ QUESTIONS 1-9: 0
SUM OF ALL RESPONSES TO PHQ9 QUESTIONS 1 & 2: 0
SUM OF ALL RESPONSES TO PHQ QUESTIONS 1-9: 0

## 2024-12-17 NOTE — PROGRESS NOTES
HISTORY OF PRESENT ILLNESS   Odilia Hong is a 64 y.o. female     PMH of developmental delay, dementia (evaluated by Dr. 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    Pt presents for mawv.     No changes in mood or mental status. Seen by psychiatry yesterday.     Memory worsening. Had to reschedule last appt neurology.     Fall in 4/2024, she was groggy, feet slipped and she fell backwards. Now she gets in bed after taking her nighttime med and wearing appropriate footwear.   Not taking ibuprofen except rare occasions for L arm pain.      All ADL require some level of help.     No longer smoking.       SOCIAL HISTORY:    /67 (Site: Right Upper Arm, Position: Sitting, Cuff Size: Medium Adult)   Pulse 94   Temp (!) 94.3 °F (34.6 °C) (Temporal)   Resp 18   Ht 1.6 m (5' 2.99\")   Wt 72.8 kg (160 lb 6.4 oz)   SpO2 93%   BMI 28.42 kg/m²     Physical Exam  Constitutional:       General: She is not in acute distress.     Appearance: She is not toxic-appearing.   HENT:      Head: Normocephalic and atraumatic.      Right Ear: Tympanic membrane, ear canal and external ear normal.      Left Ear: There is impacted cerumen.      Mouth/Throat:      Mouth: Mucous membranes are moist.   Eyes:      General: No scleral icterus.     Extraocular Movements: Extraocular movements intact.      Conjunctiva/sclera: Conjunctivae normal.      Pupils: Pupils are equal, round, and reactive to light.   Cardiovascular:      Rate and Rhythm: Normal rate and regular rhythm.      Heart sounds: No murmur heard.     No friction rub. No gallop.   Pulmonary:      Effort: Pulmonary effort is normal.      Breath sounds: Normal breath sounds. No wheezing, rhonchi or rales.   Abdominal:      General: Bowel sounds are normal.      Palpations: Abdomen is soft.      Tenderness: There is no abdominal

## 2024-12-17 NOTE — PROGRESS NOTES
\"Have you been to the ER, urgent care clinic since your last visit?  Hospitalized since your last visit?\"    NO    “Have you seen or consulted any other health care providers outside our system since your last visit?”    NO     “Have you had a pap smear?”    NO    Date of last Cervical Cancer screen (HPV or PAP): 6/1/2021

## 2024-12-30 NOTE — DISCHARGE INSTRUCTIONS
Patient Education        Black Eye: Care Instructions  Your Care Instructions     A black eye is bruising and swelling around the eye or the eyelids. The swelling from your black eye may get worse over the next couple of days. After that, the swelling should steadily improve until it is gone. The bruise around your eye will change colors as it heals. The skin may turn from black and blue to green, yellow, and brown before it returns to its normal color. It may take 1 to 3 weeks to return to normal.  Follow-up care is a key part of your treatment and safety. Be sure to make and go to all appointments, and call your doctor if you are having problems. It's also a good idea to know your test results and keep a list of the medicines you take. How can you care for yourself at home? · Put ice or a cold pack on the area for 10 to 20 minutes at a time. Put a thin cloth between the ice pack and your skin. · If your doctor prescribed antibiotics, take them as directed. Do not stop taking them just because you feel better. You need to take the full course of antibiotics. · Take pain medicines exactly as directed. ? If the doctor gave you a prescription medicine for pain, take it as prescribed. ? If you are not taking a prescription pain medicine, ask your doctor if you can take an over-the-counter medicine. When should you call for help? Call your doctor now or seek immediate medical care if:    · You have any new changes in vision, such as double vision or blurring.     · You have new or increased pain in or around your eye. Watch closely for changes in your health, and be sure to contact your doctor if:    · You do not get better as expected. Where can you learn more? Go to http://www.gray.com/  Enter J681 in the search box to learn more about \"Black Eye: Care Instructions. \"  Current as of: June 26, 2019               Content Version: 12.6  © 9156-1524 [employer].    Care instructions adapted under license by [employer] (which disclaims liability or warranty for this information). If you have questions about a medical condition or this instruction, always ask your healthcare professional. [address] any warranty or liability for your use of this information. Patient Education        Bruises: Care Instructions  Your Care Instructions     Bruises occur when small blood vessels under the skin tear or rupture, most often from a twist, bump, or fall. Blood leaks into tissues under the skin and causes a black-and-blue spot that often turns colors, including purplish black, reddish blue, or yellowish green, as the bruise heals. Bruises hurt, but most are not serious and will go away on their own within 2 to 4 weeks. Sometimes, gravity causes them to spread down the body. A leg bruise usually will take longer to heal than a bruise on the face or arms. Follow-up care is a key part of your treatment and safety. Be sure to make and go to all appointments, and call your doctor if you are having problems. It's also a good idea to know your test results and keep a list of the medicines you take. How can you care for yourself at home? · Take pain medicines exactly as directed. ? If the doctor gave you a prescription medicine for pain, take it as prescribed. ? If you are not taking a prescription pain medicine, ask your doctor if you can take an over-the-counter medicine. · Put ice or a cold pack on the area for 10 to 20 minutes at a time. Put a thin cloth between the ice and your skin. · If you can, prop up the bruised area on pillows as much as possible for the next few days. Try to keep the bruise above the level of your heart. When should you call for help? Call your doctor now or seek immediate medical care if:    · You have signs of infection, such as:  ? Increased pain, swelling, warmth, or redness.   ? Red streaks leading from the bruise. ? Pus draining from the bruise. ? A fever.     · You have a bruise on your leg and signs of a blood clot, such as:  ? Increasing redness and swelling along with warmth, tenderness, and pain in the bruised area. ? Pain in your calf, back of the knee, thigh, or groin. ? Redness and swelling in your leg or groin.     · Your pain gets worse. Watch closely for changes in your health, and be sure to contact your doctor if:    · You do not get better as expected. Where can you learn more? Go to http://www.gray.com/  Enter T177 in the search box to learn more about \"Bruises: Care Instructions. \"  Current as of: June 26, 2019               Content Version: 12.6  © 9896-3132 [employer]. Care instructions adapted under license by [employer] (which disclaims liability or warranty for this information). If you have questions about a medical condition or this instruction, always ask your healthcare professional. [place] [address] any warranty or liability for your use of this information. Patient Education        Learning About a Closed Head Injury  What is a closed head injury? A closed head injury happens when your head gets hit hard. The strong force of the blow causes your brain to shake in your skull. This movement can cause the brain to bruise, swell, or tear. Sometimes nerves or blood vessels also get damaged. This can cause bleeding in or around the brain. A concussion is a type of closed head injury. What are the symptoms? If you have a mild concussion, you may have a mild headache or feel \"not quite right. \" These symptoms are common. They usually go away over a few days to 4 weeks. But sometimes after a concussion, you feel like you can't function as well as before the injury. And you have new symptoms. This is called postconcussive syndrome.  You may:  · Find it harder to solve problems, think, concentrate, or remember. · Have headaches. · Have changes in your sleep patterns, such as not being able to sleep or sleeping all the time. · Have changes in your personality. · Not be interested in your usual activities. · Feel angry or anxious without a clear reason. · Lose your sense of taste or smell. · Be dizzy, lightheaded, or unsteady. It may be hard to stand or walk. How is a closed head injury treated? Any person who may have a concussion needs to see a doctor. Some people have to stay in the hospital to be watched. Others can go home safely. If you go home, follow your doctor's instructions. He or she will tell you if you need someone to watch you closely for the next 24 hours or longer. Rest is the best treatment. Get plenty of sleep at night. And try to rest during the day. · Avoid activities that are physically or mentally demanding. These include housework, exercise, and schoolwork. And don't play video games, send text messages, or use the computer. You may need to change your school or work schedule to be able to avoid these activities. · Ask your doctor when it's okay to drive, ride a bike, or operate machinery. · Take an over-the-counter pain medicine, such as acetaminophen (Tylenol), ibuprofen (Advil, Motrin), or naproxen (Aleve). Be safe with medicines. Read and follow all instructions on the label. · Check with your doctor before you use any other medicines for pain. · Do not drink alcohol or use illegal drugs. They can slow recovery. They can also increase your risk of getting a second head injury. Follow-up care is a key part of your treatment and safety. Be sure to make and go to all appointments, and call your doctor if you are having problems. It's also a good idea to know your test results and keep a list of the medicines you take. Where can you learn more?   Go to http://www.gray.com/  Enter E235 in the search box to learn more about \"Learning About a Closed Head Injury. \"  Current as of: November 20, 2019               Content Version: 12.6  © 1157-0474 [employer]. Care instructions adapted under license by [employer] (which disclaims liability or warranty for this information). If you have questions about a medical condition or this instruction, always ask your healthcare professional. [place] [address] any warranty or liability for your use of this information. Private car

## 2025-01-17 RX ORDER — PANTOPRAZOLE SODIUM 40 MG/1
40 TABLET, DELAYED RELEASE ORAL DAILY
Qty: 90 TABLET | Refills: 1 | Status: SHIPPED | OUTPATIENT
Start: 2025-01-17

## 2025-01-17 RX ORDER — VIT B COMP NO.3/FOLIC/C/BIOTIN 1 MG-60 MG
1 TABLET ORAL DAILY
Qty: 90 TABLET | Refills: 3 | Status: SHIPPED | OUTPATIENT
Start: 2025-01-17

## 2025-01-17 RX ORDER — PRAVASTATIN SODIUM 80 MG/1
TABLET ORAL
Qty: 90 TABLET | Refills: 1 | Status: SHIPPED | OUTPATIENT
Start: 2025-01-17

## 2025-01-17 RX ORDER — MIRABEGRON 50 MG/1
50 TABLET, FILM COATED, EXTENDED RELEASE ORAL DAILY
Qty: 90 TABLET | Refills: 1 | Status: SHIPPED | OUTPATIENT
Start: 2025-01-17

## 2025-01-28 RX ORDER — MIRABEGRON 50 MG/1
50 TABLET, FILM COATED, EXTENDED RELEASE ORAL DAILY
Qty: 31 TABLET | Refills: 0 | OUTPATIENT
Start: 2025-01-28

## 2025-01-28 RX ORDER — PANTOPRAZOLE SODIUM 40 MG/1
40 TABLET, DELAYED RELEASE ORAL DAILY
Qty: 31 TABLET | Refills: 0 | OUTPATIENT
Start: 2025-01-28

## 2025-01-28 RX ORDER — VIT B COMP NO.3/FOLIC/C/BIOTIN 1 MG-60 MG
1 TABLET ORAL DAILY
Qty: 31 TABLET | Refills: 0 | OUTPATIENT
Start: 2025-01-28

## 2025-01-28 RX ORDER — PRAVASTATIN SODIUM 80 MG/1
TABLET ORAL
Qty: 31 TABLET | Refills: 0 | OUTPATIENT
Start: 2025-01-28

## 2025-02-17 ENCOUNTER — HOSPITAL ENCOUNTER (EMERGENCY)
Facility: HOSPITAL | Age: 65
Discharge: HOME | End: 2025-02-17
Attending: STUDENT IN AN ORGANIZED HEALTH CARE EDUCATION/TRAINING PROGRAM
Payer: MEDICARE

## 2025-02-17 ENCOUNTER — APPOINTMENT (OUTPATIENT)
Facility: HOSPITAL | Age: 65
End: 2025-02-17
Payer: MEDICARE

## 2025-02-17 VITALS
TEMPERATURE: 100.3 F | DIASTOLIC BLOOD PRESSURE: 77 MMHG | SYSTOLIC BLOOD PRESSURE: 130 MMHG | RESPIRATION RATE: 21 BRPM | OXYGEN SATURATION: 93 % | HEART RATE: 109 BPM | WEIGHT: 160.5 LBS | BODY MASS INDEX: 28.44 KG/M2

## 2025-02-17 DIAGNOSIS — U07.1 COVID-19: Primary | ICD-10-CM

## 2025-02-17 LAB
ALBUMIN SERPL-MCNC: 3.5 G/DL (ref 3.5–5)
ALBUMIN/GLOB SERPL: 0.9 (ref 1.1–2.2)
ALP SERPL-CCNC: 127 U/L (ref 45–117)
ALT SERPL-CCNC: 27 U/L (ref 12–78)
ANION GAP SERPL CALC-SCNC: 7 MMOL/L (ref 2–12)
AST SERPL-CCNC: 30 U/L (ref 15–37)
BASOPHILS # BLD: 0.02 K/UL (ref 0–0.1)
BASOPHILS NFR BLD: 0.3 % (ref 0–1)
BILIRUB SERPL-MCNC: 0.4 MG/DL (ref 0.2–1)
BUN SERPL-MCNC: 10 MG/DL (ref 6–20)
BUN/CREAT SERPL: 14 (ref 12–20)
CALCIUM SERPL-MCNC: 9.6 MG/DL (ref 8.5–10.1)
CHLORIDE SERPL-SCNC: 98 MMOL/L (ref 97–108)
CO2 SERPL-SCNC: 26 MMOL/L (ref 21–32)
COMMENT:: NORMAL
CREAT SERPL-MCNC: 0.73 MG/DL (ref 0.55–1.02)
DIFFERENTIAL METHOD BLD: ABNORMAL
EOSINOPHIL # BLD: 0 K/UL (ref 0–0.4)
EOSINOPHIL NFR BLD: 0 % (ref 0–7)
ERYTHROCYTE [DISTWIDTH] IN BLOOD BY AUTOMATED COUNT: 14.3 % (ref 11.5–14.5)
FLUAV RNA SPEC QL NAA+PROBE: NOT DETECTED
FLUBV RNA SPEC QL NAA+PROBE: NOT DETECTED
GLOBULIN SER CALC-MCNC: 4.1 G/DL (ref 2–4)
GLUCOSE SERPL-MCNC: 87 MG/DL (ref 65–100)
HCT VFR BLD AUTO: 41.3 % (ref 35–47)
HGB BLD-MCNC: 13.7 G/DL (ref 11.5–16)
IMM GRANULOCYTES # BLD AUTO: 0.07 K/UL (ref 0–0.04)
IMM GRANULOCYTES NFR BLD AUTO: 1.1 % (ref 0–0.5)
LYMPHOCYTES # BLD: 1.04 K/UL (ref 0.8–3.5)
LYMPHOCYTES NFR BLD: 16.6 % (ref 12–49)
MCH RBC QN AUTO: 30.9 PG (ref 26–34)
MCHC RBC AUTO-ENTMCNC: 33.2 G/DL (ref 30–36.5)
MCV RBC AUTO: 93.2 FL (ref 80–99)
MONOCYTES # BLD: 1 K/UL (ref 0–1)
MONOCYTES NFR BLD: 15.9 % (ref 5–13)
NEUTS SEG # BLD: 4.14 K/UL (ref 1.8–8)
NEUTS SEG NFR BLD: 66.1 % (ref 32–75)
NRBC # BLD: 0 K/UL (ref 0–0.01)
NRBC BLD-RTO: 0 PER 100 WBC
PLATELET # BLD AUTO: 248 K/UL (ref 150–400)
PMV BLD AUTO: 10.2 FL (ref 8.9–12.9)
POTASSIUM SERPL-SCNC: 4.6 MMOL/L (ref 3.5–5.1)
PROT SERPL-MCNC: 7.6 G/DL (ref 6.4–8.2)
RBC # BLD AUTO: 4.43 M/UL (ref 3.8–5.2)
SARS-COV-2 RNA RESP QL NAA+PROBE: DETECTED
SODIUM SERPL-SCNC: 131 MMOL/L (ref 136–145)
SOURCE: ABNORMAL
SPECIMEN HOLD: NORMAL
WBC # BLD AUTO: 6.3 K/UL (ref 3.6–11)

## 2025-02-17 PROCEDURE — 71045 X-RAY EXAM CHEST 1 VIEW: CPT

## 2025-02-17 PROCEDURE — 80053 COMPREHEN METABOLIC PANEL: CPT

## 2025-02-17 PROCEDURE — 6370000000 HC RX 637 (ALT 250 FOR IP): Performed by: STUDENT IN AN ORGANIZED HEALTH CARE EDUCATION/TRAINING PROGRAM

## 2025-02-17 PROCEDURE — 87636 SARSCOV2 & INF A&B AMP PRB: CPT

## 2025-02-17 PROCEDURE — 99285 EMERGENCY DEPT VISIT HI MDM: CPT

## 2025-02-17 PROCEDURE — 36415 COLL VENOUS BLD VENIPUNCTURE: CPT

## 2025-02-17 PROCEDURE — 85025 COMPLETE CBC W/AUTO DIFF WBC: CPT

## 2025-02-17 RX ORDER — ACETAMINOPHEN 500 MG
1000 TABLET ORAL 3 TIMES DAILY PRN
Qty: 100 TABLET | Refills: 0 | Status: SHIPPED | OUTPATIENT
Start: 2025-02-17

## 2025-02-17 RX ORDER — IBUPROFEN 600 MG/1
600 TABLET, FILM COATED ORAL EVERY 6 HOURS PRN
Qty: 50 TABLET | Refills: 1 | Status: SHIPPED | OUTPATIENT
Start: 2025-02-17

## 2025-02-17 RX ORDER — ACETAMINOPHEN 500 MG
1000 TABLET ORAL
Status: COMPLETED | OUTPATIENT
Start: 2025-02-17 | End: 2025-02-17

## 2025-02-17 RX ADMIN — ACETAMINOPHEN 1000 MG: 500 TABLET, FILM COATED ORAL at 16:57

## 2025-02-17 ASSESSMENT — PAIN SCALES - GENERAL
PAINLEVEL_OUTOF10: 0
PAINLEVEL_OUTOF10: 0

## 2025-02-17 ASSESSMENT — LIFESTYLE VARIABLES
HOW OFTEN DO YOU HAVE A DRINK CONTAINING ALCOHOL: NEVER
HOW MANY STANDARD DRINKS CONTAINING ALCOHOL DO YOU HAVE ON A TYPICAL DAY: PATIENT DOES NOT DRINK

## 2025-02-17 NOTE — ED PROVIDER NOTES
[facility] EMERGENCY DEPARTMENT  EMERGENCY DEPARTMENT ENCOUNTER       Pt Name: Odilia Hong  MRN: [id number]  Birthdate 1960  Date of evaluation: 2/17/2025  Provider: Raghav Berger MD   PCP: Miguel Zarate MD  Note Started: 6:35 PM EST 2/17/25     CHIEF COMPLAINT       Chief Complaint   Patient presents with    Extremity Weakness     [facility] w[facility] CC of unsteady gait, L arm weakness, abnormal speech and dizziness LKW 1300 today. A&Ox4. Pt denies prior stroke. Per EMS,         HISTORY OF PRESENT ILLNESS: 1 or more elements      History From: Patient, EMS, and Nursing Home/SNF/Rehab Center  HPI Limitations: Other (intellectual disability)     Odilia Hong is a 64 y.o. female who presents via by EMS from her adult  for reports of generalized weakness, cough, headaches.  At the facility multiple other residents are also sick with a flulike illness and if you have tested positive for COVID-19.  Patient has a history of an elective disability, history of the patient is somewhat limited.  Patient reports generalized weakness.  Denies nausea, vomiting, chest pain, shortness of breath.  She reports a sore throat, cough and a headache.  No falls or injuries.  Eating and drinking well.  No medications taken prior to arrival.  Patient is at baseline and acting normally.       Nursing Notes were all reviewed and agreed with or any disagreements were addressed in the HPI.     REVIEW OF SYSTEMS      Review of Systems     Positives and Pertinent negatives as per HPI.    PAST HISTORY     Past Medical History:  Past Medical History:   Diagnosis Date    Allergic rhinitis     Arm fracture, left 04/2024    Brain atrophy (HCC) 02/2018    Colon polyps     COVID-19 01/21/2021    GERD (gastroesophageal reflux disease)     H/O colonoscopy     History of Papanicolaou smear of cervix 2014    Hypercholesterolemia     hypercholesterolemia    Hypothyroidism     IGT (impaired

## 2025-02-22 LAB
EKG ATRIAL RATE: 112 BPM
EKG DIAGNOSIS: NORMAL
EKG P AXIS: 58 DEGREES
EKG P-R INTERVAL: 132 MS
EKG Q-T INTERVAL: 320 MS
EKG QRS DURATION: 76 MS
EKG QTC CALCULATION (BAZETT): 436 MS
EKG R AXIS: 47 DEGREES
EKG T AXIS: 57 DEGREES
EKG VENTRICULAR RATE: 112 BPM

## 2025-04-16 ENCOUNTER — OFFICE VISIT (OUTPATIENT)
Age: 65
End: 2025-04-16
Payer: MEDICARE

## 2025-04-16 VITALS
OXYGEN SATURATION: 100 % | SYSTOLIC BLOOD PRESSURE: 122 MMHG | DIASTOLIC BLOOD PRESSURE: 74 MMHG | HEART RATE: 96 BPM | RESPIRATION RATE: 18 BRPM

## 2025-04-16 DIAGNOSIS — F03.90 DEMENTIA WITHOUT BEHAVIORAL DISTURBANCE, PSYCHOTIC DISTURBANCE, MOOD DISTURBANCE, OR ANXIETY, UNSPECIFIED DEMENTIA SEVERITY, UNSPECIFIED DEMENTIA TYPE (HCC): Primary | ICD-10-CM

## 2025-04-16 DIAGNOSIS — F51.01 PRIMARY INSOMNIA: ICD-10-CM

## 2025-04-16 DIAGNOSIS — M79.602 PAIN IN BOTH UPPER EXTREMITIES: ICD-10-CM

## 2025-04-16 DIAGNOSIS — M79.601 PAIN IN BOTH UPPER EXTREMITIES: ICD-10-CM

## 2025-04-16 DIAGNOSIS — R26.9 ABNORMALITY OF GAIT: ICD-10-CM

## 2025-04-16 PROCEDURE — 99215 OFFICE O/P EST HI 40 MIN: CPT | Performed by: NURSE PRACTITIONER

## 2025-04-16 RX ORDER — PHENOL 1.4 %
AEROSOL, SPRAY (ML) MUCOUS MEMBRANE
Qty: 90 TABLET | Refills: 3 | Status: SHIPPED | OUTPATIENT
Start: 2025-04-16

## 2025-04-16 RX ORDER — MEMANTINE HYDROCHLORIDE 10 MG/1
10 TABLET ORAL 2 TIMES DAILY
Qty: 60 TABLET | Refills: 11 | Status: SHIPPED | OUTPATIENT
Start: 2025-04-16

## 2025-04-16 NOTE — PROGRESS NOTES
drift. No muscle wasting or fasiculations noted.       MRI Result (most recent):  MRI BRAIN W WO CONTRAST 03/02/2018    Narrative  Clinical indication: Behavioral change, confusion, a decline abnormal gait.    Technical factors: Diffusion imaging, sagittal T1-weighted, axial T1-weighted  pre and post 13 cc IV ProHance T2-weighted FLAIR gradient echo coronal  T2-weighted coronal T1-weighted postcontrast sagittal T1-weighted postcontrast.  Comparison to thousand 8.    Atrophy is prominent for age. There is no extra-axial fluid collection  hemorrhage or shift. Ventricles are midline. Major flow voids in the vessels at  the base of the brain are present. There is no significant white matter disease.  Is no enhancing lesion or masses her.    Impression  impression: Prominent atrophy.      CT Result (most recent):  CT ABDOMEN PELVIS W IV CONTRAST 09/20/2024    Narrative  EXAM: CT abdomen pelvis with contrast    INDICATION: Left lower quadrant pain    COMPARISON: Ultrasound 5/25/2018.    TECHNIQUE: Helical CT of the abdomen  and pelvis  following the uneventful  intravenous administration of nonionic contrast.  Coronal and sagittal reformats  are performed. CT dose reduction was achieved through use of a standardized  protocol tailored for this examination and automatic exposure control for dose  modulation.    FINDINGS:  The visualized lung bases demonstrate no mass or consolidation. The heart size  is normal. There is no pericardial or pleural effusion.    The liver, spleen, pancreas, and adrenal glands are normal. The gall bladder is  present  without intra- or extra-hepatic biliary dilatation.    The kidneys are symmetric without hydronephrosis. The left kidney cyst measures  11 mm for which no imaging follow-up is recommended.    There are no dilated bowel loops.  The appendix is normal.    There are no enlarged lymph nodes.  There is no free fluid or free air.    The urinary bladder is nondistended with

## 2025-05-13 ENCOUNTER — TRANSCRIBE ORDERS (OUTPATIENT)
Facility: HOSPITAL | Age: 65
End: 2025-05-13

## 2025-05-13 DIAGNOSIS — Z12.31 ENCOUNTER FOR SCREENING MAMMOGRAM FOR MALIGNANT NEOPLASM OF BREAST: Primary | ICD-10-CM

## 2025-06-04 ENCOUNTER — TELEPHONE (OUTPATIENT)
Age: 65
End: 2025-06-04

## 2025-06-04 NOTE — TELEPHONE ENCOUNTER
S/w Sarina her care giver on HIPAA  Received two pt identifiers   Pt started getting a rash behind both legs three days after taking amoxicillan for a tooth procedure. Please advise.

## 2025-06-04 NOTE — TELEPHONE ENCOUNTER
Pt has red spots on back of legs and on arms.  This is after starting the amoxicillin.   500 mg     She had a tooth extraction.  Please call to advise.     Could not do visit today.  Wants a call.

## 2025-06-04 NOTE — TELEPHONE ENCOUNTER
Caregiver Sami states she has stopped the antibiotic. Prescribed on 05/27 and took for several days before stopping and rash occurred.

## 2025-06-05 DIAGNOSIS — E03.9 HYPOTHYROIDISM, UNSPECIFIED TYPE: ICD-10-CM

## 2025-06-05 RX ORDER — LEVOTHYROXINE SODIUM 100 UG/1
100 TABLET ORAL DAILY
Qty: 31 TABLET | Refills: 0 | Status: SHIPPED | OUTPATIENT
Start: 2025-06-05

## 2025-07-24 ENCOUNTER — TELEPHONE (OUTPATIENT)
Age: 65
End: 2025-07-24

## 2025-07-24 NOTE — TELEPHONE ENCOUNTER
Pt daughter called to schedule an appt for   Pt. Being confused and uti symptoms I let pt know provider did not have any openings today and recommended she go to an UC or ER due to the pt being confused     States understanding

## 2025-09-05 DIAGNOSIS — E03.9 HYPOTHYROIDISM, UNSPECIFIED TYPE: ICD-10-CM

## 2025-09-05 RX ORDER — LEVOTHYROXINE SODIUM 100 UG/1
100 TABLET ORAL DAILY
Qty: 90 TABLET | Refills: 1 | Status: SHIPPED | OUTPATIENT
Start: 2025-09-05

## (undated) DEVICE — CATH IV AUTOGRD BC PNK 20GA 25 -- INSYTE

## (undated) DEVICE — Z DISCONTINUED PER MEDLINE LINE GAS SAMPLING O2/CO2 LNG AD 13 FT NSL W/ TBNG FILTERLINE

## (undated) DEVICE — BASIN EMSIS 16OZ GRAPHITE PLAS KID SHP MOLD GRAD FOR ORAL

## (undated) DEVICE — SNARE POLYP SM AD W13MMXL240CM SHTH DIA2.4MM HEX STIFF

## (undated) DEVICE — TRAP,MUCUS SPECIMEN, 80CC: Brand: MEDLINE

## (undated) DEVICE — Device

## (undated) DEVICE — 1200 GUARD II KIT W/5MM TUBE W/O VAC TUBE: Brand: GUARDIAN

## (undated) DEVICE — NON-REM POLYHESIVE PATIENT RETURN ELECTRODE: Brand: VALLEYLAB

## (undated) DEVICE — NEONATAL-ADULT SPO2 SENSOR: Brand: NELLCOR

## (undated) DEVICE — ELECTRODE,RADIOTRANSLUCENT,FOAM,5PK: Brand: MEDLINE

## (undated) DEVICE — SYRINGE MED 3ML CLR PLAS STD N CTRL LUERLOCK TIP DISP

## (undated) DEVICE — SYRINGE MED 10ML LUERLOCK TIP W/O SFTY DISP

## (undated) DEVICE — TRAP SURG QUAD PARABOLA SLOT DSGN SFTY SCRN TRAPEASE

## (undated) DEVICE — TOWEL 4 PLY TISS 19X30 SUE WHT

## (undated) DEVICE — CONTAINER SPEC 20 ML LID NEUT BUFF FORMALIN 10 % POLYPR STS

## (undated) DEVICE — SOLIDIFIER FLD 2OZ 1500CC N DISINF IN BTL DISP SAFESORB

## (undated) DEVICE — STRAINER URIN CALC RNL MSH -- CONVERT TO ITEM 357634

## (undated) DEVICE — FORCEPS BX L240CM JAW DIA2.8MM L CAP W/ NDL MIC MESH TOOTH

## (undated) DEVICE — SNARE ENDOSCP M L240CM W27MM SHTH DIA2.4MM CHN 2.8MM OVL

## (undated) DEVICE — HYPODERMIC SAFETY NEEDLE: Brand: MAGELLAN

## (undated) DEVICE — SET ADMIN 16ML TBNG L100IN 2 Y INJ SITE IV PIGGY BK DISP (ORDER IN MULIPLES OF 48)